# Patient Record
Sex: FEMALE | Race: BLACK OR AFRICAN AMERICAN | Employment: UNEMPLOYED | URBAN - METROPOLITAN AREA
[De-identification: names, ages, dates, MRNs, and addresses within clinical notes are randomized per-mention and may not be internally consistent; named-entity substitution may affect disease eponyms.]

---

## 2018-06-26 ENCOUNTER — HOSPITAL ENCOUNTER (OUTPATIENT)
Dept: NON INVASIVE DIAGNOSTICS | Age: 63
Discharge: HOME | End: 2018-06-26
Payer: COMMERCIAL

## 2018-06-26 LAB
LV EF: 50 %
LVEF MODALITY: NORMAL

## 2018-06-26 PROCEDURE — 93306 TTE W/DOPPLER COMPLETE: CPT

## 2019-07-08 ENCOUNTER — HOSPITAL ENCOUNTER (OUTPATIENT)
Age: 64
Discharge: HOME | End: 2019-07-10
Payer: COMMERCIAL

## 2019-07-08 ENCOUNTER — OFFICE VISIT (OUTPATIENT)
Dept: FAMILY MEDICINE CLINIC | Age: 64
End: 2019-07-08
Payer: COMMERCIAL

## 2019-07-08 VITALS
BODY MASS INDEX: 24.16 KG/M2 | OXYGEN SATURATION: 99 % | HEART RATE: 84 BPM | DIASTOLIC BLOOD PRESSURE: 70 MMHG | SYSTOLIC BLOOD PRESSURE: 162 MMHG | RESPIRATION RATE: 18 BRPM | HEIGHT: 65 IN | TEMPERATURE: 98.4 F | WEIGHT: 145 LBS

## 2019-07-08 DIAGNOSIS — I10 ESSENTIAL HYPERTENSION: Primary | ICD-10-CM

## 2019-07-08 DIAGNOSIS — E78.00 PURE HYPERCHOLESTEROLEMIA: ICD-10-CM

## 2019-07-08 DIAGNOSIS — Z23 NEED FOR PNEUMOCOCCAL VACCINATION: ICD-10-CM

## 2019-07-08 DIAGNOSIS — Z12.39 BREAST CANCER SCREENING: ICD-10-CM

## 2019-07-08 DIAGNOSIS — R01.1 HEART MURMUR: ICD-10-CM

## 2019-07-08 DIAGNOSIS — I10 ESSENTIAL HYPERTENSION: ICD-10-CM

## 2019-07-08 DIAGNOSIS — J45.20 MILD INTERMITTENT ASTHMA WITHOUT COMPLICATION: ICD-10-CM

## 2019-07-08 DIAGNOSIS — M1A.00X0 IDIOPATHIC CHRONIC GOUT WITHOUT TOPHUS, UNSPECIFIED SITE: ICD-10-CM

## 2019-07-08 DIAGNOSIS — Z72.0 TOBACCO ABUSE: ICD-10-CM

## 2019-07-08 PROCEDURE — 85025 COMPLETE CBC W/AUTO DIFF WBC: CPT

## 2019-07-08 PROCEDURE — G8420 CALC BMI NORM PARAMETERS: HCPCS | Performed by: PHYSICIAN ASSISTANT

## 2019-07-08 PROCEDURE — 80061 LIPID PANEL: CPT

## 2019-07-08 PROCEDURE — 90732 PPSV23 VACC 2 YRS+ SUBQ/IM: CPT | Performed by: PHYSICIAN ASSISTANT

## 2019-07-08 PROCEDURE — 4004F PT TOBACCO SCREEN RCVD TLK: CPT | Performed by: PHYSICIAN ASSISTANT

## 2019-07-08 PROCEDURE — 99204 OFFICE O/P NEW MOD 45 MIN: CPT | Performed by: PHYSICIAN ASSISTANT

## 2019-07-08 PROCEDURE — 84550 ASSAY OF BLOOD/URIC ACID: CPT

## 2019-07-08 PROCEDURE — 90471 IMMUNIZATION ADMIN: CPT | Performed by: PHYSICIAN ASSISTANT

## 2019-07-08 PROCEDURE — G8427 DOCREV CUR MEDS BY ELIG CLIN: HCPCS | Performed by: PHYSICIAN ASSISTANT

## 2019-07-08 PROCEDURE — 80053 COMPREHEN METABOLIC PANEL: CPT

## 2019-07-08 PROCEDURE — 3017F COLORECTAL CA SCREEN DOC REV: CPT | Performed by: PHYSICIAN ASSISTANT

## 2019-07-08 RX ORDER — LORATADINE 10 MG/1
10 TABLET ORAL DAILY
Qty: 30 TABLET | Refills: 5 | Status: SHIPPED
Start: 2019-07-08 | End: 2021-07-13

## 2019-07-08 RX ORDER — ATENOLOL 50 MG/1
TABLET ORAL
Qty: 30 TABLET | Refills: 2 | Status: SHIPPED | OUTPATIENT
Start: 2019-07-08 | End: 2019-09-12 | Stop reason: SDUPTHER

## 2019-07-08 RX ORDER — LISINOPRIL AND HYDROCHLOROTHIAZIDE 25; 20 MG/1; MG/1
1 TABLET ORAL DAILY
Qty: 30 TABLET | Refills: 5 | Status: SHIPPED | OUTPATIENT
Start: 2019-07-08 | End: 2020-01-03

## 2019-07-08 RX ORDER — MONTELUKAST SODIUM 10 MG/1
10 TABLET ORAL NIGHTLY
Qty: 30 TABLET | Refills: 5 | Status: SHIPPED | OUTPATIENT
Start: 2019-07-08 | End: 2020-01-03

## 2019-07-08 RX ORDER — MONTELUKAST SODIUM 10 MG/1
TABLET ORAL
Refills: 0 | COMMUNITY
Start: 2019-07-02 | End: 2019-07-08 | Stop reason: SDUPTHER

## 2019-07-08 RX ORDER — ALBUTEROL SULFATE 90 UG/1
AEROSOL, METERED RESPIRATORY (INHALATION)
COMMUNITY
Start: 2014-10-20 | End: 2019-07-08 | Stop reason: SDUPTHER

## 2019-07-08 RX ORDER — ATORVASTATIN CALCIUM 40 MG/1
40 TABLET, FILM COATED ORAL DAILY
Qty: 30 TABLET | Refills: 5 | Status: SHIPPED | OUTPATIENT
Start: 2019-07-08 | End: 2020-01-03

## 2019-07-08 RX ORDER — ATORVASTATIN CALCIUM 40 MG/1
TABLET, FILM COATED ORAL
Refills: 0 | COMMUNITY
Start: 2019-07-02 | End: 2019-07-08 | Stop reason: SDUPTHER

## 2019-07-08 RX ORDER — NAPROXEN 500 MG/1
TABLET ORAL
COMMUNITY
Start: 2017-11-27 | End: 2019-07-08

## 2019-07-08 RX ORDER — BUDESONIDE AND FORMOTEROL FUMARATE DIHYDRATE 160; 4.5 UG/1; UG/1
AEROSOL RESPIRATORY (INHALATION)
Refills: 0 | COMMUNITY
Start: 2019-07-02 | End: 2019-07-08

## 2019-07-08 RX ORDER — LORATADINE 10 MG/1
TABLET ORAL
Refills: 11 | COMMUNITY
Start: 2019-05-23 | End: 2019-07-08 | Stop reason: SDUPTHER

## 2019-07-08 RX ORDER — ATENOLOL 25 MG/1
TABLET ORAL
Refills: 0 | COMMUNITY
Start: 2019-07-02 | End: 2019-07-08 | Stop reason: SDUPTHER

## 2019-07-08 RX ORDER — ALLOPURINOL 100 MG/1
100 TABLET ORAL 2 TIMES DAILY
Qty: 60 TABLET | Refills: 2 | Status: SHIPPED | OUTPATIENT
Start: 2019-07-08 | End: 2019-10-09 | Stop reason: SDUPTHER

## 2019-07-08 RX ORDER — FLUTICASONE PROPIONATE 50 MCG
SPRAY, SUSPENSION (ML) NASAL
COMMUNITY
Start: 2017-12-18 | End: 2019-07-08

## 2019-07-08 RX ORDER — LISINOPRIL AND HYDROCHLOROTHIAZIDE 25; 20 MG/1; MG/1
TABLET ORAL
Refills: 0 | COMMUNITY
Start: 2019-07-02 | End: 2019-07-08 | Stop reason: SDUPTHER

## 2019-07-08 RX ORDER — UMECLIDINIUM 62.5 UG/1
AEROSOL, POWDER ORAL
Refills: 0 | COMMUNITY
Start: 2019-07-02 | End: 2019-07-08

## 2019-07-08 RX ORDER — IBUPROFEN 800 MG/1
TABLET ORAL
Refills: 0 | COMMUNITY
Start: 2019-04-07 | End: 2019-07-08

## 2019-07-08 RX ORDER — ALBUTEROL SULFATE 2.5 MG/3ML
2.5 SOLUTION RESPIRATORY (INHALATION) EVERY 4 HOURS PRN
Qty: 120 EACH | Refills: 5 | Status: SHIPPED | OUTPATIENT
Start: 2019-07-08 | End: 2020-01-03

## 2019-07-08 RX ORDER — ALBUTEROL SULFATE 90 UG/1
2 AEROSOL, METERED RESPIRATORY (INHALATION) EVERY 4 HOURS PRN
Qty: 1 INHALER | Refills: 5 | Status: SHIPPED
Start: 2019-07-08 | End: 2020-06-16 | Stop reason: SDUPTHER

## 2019-07-08 RX ORDER — ALLOPURINOL 100 MG/1
TABLET ORAL
Refills: 0 | COMMUNITY
Start: 2019-07-02 | End: 2019-07-08 | Stop reason: SDUPTHER

## 2019-07-08 RX ORDER — ALBUTEROL SULFATE 2.5 MG/3ML
SOLUTION RESPIRATORY (INHALATION)
COMMUNITY
Start: 2016-09-07 | End: 2019-07-08 | Stop reason: SDUPTHER

## 2019-07-08 ASSESSMENT — PATIENT HEALTH QUESTIONNAIRE - PHQ9
SUM OF ALL RESPONSES TO PHQ9 QUESTIONS 1 & 2: 0
SUM OF ALL RESPONSES TO PHQ QUESTIONS 1-9: 0
SUM OF ALL RESPONSES TO PHQ QUESTIONS 1-9: 0
1. LITTLE INTEREST OR PLEASURE IN DOING THINGS: 0
2. FEELING DOWN, DEPRESSED OR HOPELESS: 0

## 2019-07-08 NOTE — PROGRESS NOTES
[employer]  [address] [facility], [address] [place] [address]   Cheng Carlson 52  1955 7/8/19      HPI:  Patient presents for establishment with hx of HTN and asthma. She has been taking the Zestoretic and atenolol for some time, and often continues to have elevated bp readings. She admits to kidney abnormality as a child, but believes her kidney function is good. She is not diabetic. She can recall being told she had a heart murmur in the past and even believes she saw a cardiologist. She states \"he got mad at me and I never went back. \" she denies cp or sob. Her asthma has been stable. She only has sx when exerting herself in the heat, but has not had this recently. She denies night sx. She smokes about 2 ppd. She is considering using the nicotine transdermal to quit. She has had frequent flares of gout, specifically in her feet. She takes the allopurinol once per day.      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    TONSILLECTOMY      TUBAL LIGATION         Current Outpatient Medications   Medication Sig Dispense Refill    montelukast (SINGULAIR) 10 MG tablet Take 1 tablet by mouth nightly 30 tablet 5    loratadine (CLARITIN) 10 MG tablet Take 1 tablet by mouth daily 30 tablet 5    lisinopril-hydrochlorothiazide (PRINZIDE;ZESTORETIC) 20-25 MG per tablet Take 1 tablet by mouth daily 30 tablet 5    atorvastatin (LIPITOR) 40 MG tablet Take 1 tablet by mouth daily 30 tablet 5    atenolol (TENORMIN) 50 MG tablet TAKE 1 TABLET BY MOUTH ONCE DAILY 30 tablet 2    aspirin 81 MG tablet Take 1 tablet by mouth daily 30 tablet 5    allopurinol (ZYLOPRIM) 100 MG tablet Take 1 tablet by mouth 2 times daily 60 tablet 2    albuterol sulfate HFA (VENTOLIN HFA) 108 (90 Base) MCG/ACT inhaler Inhale 2 puffs into the lungs every 4 hours as needed for Wheezing 1 Inhaler 5 allopurinol as gout is uncontrolled. Will get echo for assessment if murmur, it sounds like this may have been worked up in the past. Will set up screening exams. She will do pap smear here. Tolerated ppsv 23. Discussed smoking cessation.      PHYLLSI Garcia

## 2019-07-09 LAB
ALBUMIN SERPL-MCNC: 4.2 G/DL (ref 3.5–5.2)
ALP BLD-CCNC: 71 U/L (ref 35–104)
ALT SERPL-CCNC: 14 U/L (ref 0–32)
ANION GAP SERPL CALCULATED.3IONS-SCNC: 14 MMOL/L (ref 7–16)
AST SERPL-CCNC: 19 U/L (ref 0–31)
BASOPHILS ABSOLUTE: 0.05 E9/L (ref 0–0.2)
BASOPHILS RELATIVE PERCENT: 0.6 % (ref 0–2)
BILIRUB SERPL-MCNC: 0.3 MG/DL (ref 0–1.2)
BUN BLDV-MCNC: 19 MG/DL (ref 8–23)
CALCIUM SERPL-MCNC: 10.1 MG/DL (ref 8.6–10.2)
CHLORIDE BLD-SCNC: 101 MMOL/L (ref 98–107)
CHOLESTEROL, TOTAL: 127 MG/DL (ref 0–199)
CO2: 24 MMOL/L (ref 22–29)
CREAT SERPL-MCNC: 1.2 MG/DL (ref 0.5–1)
EOSINOPHILS ABSOLUTE: 0.24 E9/L (ref 0.05–0.5)
EOSINOPHILS RELATIVE PERCENT: 2.9 % (ref 0–6)
GFR AFRICAN AMERICAN: 55
GFR NON-AFRICAN AMERICAN: 55 ML/MIN/1.73
GLUCOSE BLD-MCNC: 61 MG/DL (ref 74–99)
HCT VFR BLD CALC: 43.2 % (ref 34–48)
HDLC SERPL-MCNC: 30 MG/DL
HEMOGLOBIN: 13.9 G/DL (ref 11.5–15.5)
IMMATURE GRANULOCYTES #: 0.04 E9/L
IMMATURE GRANULOCYTES %: 0.5 % (ref 0–5)
LDL CHOLESTEROL CALCULATED: 75 MG/DL (ref 0–99)
LYMPHOCYTES ABSOLUTE: 3.28 E9/L (ref 1.5–4)
LYMPHOCYTES RELATIVE PERCENT: 39 % (ref 20–42)
MCH RBC QN AUTO: 32.6 PG (ref 26–35)
MCHC RBC AUTO-ENTMCNC: 32.2 % (ref 32–34.5)
MCV RBC AUTO: 101.2 FL (ref 80–99.9)
MONOCYTES ABSOLUTE: 0.77 E9/L (ref 0.1–0.95)
MONOCYTES RELATIVE PERCENT: 9.2 % (ref 2–12)
NEUTROPHILS ABSOLUTE: 4.02 E9/L (ref 1.8–7.3)
NEUTROPHILS RELATIVE PERCENT: 47.8 % (ref 43–80)
PDW BLD-RTO: 13.3 FL (ref 11.5–15)
PLATELET # BLD: 210 E9/L (ref 130–450)
PMV BLD AUTO: 12.5 FL (ref 7–12)
POTASSIUM SERPL-SCNC: 3.9 MMOL/L (ref 3.5–5)
RBC # BLD: 4.27 E12/L (ref 3.5–5.5)
SODIUM BLD-SCNC: 139 MMOL/L (ref 132–146)
TOTAL PROTEIN: 7.6 G/DL (ref 6.4–8.3)
TRIGL SERPL-MCNC: 110 MG/DL (ref 0–149)
URIC ACID, SERUM: 4.6 MG/DL (ref 2.4–5.7)
VLDLC SERPL CALC-MCNC: 22 MG/DL
WBC # BLD: 8.4 E9/L (ref 4.5–11.5)

## 2019-07-15 ENCOUNTER — TELEPHONE (OUTPATIENT)
Dept: FAMILY MEDICINE CLINIC | Age: 64
End: 2019-07-15

## 2019-08-09 ENCOUNTER — OFFICE VISIT (OUTPATIENT)
Dept: FAMILY MEDICINE CLINIC | Age: 64
End: 2019-08-09
Payer: COMMERCIAL

## 2019-08-09 ENCOUNTER — HOSPITAL ENCOUNTER (OUTPATIENT)
Age: 64
Discharge: HOME | End: 2019-08-11
Payer: COMMERCIAL

## 2019-08-09 VITALS
TEMPERATURE: 98.1 F | HEIGHT: 65 IN | HEART RATE: 78 BPM | BODY MASS INDEX: 23.82 KG/M2 | WEIGHT: 143 LBS | OXYGEN SATURATION: 95 % | DIASTOLIC BLOOD PRESSURE: 70 MMHG | RESPIRATION RATE: 16 BRPM | SYSTOLIC BLOOD PRESSURE: 132 MMHG

## 2019-08-09 DIAGNOSIS — I10 ESSENTIAL HYPERTENSION: Primary | ICD-10-CM

## 2019-08-09 DIAGNOSIS — R79.89 ELEVATED SERUM CREATININE: ICD-10-CM

## 2019-08-09 DIAGNOSIS — Z12.11 COLON CANCER SCREENING: ICD-10-CM

## 2019-08-09 LAB
ANION GAP SERPL CALCULATED.3IONS-SCNC: 12 MMOL/L (ref 7–16)
BUN BLDV-MCNC: 24 MG/DL (ref 8–23)
CALCIUM SERPL-MCNC: 9.4 MG/DL (ref 8.6–10.2)
CHLORIDE BLD-SCNC: 100 MMOL/L (ref 98–107)
CO2: 26 MMOL/L (ref 22–29)
CREAT SERPL-MCNC: 1.5 MG/DL (ref 0.5–1)
GFR AFRICAN AMERICAN: 43
GFR NON-AFRICAN AMERICAN: 43 ML/MIN/1.73
GLUCOSE BLD-MCNC: 100 MG/DL (ref 74–99)
POTASSIUM SERPL-SCNC: 4 MMOL/L (ref 3.5–5)
SODIUM BLD-SCNC: 138 MMOL/L (ref 132–146)

## 2019-08-09 PROCEDURE — G8427 DOCREV CUR MEDS BY ELIG CLIN: HCPCS | Performed by: PHYSICIAN ASSISTANT

## 2019-08-09 PROCEDURE — 4004F PT TOBACCO SCREEN RCVD TLK: CPT | Performed by: PHYSICIAN ASSISTANT

## 2019-08-09 PROCEDURE — 3017F COLORECTAL CA SCREEN DOC REV: CPT | Performed by: PHYSICIAN ASSISTANT

## 2019-08-09 PROCEDURE — G8420 CALC BMI NORM PARAMETERS: HCPCS | Performed by: PHYSICIAN ASSISTANT

## 2019-08-09 PROCEDURE — 80048 BASIC METABOLIC PNL TOTAL CA: CPT

## 2019-08-09 PROCEDURE — 99213 OFFICE O/P EST LOW 20 MIN: CPT | Performed by: PHYSICIAN ASSISTANT

## 2019-08-09 PROCEDURE — 36415 COLL VENOUS BLD VENIPUNCTURE: CPT | Performed by: PHYSICIAN ASSISTANT

## 2019-09-03 ENCOUNTER — TELEPHONE (OUTPATIENT)
Dept: FAMILY MEDICINE CLINIC | Age: 64
End: 2019-09-03

## 2019-09-03 NOTE — TELEPHONE ENCOUNTER
1st ATTEMPT FIT TEST - Patient states she will return fit when she gets transportation to the office.

## 2019-09-13 ENCOUNTER — TELEPHONE (OUTPATIENT)
Dept: FAMILY MEDICINE CLINIC | Age: 64
End: 2019-09-13

## 2019-10-15 ENCOUNTER — TELEPHONE (OUTPATIENT)
Dept: FAMILY MEDICINE CLINIC | Age: 64
End: 2019-10-15

## 2020-01-03 RX ORDER — LISINOPRIL AND HYDROCHLOROTHIAZIDE 25; 20 MG/1; MG/1
TABLET ORAL
Qty: 30 TABLET | Refills: 3 | Status: SHIPPED
Start: 2020-01-03 | End: 2020-06-30 | Stop reason: SDUPTHER

## 2020-01-03 RX ORDER — ATORVASTATIN CALCIUM 40 MG/1
TABLET, FILM COATED ORAL
Qty: 30 TABLET | Refills: 3 | Status: SHIPPED
Start: 2020-01-03 | End: 2020-06-30 | Stop reason: SDUPTHER

## 2020-01-03 RX ORDER — ALBUTEROL SULFATE 2.5 MG/3ML
SOLUTION RESPIRATORY (INHALATION)
Qty: 360 ML | Refills: 3 | Status: SHIPPED
Start: 2020-01-03 | End: 2020-05-20

## 2020-01-03 RX ORDER — MONTELUKAST SODIUM 10 MG/1
10 TABLET ORAL NIGHTLY
Qty: 30 TABLET | Refills: 3 | Status: SHIPPED
Start: 2020-01-03 | End: 2020-06-16 | Stop reason: SDUPTHER

## 2020-01-03 RX ORDER — ALLOPURINOL 100 MG/1
TABLET ORAL
Qty: 60 TABLET | Refills: 3 | Status: SHIPPED
Start: 2020-01-03 | End: 2020-06-30 | Stop reason: SDUPTHER

## 2020-03-13 ENCOUNTER — OFFICE VISIT (OUTPATIENT)
Dept: FAMILY MEDICINE CLINIC | Age: 65
End: 2020-03-13
Payer: COMMERCIAL

## 2020-03-13 ENCOUNTER — HOSPITAL ENCOUNTER (OUTPATIENT)
Age: 65
Discharge: HOME | End: 2020-03-15
Payer: COMMERCIAL

## 2020-03-13 VITALS
SYSTOLIC BLOOD PRESSURE: 156 MMHG | RESPIRATION RATE: 16 BRPM | HEART RATE: 86 BPM | DIASTOLIC BLOOD PRESSURE: 70 MMHG | HEIGHT: 65 IN | TEMPERATURE: 97.5 F | WEIGHT: 145.2 LBS | OXYGEN SATURATION: 99 % | BODY MASS INDEX: 24.19 KG/M2

## 2020-03-13 PROCEDURE — G8484 FLU IMMUNIZE NO ADMIN: HCPCS | Performed by: PHYSICIAN ASSISTANT

## 2020-03-13 PROCEDURE — 88175 CYTOPATH C/V AUTO FLUID REDO: CPT

## 2020-03-13 PROCEDURE — 99396 PREV VISIT EST AGE 40-64: CPT | Performed by: PHYSICIAN ASSISTANT

## 2020-03-13 RX ORDER — DEXTROMETHORPHAN HYDROBROMIDE AND PROMETHAZINE HYDROCHLORIDE 15; 6.25 MG/5ML; MG/5ML
5 SYRUP ORAL 4 TIMES DAILY PRN
Qty: 180 ML | Refills: 0 | Status: SHIPPED
Start: 2020-03-13 | End: 2020-03-13 | Stop reason: SDUPTHER

## 2020-03-13 RX ORDER — DEXTROMETHORPHAN HYDROBROMIDE AND PROMETHAZINE HYDROCHLORIDE 15; 6.25 MG/5ML; MG/5ML
5 SYRUP ORAL 4 TIMES DAILY PRN
Qty: 180 ML | Refills: 0 | Status: SHIPPED | OUTPATIENT
Start: 2020-03-13 | End: 2020-03-20

## 2020-03-13 ASSESSMENT — PATIENT HEALTH QUESTIONNAIRE - PHQ9
SUM OF ALL RESPONSES TO PHQ QUESTIONS 1-9: 0
SUM OF ALL RESPONSES TO PHQ QUESTIONS 1-9: 0
SUM OF ALL RESPONSES TO PHQ9 QUESTIONS 1 & 2: 0
SUM OF ALL RESPONSES TO PHQ QUESTIONS 1-9: 0
2. FEELING DOWN, DEPRESSED OR HOPELESS: 0
1. LITTLE INTEREST OR PLEASURE IN DOING THINGS: 0
1. LITTLE INTEREST OR PLEASURE IN DOING THINGS: 0
SUM OF ALL RESPONSES TO PHQ QUESTIONS 1-9: 0

## 2020-03-13 NOTE — PROGRESS NOTES
into the lungs every 4 hours as needed for Wheezing 1 Inhaler 5     No current facility-administered medications for this visit.         Allergies   Allergen Reactions    Pcn [Penicillins]        Family History   Problem Relation Age of Onset    Diabetes Mother     High Blood Pressure Mother     Heart Disease Father        Social History     Socioeconomic History    Marital status:      Spouse name: Not on file    Number of children: Not on file    Years of education: Not on file    Highest education level: Not on file   Occupational History    Not on file   Social Needs    Financial resource strain: Not on file    Food insecurity     Worry: Not on file     Inability: Not on file    Transportation needs     Medical: Not on file     Non-medical: Not on file   Tobacco Use    Smoking status: Current Every Day Smoker     Packs/day: 0.50     Years: 20.00     Pack years: 10.00     Types: Cigarettes     Start date: 7/8/1999    Smokeless tobacco: Never Used   Substance and Sexual Activity    Alcohol use: No    Drug use: No    Sexual activity: Yes     Partners: Male   Lifestyle    Physical activity     Days per week: Not on file     Minutes per session: Not on file    Stress: Not on file   Relationships    Social connections     Talks on phone: Not on file     Gets together: Not on file     Attends [employer] service: Not on file     Active member of club or organization: Not on file     Attends meetings of clubs or organizations: Not on file     Relationship status: Not on file    Intimate partner violence     Fear of current or ex partner: Not on file     Emotionally abused: Not on file     Physically abused: Not on file     Forced sexual activity: Not on file   Other Topics Concern    Not on file   Social History Narrative    Not on file       Review of Systems :  Constitutional: negative for - chills, fever, weight loss, or fatigue  Psychological: negative for - anxiety, depression or suicidal position: Supine. Labia:         Right: No rash. Left: No rash. Vagina: Normal.      Cervix: Normal.      Uterus: Normal.       Adnexa: Right adnexa normal and left adnexa normal.   Musculoskeletal: Normal range of motion. Lymphadenopathy:      Upper Body:      Right upper body: No supraclavicular or axillary adenopathy. Left upper body: No supraclavicular or axillary adenopathy. Skin:     General: Skin is warm and dry. Findings: No rash. Neurological:      Mental Status: She is alert and oriented to person, place, and time. Deep Tendon Reflexes: Reflexes are normal and symmetric. Psychiatric:         Speech: Speech normal.         Behavior: Behavior normal.          Assessment/Plan:     Gardiner Goldberg was seen today for gynecologic exam and cough. Diagnoses and all orders for this visit:    Cervical cancer screening  -     PAP SMEAR    Colon cancer screening  -     Cologuard (For External Results Only); Future    Breast cancer screening  -     EM DIGITAL SCREEN W CAD BILATERAL; Future    Cough  -     promethazine-dextromethorphan (PROMETHAZINE-DM) 6.25-15 MG/5ML syrup; Take 5 mLs by mouth 4 times daily as needed for Cough      Take as directed.        PHYLLIS Varghese

## 2020-03-16 ENCOUNTER — HOSPITAL ENCOUNTER (OUTPATIENT)
Dept: GENERAL RADIOLOGY | Age: 65
Discharge: HOME | End: 2020-03-18
Payer: COMMERCIAL

## 2020-03-16 PROCEDURE — 77063 BREAST TOMOSYNTHESIS BI: CPT

## 2020-03-23 ENCOUNTER — TELEPHONE (OUTPATIENT)
Dept: ONCOLOGY | Age: 65
End: 2020-03-23

## 2020-03-27 ENCOUNTER — TELEPHONE (OUTPATIENT)
Dept: ONCOLOGY | Age: 65
End: 2020-03-27

## 2020-03-27 NOTE — TELEPHONE ENCOUNTER
Spoke with patient regarding addendum made to mammography report. Patient's exam has now been compared to prior imaging studies from [facility]. Upon further review, there are no significant changes from the prior study. No further imaging is needed. Report faxed to ordering physician's office. Patient verbalized understanding.

## 2020-05-20 RX ORDER — ALBUTEROL SULFATE 2.5 MG/3ML
SOLUTION RESPIRATORY (INHALATION)
Qty: 360 ML | Refills: 10 | Status: SHIPPED
Start: 2020-05-20 | End: 2022-05-24

## 2020-06-12 ENCOUNTER — TELEPHONE (OUTPATIENT)
Dept: ADMINISTRATIVE | Age: 65
End: 2020-06-12

## 2020-06-12 NOTE — TELEPHONE ENCOUNTER
Pt calling in wants to schedule to come in office and get her allergy shot, states that it is too humid out, that she is experiencing allergy flair ups? Please advise how you would like this scheduled and I will schedule accordingly.  Thank you

## 2020-06-16 ENCOUNTER — TELEPHONE (OUTPATIENT)
Dept: ADMINISTRATIVE | Age: 65
End: 2020-06-16

## 2020-06-16 ENCOUNTER — VIRTUAL VISIT (OUTPATIENT)
Dept: FAMILY MEDICINE CLINIC | Age: 65
End: 2020-06-16
Payer: COMMERCIAL

## 2020-06-16 PROCEDURE — 99214 OFFICE O/P EST MOD 30 MIN: CPT | Performed by: PHYSICIAN ASSISTANT

## 2020-06-16 PROCEDURE — 3017F COLORECTAL CA SCREEN DOC REV: CPT | Performed by: PHYSICIAN ASSISTANT

## 2020-06-16 PROCEDURE — G8427 DOCREV CUR MEDS BY ELIG CLIN: HCPCS | Performed by: PHYSICIAN ASSISTANT

## 2020-06-16 RX ORDER — ALBUTEROL SULFATE 90 UG/1
2 AEROSOL, METERED RESPIRATORY (INHALATION) EVERY 4 HOURS PRN
Qty: 1 INHALER | Refills: 5 | Status: SHIPPED
Start: 2020-06-16 | End: 2021-01-04

## 2020-06-16 RX ORDER — MONTELUKAST SODIUM 10 MG/1
10 TABLET ORAL NIGHTLY
Qty: 30 TABLET | Refills: 3 | Status: SHIPPED
Start: 2020-06-16 | End: 2021-07-13 | Stop reason: SDUPTHER

## 2020-06-16 RX ORDER — BUDESONIDE AND FORMOTEROL FUMARATE DIHYDRATE 160; 4.5 UG/1; UG/1
2 AEROSOL RESPIRATORY (INHALATION) 2 TIMES DAILY
Qty: 1 INHALER | Refills: 5 | Status: SHIPPED
Start: 2020-06-16 | End: 2021-07-13 | Stop reason: SDUPTHER

## 2020-06-16 RX ORDER — FLUTICASONE PROPIONATE 50 MCG
2 SPRAY, SUSPENSION (ML) NASAL DAILY
Qty: 1 BOTTLE | Refills: 1 | Status: SHIPPED
Start: 2020-06-16 | End: 2021-01-12 | Stop reason: SDUPTHER

## 2020-06-16 NOTE — TELEPHONE ENCOUNTER
I called out to Gale Snider for COLON need, per call list. She told me that she called the office on Friday but hasn't gotten a return phone call - she was wanting to schedule for allergies. She is scheduled for a VV today.

## 2020-06-16 NOTE — PROGRESS NOTES
TeleMedicine Patient Consent    This visit was performed as a virtual video visit using a synchronous, two-way, audio-video telehealth technology platform. Patient identification was verified at the start of the visit, including the patient's telephone number and physical location. I discussed with the patient the nature of our telehealth visits, that:     1. Due to the nature of an audio- video modality, the only components of a physical exam that could be done are the elements supported by direct observation. 2. I would evaluate the patient and recommend diagnostics and treatments based on my assessment. 3. If it was felt that the patient should be evaluated in clinic or an emergency room setting, then they would be directed there. 4. Our sessions are not being recorded and that personal health information is protected. 5. Our team would provide follow up care in person if/when the patient needs it. Patient does agree to proceed with telemedicine consultation. Patient's location: [place]. Is there anyone else present for this visit: No  This visit was completed virtually using [employer]. me    Physician Location:    [facility] [address]    Time spent: Greater than Not billed by time    2020    TELEHEALTH EVALUATION -- Audio or Visual (During NJO-83 public health emergency)    HPI:    Esther Ramirez (:  1955) has requested an audio/video evaluation for the following concern(s): nasal congestion and uncontrolled asthma. She is out of her singulair. She is sneezing and congested. She is waking up at night with asthma sx. She is using her albuterol inhaler 3x per day. She stopped her spiriva bc she believes that it made her \"sicker. \" she no longer has a nebulizer, but asks for one. She denies fevers. She is taking bp meds as discussed. Her most recent labs were almost 1 year ago. We reviewed those and discussed updating.          ROS Unless otherwise DTaP/Tdap/Td vaccine (1 - Tdap) 02/19/1976    Shingles Vaccine (1 of 2) 02/19/2007    Colon cancer screen colonoscopy  02/19/2007    Lipid screen  07/08/2020    Potassium monitoring  08/09/2020    Creatinine monitoring  08/09/2020    Flu vaccine (Season Ended) 09/01/2020    Breast cancer screen  03/16/2022    Cervical cancer screen  03/13/2023    Pneumococcal 0-64 years Vaccine  Completed    Hepatitis A vaccine  Aged Out    Hepatitis B vaccine  Aged Out    Hib vaccine  Aged Out    Meningococcal (ACWY) vaccine  Aged Out       PHYSICAL EXAMINATION:  [ INSTRUCTIONS:  \"[x]\" Indicates a positive item  \"[]\" Indicates a negative item  -- DELETE ALL ITEMS NOT EXAMINED]  Vital Signs: (As obtained by patient/caregiver or practitioner observation)    Blood pressure-  Heart rate-    Respiratory rate-    Temperature-  Pulse oximetry-     Constitutional: [x] Appears well-developed and well-nourished [] No apparent distress      [] Abnormal-   Mental status  [x] Alert and awake  [x] Oriented to person/place/time [x]Able to follow commands      Eyes:  EOM    [x]  Normal  [] Abnormal-  Sclera  [x]  Normal  [] Abnormal -         Discharge [x]  None visible  [] Abnormal -    HENT:   [x] Normocephalic, atraumatic.   [] Abnormal   [x] Mouth/Throat: Mucous membranes are moist.     External Ears [x] Normal  [] Abnormal-     Neck: [x] No visualized mass     Pulmonary/Chest: [x] Respiratory effort normal.  [x] No visualized signs of difficulty breathing or respiratory distress        [] Abnormal-      Musculoskeletal:   [] Normal gait with no signs of ataxia         [x] Normal range of motion of neck        [] Abnormal-       Neurological:        [x] No Facial Asymmetry (Cranial nerve 7 motor function) (limited exam to video visit)          [] No gaze palsy        [] Abnormal-         Skin:        [x] No significant exanthematous lesions or discoloration noted on facial skin         [] Abnormal-            Psychiatric:       [x]

## 2020-06-30 ENCOUNTER — OFFICE VISIT (OUTPATIENT)
Dept: FAMILY MEDICINE CLINIC | Age: 65
End: 2020-06-30
Payer: COMMERCIAL

## 2020-06-30 ENCOUNTER — HOSPITAL ENCOUNTER (OUTPATIENT)
Age: 65
Discharge: HOME | End: 2020-07-02
Payer: COMMERCIAL

## 2020-06-30 VITALS
DIASTOLIC BLOOD PRESSURE: 62 MMHG | RESPIRATION RATE: 18 BRPM | SYSTOLIC BLOOD PRESSURE: 132 MMHG | OXYGEN SATURATION: 99 % | TEMPERATURE: 98 F | BODY MASS INDEX: 24.83 KG/M2 | HEIGHT: 65 IN | HEART RATE: 76 BPM | WEIGHT: 149 LBS

## 2020-06-30 LAB
ANION GAP SERPL CALCULATED.3IONS-SCNC: 10 MMOL/L (ref 7–16)
BASOPHILS ABSOLUTE: 0.03 E9/L (ref 0–0.2)
BASOPHILS RELATIVE PERCENT: 0.3 % (ref 0–2)
BUN BLDV-MCNC: 23 MG/DL (ref 8–23)
CALCIUM SERPL-MCNC: 9.1 MG/DL (ref 8.6–10.2)
CHLORIDE BLD-SCNC: 105 MMOL/L (ref 98–107)
CHOLESTEROL, TOTAL: 116 MG/DL (ref 0–199)
CO2: 24 MMOL/L (ref 22–29)
CREAT SERPL-MCNC: 1.3 MG/DL (ref 0.5–1)
EOSINOPHILS ABSOLUTE: 0.33 E9/L (ref 0.05–0.5)
EOSINOPHILS RELATIVE PERCENT: 3.7 % (ref 0–6)
GFR AFRICAN AMERICAN: 50
GFR NON-AFRICAN AMERICAN: 50 ML/MIN/1.73
GLUCOSE BLD-MCNC: 93 MG/DL (ref 74–99)
HCT VFR BLD CALC: 43 % (ref 34–48)
HDLC SERPL-MCNC: 37 MG/DL
HEMOGLOBIN: 13.3 G/DL (ref 11.5–15.5)
IMMATURE GRANULOCYTES #: 0.02 E9/L
IMMATURE GRANULOCYTES %: 0.2 % (ref 0–5)
LDL CHOLESTEROL CALCULATED: 61 MG/DL (ref 0–99)
LYMPHOCYTES ABSOLUTE: 3.06 E9/L (ref 1.5–4)
LYMPHOCYTES RELATIVE PERCENT: 34.5 % (ref 20–42)
MCH RBC QN AUTO: 32 PG (ref 26–35)
MCHC RBC AUTO-ENTMCNC: 30.9 % (ref 32–34.5)
MCV RBC AUTO: 103.4 FL (ref 80–99.9)
MONOCYTES ABSOLUTE: 0.73 E9/L (ref 0.1–0.95)
MONOCYTES RELATIVE PERCENT: 8.2 % (ref 2–12)
NEUTROPHILS ABSOLUTE: 4.71 E9/L (ref 1.8–7.3)
NEUTROPHILS RELATIVE PERCENT: 53.1 % (ref 43–80)
PDW BLD-RTO: 13.5 FL (ref 11.5–15)
PLATELET # BLD: 226 E9/L (ref 130–450)
PMV BLD AUTO: 11.7 FL (ref 7–12)
POTASSIUM SERPL-SCNC: 4.1 MMOL/L (ref 3.5–5)
RBC # BLD: 4.16 E12/L (ref 3.5–5.5)
SODIUM BLD-SCNC: 139 MMOL/L (ref 132–146)
TRIGL SERPL-MCNC: 92 MG/DL (ref 0–149)
VLDLC SERPL CALC-MCNC: 18 MG/DL
WBC # BLD: 8.9 E9/L (ref 4.5–11.5)

## 2020-06-30 PROCEDURE — 85025 COMPLETE CBC W/AUTO DIFF WBC: CPT

## 2020-06-30 PROCEDURE — 99214 OFFICE O/P EST MOD 30 MIN: CPT | Performed by: PHYSICIAN ASSISTANT

## 2020-06-30 PROCEDURE — G8420 CALC BMI NORM PARAMETERS: HCPCS | Performed by: PHYSICIAN ASSISTANT

## 2020-06-30 PROCEDURE — G8428 CUR MEDS NOT DOCUMENT: HCPCS | Performed by: PHYSICIAN ASSISTANT

## 2020-06-30 PROCEDURE — 4004F PT TOBACCO SCREEN RCVD TLK: CPT | Performed by: PHYSICIAN ASSISTANT

## 2020-06-30 PROCEDURE — 80048 BASIC METABOLIC PNL TOTAL CA: CPT

## 2020-06-30 PROCEDURE — 80061 LIPID PANEL: CPT

## 2020-06-30 PROCEDURE — 3017F COLORECTAL CA SCREEN DOC REV: CPT | Performed by: PHYSICIAN ASSISTANT

## 2020-06-30 RX ORDER — ATENOLOL 50 MG/1
TABLET ORAL
Qty: 30 TABLET | Refills: 5 | Status: SHIPPED
Start: 2020-06-30 | End: 2021-01-04

## 2020-06-30 RX ORDER — LISINOPRIL AND HYDROCHLOROTHIAZIDE 25; 20 MG/1; MG/1
1 TABLET ORAL DAILY
Qty: 30 TABLET | Refills: 5 | Status: SHIPPED
Start: 2020-06-30 | End: 2021-01-04

## 2020-06-30 RX ORDER — ATORVASTATIN CALCIUM 40 MG/1
40 TABLET, FILM COATED ORAL DAILY
Qty: 30 TABLET | Refills: 5 | Status: SHIPPED
Start: 2020-06-30 | End: 2021-01-04

## 2020-06-30 RX ORDER — KETOTIFEN FUMARATE 0.35 MG/ML
1 SOLUTION/ DROPS OPHTHALMIC 2 TIMES DAILY
Qty: 1 ML | Refills: 1 | Status: SHIPPED
Start: 2020-06-30 | End: 2021-07-13 | Stop reason: SDUPTHER

## 2020-06-30 RX ORDER — ALLOPURINOL 100 MG/1
TABLET ORAL
Qty: 60 TABLET | Refills: 5 | Status: SHIPPED
Start: 2020-06-30 | End: 2021-01-04

## 2020-09-17 ENCOUNTER — TELEPHONE (OUTPATIENT)
Dept: FAMILY MEDICINE CLINIC | Age: 65
End: 2020-09-17

## 2020-09-17 NOTE — TELEPHONE ENCOUNTER
Patient states that her kids threw away her nebulizer machine and needs a new one. I told her that insurance may not cover it because she received one this year but I will try. May I have an order please?  Thanks

## 2020-12-31 DIAGNOSIS — M1A.00X0 IDIOPATHIC CHRONIC GOUT WITHOUT TOPHUS, UNSPECIFIED SITE: ICD-10-CM

## 2020-12-31 DIAGNOSIS — J45.20 MILD INTERMITTENT ASTHMA WITHOUT COMPLICATION: ICD-10-CM

## 2020-12-31 DIAGNOSIS — E78.00 PURE HYPERCHOLESTEROLEMIA: ICD-10-CM

## 2020-12-31 DIAGNOSIS — I10 ESSENTIAL HYPERTENSION: ICD-10-CM

## 2021-01-04 RX ORDER — ALLOPURINOL 100 MG/1
TABLET ORAL
Qty: 60 TABLET | Refills: 5 | Status: SHIPPED
Start: 2021-01-04 | End: 2021-07-06 | Stop reason: SDUPTHER

## 2021-01-04 RX ORDER — ATENOLOL 50 MG/1
TABLET ORAL
Qty: 30 TABLET | Refills: 10 | Status: SHIPPED
Start: 2021-01-04 | End: 2021-09-07 | Stop reason: SDUPTHER

## 2021-01-04 RX ORDER — ATORVASTATIN CALCIUM 40 MG/1
TABLET, FILM COATED ORAL
Qty: 30 TABLET | Refills: 10 | Status: SHIPPED
Start: 2021-01-04 | End: 2021-09-07 | Stop reason: SDUPTHER

## 2021-01-04 RX ORDER — LISINOPRIL AND HYDROCHLOROTHIAZIDE 25; 20 MG/1; MG/1
TABLET ORAL
Qty: 30 TABLET | Refills: 10 | Status: SHIPPED
Start: 2021-01-04 | End: 2021-08-23 | Stop reason: ALTCHOICE

## 2021-01-04 RX ORDER — ALBUTEROL SULFATE 90 UG/1
AEROSOL, METERED RESPIRATORY (INHALATION)
Qty: 18 G | Refills: 10 | Status: SHIPPED
Start: 2021-01-04 | End: 2021-07-13 | Stop reason: SDUPTHER

## 2021-01-12 ENCOUNTER — OFFICE VISIT (OUTPATIENT)
Dept: FAMILY MEDICINE CLINIC | Age: 66
End: 2021-01-12
Payer: COMMERCIAL

## 2021-01-12 VITALS
HEIGHT: 65 IN | RESPIRATION RATE: 16 BRPM | HEART RATE: 84 BPM | SYSTOLIC BLOOD PRESSURE: 190 MMHG | WEIGHT: 154 LBS | OXYGEN SATURATION: 97 % | DIASTOLIC BLOOD PRESSURE: 64 MMHG | TEMPERATURE: 97.2 F | BODY MASS INDEX: 25.66 KG/M2

## 2021-01-12 DIAGNOSIS — I10 ESSENTIAL HYPERTENSION: ICD-10-CM

## 2021-01-12 DIAGNOSIS — Z72.0 TOBACCO ABUSE: ICD-10-CM

## 2021-01-12 DIAGNOSIS — J30.2 SEASONAL ALLERGIES: ICD-10-CM

## 2021-01-12 DIAGNOSIS — E78.00 PURE HYPERCHOLESTEROLEMIA: ICD-10-CM

## 2021-01-12 DIAGNOSIS — J45.41 MODERATE PERSISTENT ASTHMA WITH ACUTE EXACERBATION: ICD-10-CM

## 2021-01-12 DIAGNOSIS — I10 ESSENTIAL HYPERTENSION: Primary | ICD-10-CM

## 2021-01-12 DIAGNOSIS — R09.81 NASAL CONGESTION: ICD-10-CM

## 2021-01-12 PROCEDURE — 3017F COLORECTAL CA SCREEN DOC REV: CPT | Performed by: PHYSICIAN ASSISTANT

## 2021-01-12 PROCEDURE — G8484 FLU IMMUNIZE NO ADMIN: HCPCS | Performed by: PHYSICIAN ASSISTANT

## 2021-01-12 PROCEDURE — 4004F PT TOBACCO SCREEN RCVD TLK: CPT | Performed by: PHYSICIAN ASSISTANT

## 2021-01-12 PROCEDURE — 99214 OFFICE O/P EST MOD 30 MIN: CPT | Performed by: PHYSICIAN ASSISTANT

## 2021-01-12 PROCEDURE — G8419 CALC BMI OUT NRM PARAM NOF/U: HCPCS | Performed by: PHYSICIAN ASSISTANT

## 2021-01-12 PROCEDURE — G8427 DOCREV CUR MEDS BY ELIG CLIN: HCPCS | Performed by: PHYSICIAN ASSISTANT

## 2021-01-12 RX ORDER — FLUTICASONE PROPIONATE 50 MCG
2 SPRAY, SUSPENSION (ML) NASAL DAILY
Qty: 1 BOTTLE | Refills: 1 | Status: SHIPPED
Start: 2021-01-12 | End: 2021-03-31 | Stop reason: SDUPTHER

## 2021-01-12 RX ORDER — PREDNISONE 20 MG/1
20 TABLET ORAL 2 TIMES DAILY
Qty: 10 TABLET | Refills: 0 | Status: SHIPPED | OUTPATIENT
Start: 2021-01-12 | End: 2021-01-17

## 2021-01-12 RX ORDER — CLONIDINE HYDROCHLORIDE 0.1 MG/1
0.1 TABLET ORAL ONCE
Status: COMPLETED | OUTPATIENT
Start: 2021-01-12 | End: 2021-01-12

## 2021-01-12 RX ORDER — AZITHROMYCIN 250 MG/1
250 TABLET, FILM COATED ORAL SEE ADMIN INSTRUCTIONS
Qty: 6 TABLET | Refills: 0 | Status: SHIPPED | OUTPATIENT
Start: 2021-01-12 | End: 2021-01-17

## 2021-01-12 RX ADMIN — CLONIDINE HYDROCHLORIDE 0.1 MG: 0.1 TABLET ORAL at 13:45

## 2021-01-12 SDOH — ECONOMIC STABILITY: TRANSPORTATION INSECURITY
IN THE PAST 12 MONTHS, HAS LACK OF TRANSPORTATION KEPT YOU FROM MEETINGS, WORK, OR FROM GETTING THINGS NEEDED FOR DAILY LIVING?: NOT ASKED

## 2021-01-12 SDOH — ECONOMIC STABILITY: INCOME INSECURITY: HOW HARD IS IT FOR YOU TO PAY FOR THE VERY BASICS LIKE FOOD, HOUSING, MEDICAL CARE, AND HEATING?: NOT HARD AT ALL

## 2021-01-12 ASSESSMENT — PATIENT HEALTH QUESTIONNAIRE - PHQ9
2. FEELING DOWN, DEPRESSED OR HOPELESS: 0
SUM OF ALL RESPONSES TO PHQ QUESTIONS 1-9: 0
SUM OF ALL RESPONSES TO PHQ9 QUESTIONS 1 & 2: 0
SUM OF ALL RESPONSES TO PHQ QUESTIONS 1-9: 0

## 2021-01-12 NOTE — PROGRESS NOTES
above. Call or go to ED immediately if symptoms worsen or persist.  Return in about 1 week (around 1/19/2021). , or sooner if necessary. Educational materials and/or home exercises printed for patient's review and were included in patient instructions on his/her After Visit Summary and given to patient at the end of visit. Counseled regarding above diagnosis, including possible risks and complications,  especially if left uncontrolled. Counseled regarding the possible side effects, risks, benefits and alternatives to treatment; patient and/or guardian verbalizes understanding, agrees, feels comfortable with and wishes to proceed with above treatment plan. Advised patient to call with any new medication issues, and read all Rx info from pharmacy to assure aware of all possible risks and side effects of medication before taking. Reviewed age and gender appropriate health screening exams and vaccinations. Advised patient regarding importance of keeping up with recommended health maintenance and to schedule as soon as possible if overdue, as this is important in assessing for undiagnosed pathology, especially cancer, as well as protecting against potentially harmful/life threatening disease. Patient and/or guardian verbalizes understanding and agrees with above counseling, assessment and plan. All questions answered. Kurt Macias PA-C  1/12/2021    I have personally reviewed and updated the chief complaint, HPI, Past Medical, Family and Social History, as well as the above Review of Systems.

## 2021-01-13 ENCOUNTER — NURSE ONLY (OUTPATIENT)
Dept: FAMILY MEDICINE CLINIC | Age: 66
End: 2021-01-13

## 2021-01-13 VITALS — DIASTOLIC BLOOD PRESSURE: 66 MMHG | SYSTOLIC BLOOD PRESSURE: 138 MMHG

## 2021-01-13 LAB
ALBUMIN SERPL-MCNC: 3.9 G/DL (ref 3.5–5.2)
ALP BLD-CCNC: 70 U/L (ref 35–104)
ALT SERPL-CCNC: 20 U/L (ref 0–32)
ANION GAP SERPL CALCULATED.3IONS-SCNC: 13 MMOL/L (ref 7–16)
AST SERPL-CCNC: 15 U/L (ref 0–31)
BASOPHILS ABSOLUTE: 0.04 E9/L (ref 0–0.2)
BASOPHILS RELATIVE PERCENT: 0.5 % (ref 0–2)
BILIRUB SERPL-MCNC: 0.3 MG/DL (ref 0–1.2)
BUN BLDV-MCNC: 19 MG/DL (ref 8–23)
CALCIUM SERPL-MCNC: 9.6 MG/DL (ref 8.6–10.2)
CHLORIDE BLD-SCNC: 105 MMOL/L (ref 98–107)
CHOLESTEROL, TOTAL: 118 MG/DL (ref 0–199)
CO2: 24 MMOL/L (ref 22–29)
CREAT SERPL-MCNC: 1.2 MG/DL (ref 0.5–1)
EOSINOPHILS ABSOLUTE: 0.23 E9/L (ref 0.05–0.5)
EOSINOPHILS RELATIVE PERCENT: 2.8 % (ref 0–6)
GFR AFRICAN AMERICAN: 55
GFR NON-AFRICAN AMERICAN: 55 ML/MIN/1.73
GLUCOSE BLD-MCNC: 85 MG/DL (ref 74–99)
HCT VFR BLD CALC: 39.9 % (ref 34–48)
HDLC SERPL-MCNC: 36 MG/DL
HEMOGLOBIN: 12.6 G/DL (ref 11.5–15.5)
IMMATURE GRANULOCYTES #: 0.02 E9/L
IMMATURE GRANULOCYTES %: 0.2 % (ref 0–5)
LDL CHOLESTEROL CALCULATED: 60 MG/DL (ref 0–99)
LYMPHOCYTES ABSOLUTE: 2.8 E9/L (ref 1.5–4)
LYMPHOCYTES RELATIVE PERCENT: 33.5 % (ref 20–42)
MCH RBC QN AUTO: 32.1 PG (ref 26–35)
MCHC RBC AUTO-ENTMCNC: 31.6 % (ref 32–34.5)
MCV RBC AUTO: 101.5 FL (ref 80–99.9)
MONOCYTES ABSOLUTE: 0.83 E9/L (ref 0.1–0.95)
MONOCYTES RELATIVE PERCENT: 9.9 % (ref 2–12)
NEUTROPHILS ABSOLUTE: 4.43 E9/L (ref 1.8–7.3)
NEUTROPHILS RELATIVE PERCENT: 53.1 % (ref 43–80)
PDW BLD-RTO: 13.6 FL (ref 11.5–15)
PLATELET # BLD: 238 E9/L (ref 130–450)
PMV BLD AUTO: 11.6 FL (ref 7–12)
POTASSIUM SERPL-SCNC: 4 MMOL/L (ref 3.5–5)
RBC # BLD: 3.93 E12/L (ref 3.5–5.5)
SODIUM BLD-SCNC: 142 MMOL/L (ref 132–146)
TOTAL PROTEIN: 7 G/DL (ref 6.4–8.3)
TRIGL SERPL-MCNC: 108 MG/DL (ref 0–149)
VLDLC SERPL CALC-MCNC: 22 MG/DL
WBC # BLD: 8.4 E9/L (ref 4.5–11.5)

## 2021-02-01 ENCOUNTER — OFFICE VISIT (OUTPATIENT)
Dept: FAMILY MEDICINE CLINIC | Age: 66
End: 2021-02-01
Payer: COMMERCIAL

## 2021-02-01 VITALS
HEIGHT: 65 IN | DIASTOLIC BLOOD PRESSURE: 62 MMHG | HEART RATE: 61 BPM | TEMPERATURE: 97.4 F | RESPIRATION RATE: 18 BRPM | WEIGHT: 152 LBS | SYSTOLIC BLOOD PRESSURE: 130 MMHG | BODY MASS INDEX: 25.33 KG/M2 | OXYGEN SATURATION: 99 %

## 2021-02-01 DIAGNOSIS — R30.0 DYSURIA: ICD-10-CM

## 2021-02-01 DIAGNOSIS — L02.91 ABSCESS: ICD-10-CM

## 2021-02-01 DIAGNOSIS — B37.31 YEAST VAGINITIS: ICD-10-CM

## 2021-02-01 DIAGNOSIS — Z12.11 COLON CANCER SCREENING: Primary | ICD-10-CM

## 2021-02-01 LAB
BILIRUBIN, POC: NORMAL
BLOOD URINE, POC: NORMAL
CLARITY, POC: CLEAR
COLOR, POC: YELLOW
GLUCOSE URINE, POC: NORMAL
KETONES, POC: NORMAL
LEUKOCYTE EST, POC: NORMAL
NITRITE, POC: NORMAL
PH, POC: 7
PROTEIN, POC: 30
SPECIFIC GRAVITY, POC: 1.02
UROBILINOGEN, POC: 0.2

## 2021-02-01 PROCEDURE — G8419 CALC BMI OUT NRM PARAM NOF/U: HCPCS | Performed by: PHYSICIAN ASSISTANT

## 2021-02-01 PROCEDURE — 81002 URINALYSIS NONAUTO W/O SCOPE: CPT | Performed by: PHYSICIAN ASSISTANT

## 2021-02-01 PROCEDURE — G8427 DOCREV CUR MEDS BY ELIG CLIN: HCPCS | Performed by: PHYSICIAN ASSISTANT

## 2021-02-01 PROCEDURE — 3017F COLORECTAL CA SCREEN DOC REV: CPT | Performed by: PHYSICIAN ASSISTANT

## 2021-02-01 PROCEDURE — 4004F PT TOBACCO SCREEN RCVD TLK: CPT | Performed by: PHYSICIAN ASSISTANT

## 2021-02-01 PROCEDURE — 99214 OFFICE O/P EST MOD 30 MIN: CPT | Performed by: PHYSICIAN ASSISTANT

## 2021-02-01 PROCEDURE — G8484 FLU IMMUNIZE NO ADMIN: HCPCS | Performed by: PHYSICIAN ASSISTANT

## 2021-02-01 RX ORDER — MUPIROCIN CALCIUM 20 MG/G
CREAM TOPICAL
Qty: 15 G | Refills: 0 | Status: SHIPPED | OUTPATIENT
Start: 2021-02-01 | End: 2021-03-03

## 2021-02-01 RX ORDER — FLUCONAZOLE 150 MG/1
150 TABLET ORAL ONCE
Qty: 2 TABLET | Refills: 1 | Status: SHIPPED | OUTPATIENT
Start: 2021-02-01 | End: 2021-02-01

## 2021-02-01 RX ORDER — SULFAMETHOXAZOLE AND TRIMETHOPRIM 800; 160 MG/1; MG/1
1 TABLET ORAL 2 TIMES DAILY
Qty: 20 TABLET | Refills: 0 | Status: SHIPPED | OUTPATIENT
Start: 2021-02-01 | End: 2021-02-11

## 2021-02-01 ASSESSMENT — PATIENT HEALTH QUESTIONNAIRE - PHQ9
SUM OF ALL RESPONSES TO PHQ QUESTIONS 1-9: 0
1. LITTLE INTEREST OR PLEASURE IN DOING THINGS: 0

## 2021-02-01 NOTE — PROGRESS NOTES
Dysuria:  Patient is here today with complaints dysuria. She has vaginal burning. This started as pruritis after her zpak. This has been going on for 1 week(s). No polyuria, urgency, hesitancy or small voids. No hematuria. No rash. No fever. Associated signs and symptoms include \"a boil. \" this is located on her buttock. Alleviating factors include nothing. This has not happen to patient in the past.  Patient does not have genital complaints. Patient's past medical, surgical, social and/or family history reviewed, updated in chart, and are non-contributory (unless otherwise stated). Medications and allergies also reviewed and updated in chart. Review of Systems:  Constitutional:  No fever, no fatigue, no chills, no headaches, no weight change  Dermatology:  No rash, no mole, no dry or sensitive skin  ENT:  No cough, no sore throat, no sinus pain, no runny nose, no ear pain  Cardiology:  No chest pain, no palpitations, no leg edema, no shortness of breath, no PND  Gastroenterology:  No dysphagia, no abdominal pain, no nausea, no vomiting, no constipation, no diarrhea, no heartburn  Musculoskeletal:  No joint pain, no leg cramps, no back pain, no muscle aches  Respiratory:  No shortness of breath, no orthopnea, no wheezing, no ALMEIDA, no hemoptysis  Urology:  No blood in the urine, no urinary frequency, no urinary incontinence, no urinary urgency, no nocturia, + dysuria      Vitals:    02/01/21 1116   BP: 130/62   Site: Right Upper Arm   Position: Sitting   Cuff Size: Large Adult   Pulse: 61   Resp: 18   Temp: 97.4 °F (36.3 °C)   TempSrc: Infrared   SpO2: 99%   Weight: 152 lb (68.9 kg)   Height: 5' 5\" (1.651 m)       Physical Exam  Constitutional:       General: She is not in acute distress. Appearance: She is well-developed. HENT:      Head: Normocephalic and atraumatic.       Right Ear: External ear normal.      Left Ear: External ear normal.      Nose: Nose normal.   Eyes:      General: No scleral icterus. Conjunctiva/sclera: Conjunctivae normal.      Pupils: Pupils are equal, round, and reactive to light. Neck:      Musculoskeletal: Normal range of motion and neck supple. Thyroid: No thyromegaly. Cardiovascular:      Rate and Rhythm: Normal rate and regular rhythm. Heart sounds: Normal heart sounds. No murmur. Pulmonary:      Effort: Pulmonary effort is normal. No accessory muscle usage or respiratory distress. Breath sounds: Normal breath sounds. No wheezing. Musculoskeletal: Normal range of motion. Skin:     General: Skin is warm and dry. Findings: Erythema (mild, with induration on left buttock, no purulent drainage) present. No rash. Neurological:      Mental Status: She is alert and oriented to person, place, and time. Deep Tendon Reflexes: Reflexes are normal and symmetric. Psychiatric:         Speech: Speech normal.         Behavior: Behavior normal.         Assessment/Plan:      Michael Duvall was seen today for urinary tract infection and hypertension. Diagnoses and all orders for this visit:    Colon cancer screening  -     POCT Fit Test; Future    Dysuria  -     POCT Urinalysis no Micro    Yeast vaginitis  -     fluconazole (DIFLUCAN) 150 MG tablet; Take 1 tablet by mouth once for 1 dose May repeat in 3-5 days, if symptoms persist or recur. Abscess  -     sulfamethoxazole-trimethoprim (BACTRIM DS) 800-160 MG per tablet; Take 1 tablet by mouth 2 times daily for 10 days  -     mupirocin (BACTROBAN) 2 % cream; Apply 3 times daily. keep apt on feb 8. Sits baths or warm compresses. Unable to I&D today. As above. Call or go to ED immediately if symptoms worsen or persist.  Return in about 1 week (around 2/8/2021). , or sooner if necessary. Educational materials and/or home exercises printed for patient's review and were included in patient instructions on his/her After Visit Summary and given to patient at the end of visit.       Counseled regarding above diagnosis, including possible risks and complications,  especially if left uncontrolled. Counseled regarding the possible side effects, risks, benefits and alternatives to treatment; patient and/or guardian verbalizes understanding, agrees, feels comfortable with and wishes to proceed with above treatment plan. Advised patient to call with any new medication issues, and read all Rx info from pharmacy to assure aware of all possible risks and side effects of medication before taking. Reviewed age and gender appropriate health screening exams and vaccinations. Advised patient regarding importance of keeping up with recommended health maintenance and to schedule as soon as possible if overdue, as this is important in assessing for undiagnosed pathology, especially cancer, as well as protecting against potentially harmful/life threatening disease. Patient and/or guardian verbalizes understanding and agrees with above counseling, assessment and plan. All questions answered. Astrid Perry PA-C  2/1/2021    I have personally reviewed and updated the chief complaint, HPI, Past Medical, Family and Social History, as well as the above Review of Systems.

## 2021-03-31 DIAGNOSIS — R09.81 NASAL CONGESTION: ICD-10-CM

## 2021-03-31 DIAGNOSIS — J30.2 SEASONAL ALLERGIES: ICD-10-CM

## 2021-03-31 RX ORDER — FLUTICASONE PROPIONATE 50 MCG
2 SPRAY, SUSPENSION (ML) NASAL DAILY
Qty: 1 BOTTLE | Refills: 1 | Status: SHIPPED
Start: 2021-03-31 | End: 2021-04-02 | Stop reason: SDUPTHER

## 2021-03-31 RX ORDER — ASPIRIN 81 MG/1
TABLET, COATED ORAL
COMMUNITY
Start: 2021-03-01 | End: 2021-03-31 | Stop reason: SDUPTHER

## 2021-03-31 RX ORDER — ASPIRIN 81 MG/1
81 TABLET, COATED ORAL DAILY
Qty: 30 TABLET | Refills: 5 | Status: SHIPPED
Start: 2021-03-31 | End: 2021-09-07 | Stop reason: SDUPTHER

## 2021-04-01 DIAGNOSIS — R09.81 NASAL CONGESTION: ICD-10-CM

## 2021-04-01 DIAGNOSIS — J30.2 SEASONAL ALLERGIES: ICD-10-CM

## 2021-04-02 RX ORDER — FLUTICASONE PROPIONATE 50 MCG
2 SPRAY, SUSPENSION (ML) NASAL DAILY
Qty: 1 BOTTLE | Refills: 1 | Status: SHIPPED
Start: 2021-04-02 | End: 2021-07-13 | Stop reason: SDUPTHER

## 2021-07-06 DIAGNOSIS — M1A.00X0 IDIOPATHIC CHRONIC GOUT WITHOUT TOPHUS, UNSPECIFIED SITE: ICD-10-CM

## 2021-07-06 RX ORDER — ALLOPURINOL 100 MG/1
TABLET ORAL
Qty: 60 TABLET | Refills: 5 | Status: SHIPPED
Start: 2021-07-06 | End: 2021-08-02 | Stop reason: SDUPTHER

## 2021-07-13 ENCOUNTER — OFFICE VISIT (OUTPATIENT)
Dept: FAMILY MEDICINE CLINIC | Age: 66
End: 2021-07-13
Payer: COMMERCIAL

## 2021-07-13 VITALS
SYSTOLIC BLOOD PRESSURE: 132 MMHG | DIASTOLIC BLOOD PRESSURE: 64 MMHG | WEIGHT: 151 LBS | RESPIRATION RATE: 16 BRPM | HEART RATE: 64 BPM | TEMPERATURE: 97.4 F | HEIGHT: 65 IN | BODY MASS INDEX: 25.16 KG/M2

## 2021-07-13 DIAGNOSIS — R68.89 ITCHY EYES: ICD-10-CM

## 2021-07-13 DIAGNOSIS — Z72.0 TOBACCO ABUSE: ICD-10-CM

## 2021-07-13 DIAGNOSIS — L30.9 DERMATITIS: ICD-10-CM

## 2021-07-13 DIAGNOSIS — I10 ESSENTIAL HYPERTENSION: ICD-10-CM

## 2021-07-13 DIAGNOSIS — J45.20 MILD INTERMITTENT ASTHMA WITHOUT COMPLICATION: Primary | ICD-10-CM

## 2021-07-13 DIAGNOSIS — R09.81 NASAL CONGESTION: ICD-10-CM

## 2021-07-13 DIAGNOSIS — Z12.11 COLON CANCER SCREENING: ICD-10-CM

## 2021-07-13 DIAGNOSIS — J30.2 SEASONAL ALLERGIES: ICD-10-CM

## 2021-07-13 PROCEDURE — 3017F COLORECTAL CA SCREEN DOC REV: CPT | Performed by: PHYSICIAN ASSISTANT

## 2021-07-13 PROCEDURE — 4004F PT TOBACCO SCREEN RCVD TLK: CPT | Performed by: PHYSICIAN ASSISTANT

## 2021-07-13 PROCEDURE — G8419 CALC BMI OUT NRM PARAM NOF/U: HCPCS | Performed by: PHYSICIAN ASSISTANT

## 2021-07-13 PROCEDURE — 99214 OFFICE O/P EST MOD 30 MIN: CPT | Performed by: PHYSICIAN ASSISTANT

## 2021-07-13 PROCEDURE — G8427 DOCREV CUR MEDS BY ELIG CLIN: HCPCS | Performed by: PHYSICIAN ASSISTANT

## 2021-07-13 RX ORDER — BUDESONIDE AND FORMOTEROL FUMARATE DIHYDRATE 160; 4.5 UG/1; UG/1
2 AEROSOL RESPIRATORY (INHALATION) 2 TIMES DAILY
Qty: 1 INHALER | Refills: 5 | Status: SHIPPED
Start: 2021-07-13 | End: 2022-01-31 | Stop reason: SDUPTHER

## 2021-07-13 RX ORDER — ALBUTEROL SULFATE 90 UG/1
2 AEROSOL, METERED RESPIRATORY (INHALATION) EVERY 4 HOURS PRN
Qty: 18 G | Refills: 5 | Status: SHIPPED
Start: 2021-07-13 | End: 2022-01-31 | Stop reason: SDUPTHER

## 2021-07-13 RX ORDER — NYSTATIN 100000 U/G
CREAM TOPICAL
Qty: 15 G | Refills: 0 | Status: SHIPPED
Start: 2021-07-13 | End: 2022-03-02

## 2021-07-13 RX ORDER — DEXAMETHASONE SODIUM PHOSPHATE 4 MG/ML
4 INJECTION, SOLUTION INTRA-ARTICULAR; INTRALESIONAL; INTRAMUSCULAR; INTRAVENOUS; SOFT TISSUE ONCE
Status: COMPLETED | OUTPATIENT
Start: 2021-07-13 | End: 2021-07-13

## 2021-07-13 RX ORDER — KETOTIFEN FUMARATE 0.35 MG/ML
1 SOLUTION/ DROPS OPHTHALMIC 2 TIMES DAILY
Qty: 1 ML | Refills: 1 | Status: SHIPPED | OUTPATIENT
Start: 2021-07-13 | End: 2021-07-23

## 2021-07-13 RX ORDER — FLUTICASONE PROPIONATE 50 MCG
2 SPRAY, SUSPENSION (ML) NASAL DAILY
Qty: 1 BOTTLE | Refills: 1 | Status: SHIPPED
Start: 2021-07-13 | End: 2021-08-23 | Stop reason: SDUPTHER

## 2021-07-13 RX ORDER — MONTELUKAST SODIUM 10 MG/1
10 TABLET ORAL NIGHTLY
Qty: 30 TABLET | Refills: 5 | Status: SHIPPED
Start: 2021-07-13 | End: 2021-09-07 | Stop reason: SDUPTHER

## 2021-07-13 RX ORDER — PREDNISONE 20 MG/1
20 TABLET ORAL 2 TIMES DAILY
Qty: 10 TABLET | Refills: 0 | Status: SHIPPED
Start: 2021-07-13 | End: 2021-07-15 | Stop reason: ALTCHOICE

## 2021-07-13 RX ADMIN — DEXAMETHASONE SODIUM PHOSPHATE 4 MG: 4 INJECTION, SOLUTION INTRA-ARTICULAR; INTRALESIONAL; INTRAMUSCULAR; INTRAVENOUS; SOFT TISSUE at 08:26

## 2021-07-13 NOTE — PROGRESS NOTES
Chief Complaint   Patient presents with    Asthma       HPI:  Patient is here for follow-up of asthma and HTN. Patient complains of sob in the heat. This is worse at night. She is using inhalers as directed. She has pruritic eyes and nasal congestion. Hypertension:  Patient is here for follow up chronic hypertension. This is  generally controlled on current medication regimen. Takes meds as directed and tolerates them well. Most recent labs reviewed with patient and are not remarkable. No symptoms from htn standpoint per ROS. Patient is  compliant with lifestyle modifications. Patient does smoke. Comorbid conditions include hyperlipidemia, well controlled on statin. Patient's past medical, surgical, social and/or family history reviewed, updated in chart, and are non-contributory (unless otherwise stated). Medications and allergies also reviewed and updated in chart. Review of Systems:  Constitutional:  No fever, no fatigue, no chills, no headaches, no weight change  Dermatology:  No rash, no mole, no dry or sensitive skin  ENT:  No cough, no sore throat, no sinus pain, + runny nose, no ear pain  Cardiology:  No chest pain, no palpitations, no leg edema, no shortness of breath, no PND  Gastroenterology:  No dysphagia, no abdominal pain, no nausea, no vomiting, no constipation, no diarrhea, no heartburn  Musculoskeletal:  No joint pain, no leg cramps, no back pain, no muscle aches  Respiratory:  No shortness of breath, no orthopnea, no wheezing, no ALMEIDA, no hemoptysis  Urology:  No blood in the urine, no urinary frequency, no urinary incontinence, no urinary urgency, no nocturia, no dysuria      Vitals:    07/13/21 0756 07/13/21 0816   BP: (!) 140/84 132/64   Pulse: 64    Resp: 16    Temp: 97.4 °F (36.3 °C)    Weight: 151 lb (68.5 kg)    Height: 5' 5\" (1.651 m)        Physical Exam  Constitutional:       General: She is not in acute distress. Appearance: She is well-developed. HENT:      Head: Normocephalic and atraumatic. Right Ear: External ear normal.      Left Ear: External ear normal.      Nose: Nose normal.   Eyes:      General: No scleral icterus. Conjunctiva/sclera: Conjunctivae normal.      Pupils: Pupils are equal, round, and reactive to light. Neck:      Thyroid: No thyromegaly. Cardiovascular:      Rate and Rhythm: Normal rate and regular rhythm. Heart sounds: Normal heart sounds. No murmur heard. Pulmonary:      Effort: Pulmonary effort is normal. No accessory muscle usage or respiratory distress. Breath sounds: Normal breath sounds. No wheezing. Musculoskeletal:         General: Normal range of motion. Cervical back: Normal range of motion and neck supple. Skin:     General: Skin is warm and dry. Findings: No rash. Neurological:      Mental Status: She is alert and oriented to person, place, and time. Deep Tendon Reflexes: Reflexes are normal and symmetric. Psychiatric:         Speech: Speech normal.         Behavior: Behavior normal.         Assessment/Plan:      Lawrence Melchor was seen today for asthma. Diagnoses and all orders for this visit:    Mild intermittent asthma without complication  -     budesonide-formoterol (SYMBICORT) 160-4.5 MCG/ACT AERO; Inhale 2 puffs into the lungs 2 times daily  -     albuterol sulfate  (90 Base) MCG/ACT inhaler; Inhale 2 puffs into the lungs every 4 hours as needed for Wheezing  -     montelukast (SINGULAIR) 10 MG tablet; Take 1 tablet by mouth nightly  -     predniSONE (DELTASONE) 20 MG tablet;  Take 1 tablet by mouth 2 times daily for 5 days  -     dexamethasone (DECADRON) injection 4 mg    Seasonal allergies  -     fluticasone (FLONASE) 50 MCG/ACT nasal spray; 2 sprays by Nasal route daily    Nasal congestion  -     fluticasone (FLONASE) 50 MCG/ACT nasal spray; 2 sprays by Nasal route daily    Itchy eyes  -     ketotifen (ZADITOR) 0.025 % ophthalmic solution; Place 1 drop into both eyes 2 times daily for 10 days    Essential hypertension    Tobacco abuse    Dermatitis  -     nystatin (MYCOSTATIN) 052506 UNIT/GM cream; Apply topically 2 times daily. Colon cancer screening  -     Cologuard (For External Results Only); Future      As above. Call or go to ED immediately if symptoms worsen or persist.  Return in about 3 months (around 10/13/2021) for htn., or sooner if necessary. Educational materials and/or home exercises printed for patient's review and were included in patient instructions on his/her After Visit Summary and given to patient at the end of visit. Counseled regarding above diagnosis, including possible risks and complications,  especially if left uncontrolled. Counseled regarding the possible side effects, risks, benefits and alternatives to treatment; patient and/or guardian verbalizes understanding, agrees, feels comfortable with and wishes to proceed with above treatment plan. Advised patient to call with any new medication issues, and read all Rx info from pharmacy to assure aware of all possible risks and side effects of medication before taking. Reviewed age and gender appropriate health screening exams and vaccinations. Advised patient regarding importance of keeping up with recommended health maintenance and to schedule as soon as possible if overdue, as this is important in assessing for undiagnosed pathology, especially cancer, as well as protecting against potentially harmful/life threatening disease. Patient and/or guardian verbalizes understanding and agrees with above counseling, assessment and plan. All questions answered. Mateo Carrion PA-C  7/13/2021    I have personally reviewed and updated the chief complaint, HPI, Past Medical, Family and Social History, as well as the above Review of Systems.

## 2021-07-15 ENCOUNTER — OFFICE VISIT (OUTPATIENT)
Dept: FAMILY MEDICINE CLINIC | Age: 66
End: 2021-07-15
Payer: COMMERCIAL

## 2021-07-15 ENCOUNTER — NURSE TRIAGE (OUTPATIENT)
Dept: OTHER | Facility: CLINIC | Age: 66
End: 2021-07-15

## 2021-07-15 VITALS
TEMPERATURE: 97 F | HEIGHT: 65 IN | OXYGEN SATURATION: 98 % | WEIGHT: 155 LBS | DIASTOLIC BLOOD PRESSURE: 80 MMHG | SYSTOLIC BLOOD PRESSURE: 122 MMHG | RESPIRATION RATE: 14 BRPM | HEART RATE: 71 BPM | BODY MASS INDEX: 25.83 KG/M2

## 2021-07-15 DIAGNOSIS — M54.41 ACUTE BILATERAL LOW BACK PAIN WITH RIGHT-SIDED SCIATICA: ICD-10-CM

## 2021-07-15 DIAGNOSIS — R30.0 DYSURIA: Primary | ICD-10-CM

## 2021-07-15 DIAGNOSIS — N64.4 BREAST PAIN: ICD-10-CM

## 2021-07-15 DIAGNOSIS — R07.9 CHEST PAIN, UNSPECIFIED TYPE: ICD-10-CM

## 2021-07-15 DIAGNOSIS — I51.7 LEFT VENTRICULAR HYPERTROPHY BY ELECTROCARDIOGRAM: ICD-10-CM

## 2021-07-15 LAB
BILIRUBIN, POC: NORMAL
BLOOD URINE, POC: NORMAL
CLARITY, POC: NORMAL
COLOR, POC: YELLOW
GLUCOSE URINE, POC: NORMAL
KETONES, POC: NORMAL
LEUKOCYTE EST, POC: NORMAL
NITRITE, POC: NORMAL
PH, POC: 5.5
PROTEIN, POC: NORMAL
SPECIFIC GRAVITY, POC: 1.02
UROBILINOGEN, POC: 0.2

## 2021-07-15 PROCEDURE — 81002 URINALYSIS NONAUTO W/O SCOPE: CPT | Performed by: FAMILY MEDICINE

## 2021-07-15 PROCEDURE — 99214 OFFICE O/P EST MOD 30 MIN: CPT | Performed by: FAMILY MEDICINE

## 2021-07-15 PROCEDURE — 93000 ELECTROCARDIOGRAM COMPLETE: CPT | Performed by: FAMILY MEDICINE

## 2021-07-15 PROCEDURE — G8427 DOCREV CUR MEDS BY ELIG CLIN: HCPCS | Performed by: FAMILY MEDICINE

## 2021-07-15 PROCEDURE — 3017F COLORECTAL CA SCREEN DOC REV: CPT | Performed by: FAMILY MEDICINE

## 2021-07-15 PROCEDURE — 4004F PT TOBACCO SCREEN RCVD TLK: CPT | Performed by: FAMILY MEDICINE

## 2021-07-15 PROCEDURE — G8419 CALC BMI OUT NRM PARAM NOF/U: HCPCS | Performed by: FAMILY MEDICINE

## 2021-07-15 RX ORDER — CYCLOBENZAPRINE HCL 5 MG
5 TABLET ORAL NIGHTLY PRN
Qty: 30 TABLET | Refills: 0 | Status: SHIPPED
Start: 2021-07-15 | End: 2021-08-23 | Stop reason: SDUPTHER

## 2021-07-15 RX ORDER — MELOXICAM 7.5 MG/1
7.5 TABLET ORAL DAILY PRN
Qty: 30 TABLET | Refills: 0 | Status: SHIPPED
Start: 2021-07-15 | Stop reason: SDUPTHER

## 2021-07-15 NOTE — TELEPHONE ENCOUNTER
Received call from Odalys Mackenzie at [employer] with [employer]. Brief description of triage: right knee swelling     Triage indicates for patient to be seen with the next 3 days    Care advice provided, patient verbalizes understanding; denies any other questions or concerns; instructed to call back for any new or worsening symptoms. Writer provided warm transfer to Bhupinder Ding at [employer] for appointment scheduling. Attention Provider: Thank you for allowing me to participate in the care of your patient. The patient was connected to triage in response to information provided to the [facility]. Please do not respond through this encounter as the response is not directed to a shared pool. Reason for Disposition   [1] MODERATE pain (e.g., interferes with normal activities, limping) AND [2] present > 3 days    Answer Assessment - Initial Assessment Questions  1. LOCATION: \"Where is the swelling located? \"  (e.g., left, right, both knees)      Right knee- on the the side    2. SIZE and DESCRIPTION: \"What does the swelling look like? \"  (e.g., entire knee, localized)      Side of knee, half of a golfball    3. ONSET: \"When did the swelling start? \" \"Does it come and go, or is it there all the time? \"      Yesterday afternoon    4. PAIN: \"Is there any pain? \" If so, ask: \"How bad is it? \" (Scale 1-10; or mild, moderate, severe)      Yes the pain this morning was unbearable in my lower back    5. SETTING: \"Has there been any recent work, exercise or other activity that involved that part of the body? \"       Denies    6. AGGRAVATING FACTORS: \"What makes the knee swelling worse? \" (e.g., walking, climbing stairs, running)      Working and walking on it actually makes it better    7. ASSOCIATED SYMPTOMS: \"Is there any pain or redness? \"      Denies    8. OTHER SYMPTOMS: \"Do you have any other symptoms? \" (e.g., chest pain, difficulty breathing, fever, calf pain)      Back pain    9.  PREGNANCY: \"Is there any chance you are pregnant? \" \"When was your last menstrual period? \"      NA       *believes it is caused by her recent medication*    Protocols used: KNEE [facility]

## 2021-07-15 NOTE — PROGRESS NOTES
breath, no orthopnea, no wheezing, no ALMEIDA, no hemoptysis  Urology:  No blood in the urine, no urinary frequency, no urinary incontinence, no urinary urgency, no nocturia, + dysuria      Vitals:    07/15/21 1511   BP: 122/80   Pulse: 71   Resp: 14   Temp: 97 °F (36.1 °C)   SpO2: 98%   Weight: 155 lb (70.3 kg)   Height: 5' 5\" (1.651 m)       Physical Exam  Vitals and nursing note reviewed. Constitutional:       Appearance: She is well-developed. HENT:      Head: Normocephalic and atraumatic. Right Ear: External ear normal.      Left Ear: External ear normal.      Nose: Nose normal.   Eyes:      Conjunctiva/sclera: Conjunctivae normal.      Pupils: Pupils are equal, round, and reactive to light. Neck:      Thyroid: No thyromegaly. Cardiovascular:      Rate and Rhythm: Normal rate and regular rhythm. Heart sounds: Normal heart sounds. Pulmonary:      Effort: Pulmonary effort is normal.      Breath sounds: Normal breath sounds. No wheezing. Abdominal:      General: Bowel sounds are normal.      Palpations: Abdomen is soft. Tenderness: There is no abdominal tenderness. Musculoskeletal:      Cervical back: Normal range of motion and neck supple. Lumbar back: Tenderness present. Decreased range of motion. Skin:     General: Skin is warm and dry. Findings: No rash. Neurological:      Mental Status: She is alert and oriented to person, place, and time. Deep Tendon Reflexes: Reflexes are normal and symmetric. Psychiatric:         Behavior: Behavior normal.         Assessment/Plan:      Juanita Askew was seen today for back pain and knee pain. Diagnoses and all orders for this visit:    Dysuria  -     POCT Urinalysis no Micro  UA negative    Chest pain, unspecified type  -     EKG 12 Lead  EKG showed sinus rhythm but LVH  Will order an echo    Breast pain  -     EM DIGITAL DIAGNOSTIC BILATERAL PER PROTOCOL;  Future    Acute bilateral low back pain with right-sided sciatica  - XR LUMBAR SPINE (MIN 4 VIEWS); Future  -     cyclobenzaprine (FLEXERIL) 5 MG tablet; Take 1 tablet by mouth nightly as needed for Muscle spasms  -     meloxicam (MOBIC) 7.5 MG tablet; Take 1 tablet by mouth daily as needed for Pain  RICE therapy  Will start mobic  Will start flexeril  X-ray ordered      As above. Call or go to ED immediately if symptoms worsen or persist.  No follow-ups on file. , or sooner if necessary. Educational materials and/or home exercises printed for patient's review and were included in patient instructions on his/her After Visit Summary and given to patient at the end of visit. Counseled regarding above diagnosis, including possible risks and complications,  especially if left uncontrolled. Counseled regarding the possible side effects, risks, benefits and alternatives to treatment; patient and/or guardian verbalizes understanding, agrees, feels comfortable with and wishes to proceed with above treatment plan. Advised patient to call with any new medication issues, and read all Rx info from pharmacy to assure aware of all possible risks and side effects of medication before taking. Reviewed age and gender appropriate health screening exams and vaccinations. Advised patient regarding importance of keeping up with recommended health maintenance and to schedule as soon as possible if overdue, as this is important in assessing for undiagnosed pathology, especially cancer, as well as protecting against potentially harmful/life threatening disease. Patient and/or guardian verbalizes understanding and agrees with above counseling, assessment and plan. All questions answered. Glenny Miller DO  7/15/2021    I have personally reviewed and updated the chief complaint, HPI, Past Medical, Family and Social History, as well as the above Review of Systems.

## 2021-07-16 LAB — FECAL BLOOD IMMUNOCHEMICAL TEST: POSITIVE

## 2021-07-19 ENCOUNTER — NURSE TRIAGE (OUTPATIENT)
Dept: OTHER | Facility: CLINIC | Age: 66
End: 2021-07-19

## 2021-07-19 NOTE — TELEPHONE ENCOUNTER
Red Flag Complaint. Brief description of triage: Pt called with concern of R LL edema from the knee down. Pt has been recently seen for R knee pain/swelling, but swelling has become increasingly worse. Pt had radiographs of limb taken today, but did not see a provider. AVS directs pt to ED for new/worsening symptoms. Pt already has f/u appt with provider tomorrow at 3pm.     Triage indicates for patient to ED now. Care advice provided, patient verbalizes understanding; denies any other questions or concerns; instructed to call back for any new or worsening symptoms. Attention Provider: Thank you for allowing me to participate in the care of your patient. The patient was connected to triage in response to information provided to the ECC. Please do not respond through this encounter as the response is not directed to a shared pool.

## 2021-07-20 ENCOUNTER — OFFICE VISIT (OUTPATIENT)
Dept: FAMILY MEDICINE CLINIC | Age: 66
End: 2021-07-20
Payer: COMMERCIAL

## 2021-07-20 VITALS
HEART RATE: 79 BPM | HEIGHT: 65 IN | WEIGHT: 151.6 LBS | TEMPERATURE: 97.9 F | DIASTOLIC BLOOD PRESSURE: 68 MMHG | OXYGEN SATURATION: 98 % | RESPIRATION RATE: 18 BRPM | SYSTOLIC BLOOD PRESSURE: 198 MMHG | BODY MASS INDEX: 25.26 KG/M2

## 2021-07-20 DIAGNOSIS — M25.561 ACUTE PAIN OF RIGHT KNEE: ICD-10-CM

## 2021-07-20 DIAGNOSIS — M25.361 KNEE INSTABILITY, RIGHT: ICD-10-CM

## 2021-07-20 DIAGNOSIS — I10 ESSENTIAL HYPERTENSION: ICD-10-CM

## 2021-07-20 DIAGNOSIS — M54.41 ACUTE BILATERAL LOW BACK PAIN WITH RIGHT-SIDED SCIATICA: Primary | ICD-10-CM

## 2021-07-20 PROCEDURE — G8419 CALC BMI OUT NRM PARAM NOF/U: HCPCS | Performed by: FAMILY MEDICINE

## 2021-07-20 PROCEDURE — 99214 OFFICE O/P EST MOD 30 MIN: CPT | Performed by: FAMILY MEDICINE

## 2021-07-20 PROCEDURE — 3017F COLORECTAL CA SCREEN DOC REV: CPT | Performed by: FAMILY MEDICINE

## 2021-07-20 PROCEDURE — G8427 DOCREV CUR MEDS BY ELIG CLIN: HCPCS | Performed by: FAMILY MEDICINE

## 2021-07-20 PROCEDURE — 4004F PT TOBACCO SCREEN RCVD TLK: CPT | Performed by: FAMILY MEDICINE

## 2021-07-20 RX ORDER — LISINOPRIL 20 MG/1
20 TABLET ORAL DAILY
Qty: 30 TABLET | Refills: 5 | Status: SHIPPED
Start: 2021-07-20 | End: 2022-03-02

## 2021-07-20 RX ORDER — CLONIDINE HYDROCHLORIDE 0.1 MG/1
0.1 TABLET ORAL ONCE
Status: COMPLETED | OUTPATIENT
Start: 2021-07-20 | End: 2021-07-20

## 2021-07-20 RX ADMIN — CLONIDINE HYDROCHLORIDE 0.1 MG: 0.1 TABLET ORAL at 15:42

## 2021-07-20 NOTE — PROGRESS NOTES
Chief Complaint   Patient presents with    Knee Pain       HPI:  Patient is here for follow-up of right knee pain. She states that she was started on prednisone and she woke up out of no where and is having pain down her right leg, into her right knee. she had the x-ray of her back done at [address] [place] [address] but we do not have results yet. She is walking with a cane today. Pain is sharp in nature. Pain is 6 out of 10. She states she was crying the pain was so bad. Alleviating factors: tylenol. Exacerbating factors: nothing. She stopped taking the prednisone because she was afraid it would make her symptoms worse. She has been wearing a knee brace. She does have swelling in her knee. She denies any trauma or injury. She has been taking flexeril and mobic as prescribed. She is requesting a knee brace. She is interested in physical therapy. Patient's past medical, surgical, social and/or family history reviewed, updated in chart, and are non-contributory (unless otherwise stated). Medications and allergies also reviewed and updated in chart.     Review of Systems:  Constitutional:  No fever, no fatigue, no chills, no headaches, no weight change  Dermatology:  No rash, no mole, no dry or sensitive skin  ENT:  No cough, no sore throat, no sinus pain, no runny nose, no ear pain  Cardiology:  No chest pain, no palpitations, no leg edema, no shortness of breath, no PND  Gastroenterology:  No dysphagia, no abdominal pain, no nausea, no vomiting, no constipation, no diarrhea, no heartburn  Musculoskeletal:  + joint pain, no leg cramps, + back pain, no muscle aches  Respiratory:  No shortness of breath, no orthopnea, no wheezing, no ALMEIDA, no hemoptysis  Urology:  No blood in the urine, no urinary frequency, no urinary incontinence, no urinary urgency, no nocturia, no dysuria      Vitals:    07/20/21 1513 07/20/21 1517   BP: (!) 204/92 (!) 198/68   Pulse: 79    Resp: 18    Temp: 97.9 °F (36.6 °C)    SpO2: 98%    Weight: 151 lb 9.6 oz (68.8 kg)    Height: 5' 5\" (1.651 m)        Physical Exam  Vitals and nursing note reviewed. Constitutional:       Appearance: She is well-developed. HENT:      Head: Normocephalic and atraumatic. Right Ear: External ear normal.      Left Ear: External ear normal.      Nose: Nose normal.   Eyes:      Conjunctiva/sclera: Conjunctivae normal.      Pupils: Pupils are equal, round, and reactive to light. Neck:      Thyroid: No thyromegaly. Cardiovascular:      Rate and Rhythm: Normal rate and regular rhythm. Heart sounds: Murmur heard. Pulmonary:      Effort: Pulmonary effort is normal.      Breath sounds: Wheezing present. Abdominal:      General: Bowel sounds are normal.      Palpations: Abdomen is soft. Tenderness: There is no abdominal tenderness. Musculoskeletal:      Cervical back: Normal range of motion and neck supple. Right knee: Bony tenderness present. Decreased range of motion. Tenderness present over the medial joint line and lateral joint line. No MCL tenderness. MCL laxity present. Abnormal meniscus. Instability Tests: Medial Nicolette test positive. Skin:     General: Skin is warm and dry. Findings: No rash. Neurological:      Mental Status: She is alert and oriented to person, place, and time. Deep Tendon Reflexes: Reflexes are normal and symmetric. Psychiatric:         Behavior: Behavior normal.         Assessment/Plan:      Debbie Murray was seen today for knee pain. Diagnoses and all orders for this visit:    Acute bilateral low back pain with right-sided sciatica  -     Amb External Referral To Physical Therapy  RICE therapy discussed in detail  Will await x-ray    Acute pain of right knee  -     Amb External Referral To Physical Therapy  -     Elastic Bandages & Supports (KNEE BRACE) MISC; Soft neutral position right knee brace  Size to fit  Dx:  Knee pain  -     XR KNEE RIGHT (3 VIEWS);  Future  -     MRI KNEE RIGHT WO CONTRAST; Future  RICE therapy  X-ray ordered  Knee brace ordered  MRI ordered. Knee instability, right  -     MRI KNEE RIGHT WO CONTRAST; Future  RICE therapy  X-ray ordered  Knee brace ordered  MRI ordered. As above. Call or go to ED immediately if symptoms worsen or persist.  Return in about 1 day (around 7/21/2021) for htn., or sooner if necessary. Educational materials and/or home exercises printed for patient's review and were included in patient instructions on his/her After Visit Summary and given to patient at the end of visit. Counseled regarding above diagnosis, including possible risks and complications,  especially if left uncontrolled. Counseled regarding the possible side effects, risks, benefits and alternatives to treatment; patient and/or guardian verbalizes understanding, agrees, feels comfortable with and wishes to proceed with above treatment plan. Advised patient to call with any new medication issues, and read all Rx info from pharmacy to assure aware of all possible risks and side effects of medication before taking. Reviewed age and gender appropriate health screening exams and vaccinations. Advised patient regarding importance of keeping up with recommended health maintenance and to schedule as soon as possible if overdue, as this is important in assessing for undiagnosed pathology, especially cancer, as well as protecting against potentially harmful/life threatening disease. Patient and/or guardian verbalizes understanding and agrees with above counseling, assessment and plan. All questions answered. Holly Olguin DO  7/20/2021    I have personally reviewed and updated the chief complaint, HPI, Past Medical, Family and Social History, as well as the above Review of Systems.

## 2021-07-20 NOTE — PATIENT INSTRUCTIONS
Patient Education        lisinopril  Pronunciation:  marycarmen [place] [facility]  Brand:  Prinivil, Qbrelis, Zestril  What is the most important information I should know about lisinopril? Do not use if you are pregnant, and tell your doctor right away if you become pregnant. If you have diabetes, do not use lisinopril together with any medication that contains aliskiren (a blood pressure medicine). Do not take lisinopril within 36 hours before or after taking medicine that contains sacubitril (such as Entresto). What is lisinopril? Lisinopril is an ACE inhibitor that is used to treat high blood pressure (hypertension) in adults and children who are at least 10years old. Lisinopril is also used to treat congestive heart failure in adults, or to improve survival after a heart attack. Lisinopril may also be used for purposes not listed in this medication guide. What should I discuss with my healthcare provider before taking lisinopril? You should not use lisinopril if you are allergic to it, or if you:  · have a history of angioedema;  · recently took a heart medicine called sacubitril; or  · are allergic to any other ACE inhibitor, such as benazepril, captopril, enalapril, fosinopril, moexipril, perindopril, quinapril, ramipril, or trandolapril. Do not take lisinopril within 36 hours before or after taking medicine that contains sacubitril (such as Entresto). If you have diabetes, do not use lisinopril together with any medication that contains aliskiren (a blood pressure medicine). You may also need to avoid taking lisinopril with aliskiren if you have kidney disease. Tell your doctor if you have ever had:  · kidney disease (or if you are on dialysis);  · liver disease; or  · high levels of potassium in your blood. Do not use if you are pregnant, and tell your doctor right away if you become pregnant.  Lisinopril can cause injury or death to the unborn baby if you take the medicine during your second or third trimester. You should not breastfeed while using this medicine. How should I take lisinopril? Follow all directions on your prescription label and read all medication guides or instruction sheets. Your doctor may occasionally change your dose. Use the medicine exactly as directed. Drink plenty of water each day while you are taking this medicine. Lisinopril can be taken with or without food. Measure liquid medicine carefully. Use the dosing syringe provided, or use a medicine dose-measuring device (not a kitchen spoon). Your blood pressure will need to be checked often. Your kidney function and electrolytes may also need to be checked. Call your doctor if you are sick with vomiting or diarrhea, or if you are sweating more than usual. You can easily become dehydrated while taking lisinopril. This can lead to very low blood pressure, a serious electrolyte imbalance, or kidney failure. If you need surgery, tell the surgeon ahead of time that you are using lisinopril. If you have high blood pressure, keep using this medicine even if you feel well. High blood pressure often has no symptoms. You may need to use blood pressure medicine for the rest of your life. Store at room temperature away from moisture and heat. Do not freeze the oral liquid. What happens if I miss a dose? Take the medicine as soon as you can, but skip the missed dose if it is almost time for your next dose. Do not take two doses at one time. What happens if I overdose? Seek emergency medical attention or call the Poison Help line at 1-105.441.1031. What should I avoid while taking lisinopril? Drinking alcohol can further lower your blood pressure and may increase certain side effects of lisinopril. Avoid becoming overheated or dehydrated during exercise, in hot weather, or by not drinking enough fluids. Lisinopril can decrease sweating and you may be more prone to heat stroke.   Do not use potassium supplements or salt substitutes, unless your doctor has told you to. Avoid getting up too fast from a sitting or lying position, or you may feel dizzy. What are the possible side effects of lisinopril? Get emergency medical help if you have signs of an allergic reaction: hives; severe stomach pain; difficulty breathing; swelling of your face, lips, tongue, or throat. You may be more likely to have an allergic reaction if you are -American. Call your doctor at once if you have:  · a light-headed feeling, like you might pass out;  · fever, sore throat;  · high potassium --nausea, weakness, tingly feeling, chest pain, irregular heartbeats, loss of movement;  · kidney problems --little or no urination, swelling in your feet or ankles, feeling tired or short of breath; or  · liver problems --nausea, upper stomach pain, itching, tired feeling, loss of appetite, dark urine, minh-colored stools, jaundice (yellowing of the skin or eyes). Common side effects may include:  · headache, dizziness;  · cough; or  · chest pain. This is not a complete list of side effects and others may occur. Call your doctor for medical advice about side effects. You may report side effects to FDA at 0-666-FDA-0054. What other drugs will affect lisinopril? Tell your doctor about all your other medicines, especially:  · a diuretic or \"water pill\";  · lithium;  · gold injections to treat arthritis;  · insulin or oral diabetes medicine;  · a potassium supplement;  · medicine to prevent organ transplant rejection --everolimus, sirolimus, tacrolimus, temsirolimus; or  · NSAIDs (nonsteroidal anti-inflammatory drugs) --aspirin, ibuprofen (Advil, Motrin), naproxen (Aleve), celecoxib, diclofenac, indomethacin, meloxicam, and others. This list is not complete. Other drugs may affect lisinopril, including prescription and over-the-counter medicines, vitamins, and herbal products. Not all possible drug interactions are listed here. Where can I get more information?   Your pharmacist can provide more information about lisinopril. Remember, keep this and all other medicines out of the reach of children, never share your medicines with others, and use this medication only for the indication prescribed. Every effort has been made to ensure that the information provided by [address] is accurate, up-to-date, and complete, but no guarantee is made to that effect. Drug information contained herein may be time sensitive. [facility] information has been compiled for use by healthcare practitioners and consumers in the [place] and therefore [facility] does not warrant that uses outside of the [place] are appropriate, unless specifically indicated otherwise. [facility]'s drug information does not endorse drugs, diagnose patients or recommend therapy. [facility]'s drug information is an informational resource designed to assist licensed healthcare practitioners in caring for their patients and/or to serve consumers viewing this service as a supplement to, and not a substitute for, the expertise, skill, knowledge and judgment of healthcare practitioners. The absence of a warning for a given drug or drug combination in no way should be construed to indicate that the drug or drug combination is safe, effective or appropriate for any given patient. [facility] does not assume any responsibility for any aspect of healthcare administered with the aid of information [facility] provides. The information contained herein is not intended to cover all possible uses, directions, precautions, warnings, drug interactions, allergic reactions, or adverse effects. If you have questions about the drugs you are taking, check with your doctor, nurse or pharmacist.  Copyright 4356-6562 [employer] [address] [place] [address] Avenue: 15.03. Revision date: 10/22/2019. Care instructions adapted under license by [facility] ([facility]).  If you have questions about a medical condition or this instruction, always ask your healthcare professional. [address] any warranty or liability for your use of this information.

## 2021-07-21 ENCOUNTER — NURSE ONLY (OUTPATIENT)
Dept: FAMILY MEDICINE CLINIC | Age: 66
End: 2021-07-21

## 2021-07-21 ENCOUNTER — TELEPHONE (OUTPATIENT)
Dept: FAMILY MEDICINE CLINIC | Age: 66
End: 2021-07-21

## 2021-07-21 VITALS — DIASTOLIC BLOOD PRESSURE: 84 MMHG | SYSTOLIC BLOOD PRESSURE: 138 MMHG

## 2021-07-21 NOTE — LETTER
[address] [place] [address]  Phone: [phone]  Fax: [phone]          July 21, 2021     Patient: Wade Singletary   YOB: 1955   Date of Visit: 7/21/2021       To Whom It May Concern: It is my medical opinion that Aditya Dumas may return to work on 08/02/2021. If you have any questions or concerns, please don't hesitate to call.     Sincerely,        Noel Amin, DO

## 2021-07-22 ENCOUNTER — TELEPHONE (OUTPATIENT)
Dept: FAMILY MEDICINE CLINIC | Age: 66
End: 2021-07-22

## 2021-07-23 DIAGNOSIS — M54.41 ACUTE BILATERAL LOW BACK PAIN WITH RIGHT-SIDED SCIATICA: ICD-10-CM

## 2021-08-02 DIAGNOSIS — M1A.00X0 IDIOPATHIC CHRONIC GOUT WITHOUT TOPHUS, UNSPECIFIED SITE: ICD-10-CM

## 2021-08-02 RX ORDER — ALLOPURINOL 100 MG/1
TABLET ORAL
Qty: 60 TABLET | Refills: 5 | Status: SHIPPED
Start: 2021-08-02 | End: 2021-09-07 | Stop reason: SDUPTHER

## 2021-08-11 ENCOUNTER — HOSPITAL ENCOUNTER (OUTPATIENT)
Dept: NON INVASIVE DIAGNOSTICS | Age: 66
Discharge: HOME | End: 2021-08-11
Payer: COMMERCIAL

## 2021-08-11 DIAGNOSIS — R07.9 CHEST PAIN, UNSPECIFIED TYPE: ICD-10-CM

## 2021-08-11 DIAGNOSIS — I51.7 LEFT VENTRICULAR HYPERTROPHY BY ELECTROCARDIOGRAM: ICD-10-CM

## 2021-08-11 LAB
LV EF: 53 %
LVEF MODALITY: NORMAL

## 2021-08-11 PROCEDURE — 93306 TTE W/DOPPLER COMPLETE: CPT

## 2021-08-12 ENCOUNTER — HOSPITAL ENCOUNTER (OUTPATIENT)
Dept: MRI IMAGING | Age: 66
Discharge: HOME | End: 2021-08-14
Payer: COMMERCIAL

## 2021-08-12 DIAGNOSIS — M25.561 ACUTE PAIN OF RIGHT KNEE: ICD-10-CM

## 2021-08-12 DIAGNOSIS — M25.361 KNEE INSTABILITY, RIGHT: ICD-10-CM

## 2021-08-12 PROCEDURE — 73721 MRI JNT OF LWR EXTRE W/O DYE: CPT

## 2021-08-13 ENCOUNTER — TELEPHONE (OUTPATIENT)
Dept: FAMILY MEDICINE CLINIC | Age: 66
End: 2021-08-13

## 2021-08-13 DIAGNOSIS — M22.41 CHONDROMALACIA PATELLAE OF RIGHT KNEE: ICD-10-CM

## 2021-08-13 DIAGNOSIS — M22.42 CHONDROMALACIA PATELLAE OF LEFT KNEE: Primary | ICD-10-CM

## 2021-08-23 ENCOUNTER — TELEPHONE (OUTPATIENT)
Dept: FAMILY MEDICINE CLINIC | Age: 66
End: 2021-08-23

## 2021-08-23 ENCOUNTER — OFFICE VISIT (OUTPATIENT)
Dept: FAMILY MEDICINE CLINIC | Age: 66
End: 2021-08-23
Payer: COMMERCIAL

## 2021-08-23 ENCOUNTER — HOSPITAL ENCOUNTER (OUTPATIENT)
Dept: ULTRASOUND IMAGING | Age: 66
Discharge: HOME | End: 2021-08-25
Payer: COMMERCIAL

## 2021-08-23 ENCOUNTER — NURSE TRIAGE (OUTPATIENT)
Dept: OTHER | Facility: CLINIC | Age: 66
End: 2021-08-23

## 2021-08-23 VITALS
DIASTOLIC BLOOD PRESSURE: 84 MMHG | SYSTOLIC BLOOD PRESSURE: 134 MMHG | HEIGHT: 65 IN | WEIGHT: 150 LBS | BODY MASS INDEX: 24.99 KG/M2 | TEMPERATURE: 96.8 F

## 2021-08-23 DIAGNOSIS — M79.89 RIGHT LEG SWELLING: ICD-10-CM

## 2021-08-23 DIAGNOSIS — M54.41 ACUTE BILATERAL LOW BACK PAIN WITH RIGHT-SIDED SCIATICA: ICD-10-CM

## 2021-08-23 DIAGNOSIS — J30.2 SEASONAL ALLERGIES: ICD-10-CM

## 2021-08-23 DIAGNOSIS — R09.81 NASAL CONGESTION: ICD-10-CM

## 2021-08-23 DIAGNOSIS — Z12.11 COLON CANCER SCREENING: ICD-10-CM

## 2021-08-23 DIAGNOSIS — M25.561 ACUTE PAIN OF RIGHT KNEE: Primary | ICD-10-CM

## 2021-08-23 PROCEDURE — 3017F COLORECTAL CA SCREEN DOC REV: CPT | Performed by: FAMILY MEDICINE

## 2021-08-23 PROCEDURE — 99214 OFFICE O/P EST MOD 30 MIN: CPT | Performed by: FAMILY MEDICINE

## 2021-08-23 PROCEDURE — G8427 DOCREV CUR MEDS BY ELIG CLIN: HCPCS | Performed by: FAMILY MEDICINE

## 2021-08-23 PROCEDURE — 4004F PT TOBACCO SCREEN RCVD TLK: CPT | Performed by: FAMILY MEDICINE

## 2021-08-23 PROCEDURE — G8420 CALC BMI NORM PARAMETERS: HCPCS | Performed by: FAMILY MEDICINE

## 2021-08-23 PROCEDURE — 93971 EXTREMITY STUDY: CPT

## 2021-08-23 RX ORDER — MELOXICAM 7.5 MG/1
7.5 TABLET ORAL DAILY PRN
Qty: 30 TABLET | Refills: 0 | Status: SHIPPED
Start: 2021-08-23 | End: 2021-08-24 | Stop reason: SDUPTHER

## 2021-08-23 RX ORDER — CYCLOBENZAPRINE HCL 5 MG
5 TABLET ORAL NIGHTLY PRN
Qty: 30 TABLET | Refills: 0 | Status: SHIPPED
Start: 2021-08-23 | End: 2022-03-02 | Stop reason: SDUPTHER

## 2021-08-23 RX ORDER — FLUTICASONE PROPIONATE 50 MCG
2 SPRAY, SUSPENSION (ML) NASAL DAILY
Qty: 1 BOTTLE | Refills: 1 | Status: SHIPPED
Start: 2021-08-23 | End: 2022-03-02 | Stop reason: SDUPTHER

## 2021-08-23 NOTE — TELEPHONE ENCOUNTER
Reason for Disposition   Entire foot is cool or blue in comparison to other side    Answer Assessment - Initial Assessment Questions  1. ONSET: \"When did the swelling start? \" (e.g., minutes, hours, days)       2 weeks ago pt was off due to swelling and a knot on side of knee and MRI done and is being sent to a bone specialist        Concerned for the swelling not going down and at night it gets worse           2. LOCATION: \"What part of the leg is swollen? \"  \"Are both legs swollen or just one leg? \"        R leg and foot from knee down     3. SEVERITY: \"How bad is the swelling? \" (e.g., localized; mild, moderate, severe)   - Localized - small area of swelling localized to one leg   - MILD pedal edema - swelling limited to foot and ankle, pitting edema < 1/4 inch (6 mm) deep, rest and elevation eliminate most or all swelling   - MODERATE edema - swelling of lower leg to knee, pitting edema > 1/4 inch (6 mm) deep, rest and elevation only partially reduce swelling   - SEVERE edema - swelling extends above knee, facial or hand swelling present         Moderate- foot, ankle and to the knee     4. REDNESS: \"Does the swelling look red or infected? \"          Denies     5. PAIN: \"Is the swelling painful to touch? \" If so, ask: \"How painful is it? \"   (Scale 1-10; mild, moderate or severe)          In the knot on the knee at night 9/10 - the longer pt is on her feet during the day the worse the pain is at night- sometimes makes her cry           Currently 7.5 with Aspercreme on it           Pain gets worse when pt takes her shoe off, no matter when it is and her foot is severely swollen and has hard time getting shoe back on     6. FEVER: \"Do you have a fever? \" If so, ask: \"What is it, how was it measured, and when did it start? \"            Denies     7. CAUSE: \"What do you think is causing the leg swelling? \"         Pt was off work for 2 weeks and was going to therapy and she felt the therapy was hurting her more Pt states she was told it was sciatica but no explanation for the knot on bone and is seeing the specialist on the 25th to discuss         Pt was given a knee brace but states it makes it worse so she bought one at the drugstore     8. MEDICAL HISTORY: \"Do you have a history of heart failure, kidney disease, liver failure, or cancer? \"          Heart murmur and clogged valve and sees specialist Sept 30th for this     9. RECURRENT SYMPTOM: \"Have you had leg swelling before? \" If so, ask: \"When was the last time? \" \"What happened that time? \"           Denies prior to this episode     10. OTHER SYMPTOMS: \"Do you have any other symptoms? \" (e.g., chest pain, difficulty breathing)           At night pt states her leg gets real cold, foot is cold currently compared to the L denies CP and SOB     11. PREGNANCY: \"Is there any chance you are pregnant? \" \"When was your last menstrual period? \"           NA    Protocols used: LEG SWELLING AND EDEMA-ADULT-OH    Received call from Nadege  at [facility] with Red Flag Complaint. Brief description of triage: see above     Triage indicates for patient to go to ED now. Pt is declining to go and would prefer to see the doctor. Writer called PCP office and spoke to RALPH and was able to provide a warm transfer to her to take over the call for further recommendation . Care advice provided, patient verbalizes understanding; denies any other questions or concerns; instructed to call back for any new or worsening symptoms. Attention Provider: Thank you for allowing me to participate in the care of your patient. The patient was connected to triage in response to information provided to the [facility]. Please do not respond through this encounter as the response is not directed to a shared pool.

## 2021-08-23 NOTE — PROGRESS NOTES
Pain:  Patient is here today with complaints of right leg edema and pain. This is a/an acute problem. This has been going on for 4 weeks . Exacerbating factors include wearing shoes, standing too long. Alleviating factors include medication, ice. Pain is stabbing in nature. 7.5/10. Pain is not radiating. This has happen to patient before. Imaging to date includes MRI. Therapy to date includes she stopped going to PT because it was making the pain worse. She is scheduled to see Dr. Taras Joseph for her knee pain on Thursday. She is going to to back to therapy if he thinks she needs to. She has swelling in her right leg. Patient's past medical, surgical, social and/or family history reviewed, updated in chart, and are non-contributory (unless otherwise stated). Medications and allergies also reviewed and updated in chart. Review of Systems:  Constitutional:  No fever, no fatigue, no chills, no headaches, no weight change  Dermatology:  No rash, no mole, no dry or sensitive skin  ENT:  No cough, no sore throat, no sinus pain, no runny nose, no ear pain  Cardiology:  No chest pain, no palpitations, + leg edema, no shortness of breath, no PND  Gastroenterology:  No dysphagia, no abdominal pain, no nausea, no vomiting, no constipation, no diarrhea, no heartburn  Musculoskeletal:  + joint pain, no leg cramps, no back pain, no muscle aches  Respiratory:  No shortness of breath, no orthopnea, no wheezing, no ALMEIDA, no hemoptysis  Urology:  No blood in the urine, no urinary frequency, no urinary incontinence, no urinary urgency, no nocturia, no dysuria      Vitals:    08/23/21 1417   BP: 134/84   Temp: 96.8 °F (36 °C)   Weight: 150 lb (68 kg)   Height: 5' 5\" (1.651 m)       Physical Exam  Vitals and nursing note reviewed. Constitutional:       Appearance: She is well-developed. HENT:      Head: Normocephalic and atraumatic.       Right Ear: External ear normal.      Left Ear: External ear normal. Nose: Nose normal.   Eyes:      Conjunctiva/sclera: Conjunctivae normal.      Pupils: Pupils are equal, round, and reactive to light. Neck:      Thyroid: No thyromegaly. Cardiovascular:      Rate and Rhythm: Normal rate and regular rhythm. Heart sounds: Normal heart sounds. Pulmonary:      Effort: Pulmonary effort is normal.      Breath sounds: Normal breath sounds. No wheezing. Abdominal:      General: Bowel sounds are normal.      Palpations: Abdomen is soft. Tenderness: There is no abdominal tenderness. Musculoskeletal:         General: Swelling present. Cervical back: Normal range of motion and neck supple. Right knee: Swelling present. Decreased range of motion. Tenderness present over the medial joint line and lateral joint line. Abnormal meniscus. Right lower leg: Edema present. Skin:     General: Skin is warm and dry. Findings: No rash. Neurological:      Mental Status: She is alert and oriented to person, place, and time. Deep Tendon Reflexes: Reflexes are normal and symmetric. Psychiatric:         Behavior: Behavior normal.         Assessment/Plan:      Alessandra Pñea was seen today for leg swelling. Diagnoses and all orders for this visit:    Acute pain of right knee  RICE therapy  Scheduled to see orthopedic surgeon  Will await additional recommendations. Acute bilateral low back pain with right-sided sciatica  -     cyclobenzaprine (FLEXERIL) 5 MG tablet; Take 1 tablet by mouth nightly as needed for Muscle spasms    Seasonal allergies  -     fluticasone (FLONASE) 50 MCG/ACT nasal spray; 2 sprays by Nasal route daily    Nasal congestion  -     fluticasone (FLONASE) 50 MCG/ACT nasal spray; 2 sprays by Nasal route daily    Colon cancer screening  -     Cologuard (For External Results Only); Future    Right leg swelling  -     US DUP LOWER EXTREMITY RIGHT LUIS;  Future  Stat ultrasound negative  Swelling likely secondary to arthritis in her knee     As

## 2021-08-24 DIAGNOSIS — M54.41 ACUTE BILATERAL LOW BACK PAIN WITH RIGHT-SIDED SCIATICA: ICD-10-CM

## 2021-08-24 RX ORDER — MELOXICAM 7.5 MG/1
7.5 TABLET ORAL DAILY PRN
Qty: 30 TABLET | Refills: 5 | Status: SHIPPED
Start: 2021-08-24 | End: 2022-02-25

## 2021-08-24 NOTE — PATIENT INSTRUCTIONS
Patient Education        Leg and Ankle Edema: Care Instructions  Your Care Instructions  Swelling in the legs, ankles, and feet is called edema. It is common after you sit or stand for a while. Long plane flights or car rides often cause swelling in the legs and feet. You may also have swelling if you have to stand for long periods of time at your job. Problems with the veins in the legs (varicose veins) and changes in hormones can also cause swelling. Sometimes the swelling in the ankles and feet is caused by a more serious problem, such as heart failure, infection, blood clots, or liver or kidney disease. Follow-up care is a key part of your treatment and safety. Be sure to make and go to all appointments, and call your doctor if you are having problems. It's also a good idea to know your test results and keep a list of the medicines you take. How can you care for yourself at home? · If your doctor gave you medicine, take it as prescribed. Call your doctor if you think you are having a problem with your medicine. · Whenever you are resting, raise your legs up. Try to keep the swollen area higher than the level of your heart. · Take breaks from standing or sitting in one position. ? Walk around to increase the blood flow in your lower legs. ? Move your feet and ankles often while you stand, or tighten and relax your leg muscles. · Wear support stockings. Put them on in the morning, before swelling gets worse. · Eat a balanced diet. Lose weight if you need to. · Limit the amount of salt (sodium) in your diet. Salt holds fluid in the body and may increase swelling. When should you call for help? Call 911 anytime you think you may need emergency care. For example, call if:    · You have symptoms of a blood clot in your lung (called a pulmonary embolism). These may include:  ? Sudden chest pain. ? Trouble breathing. ? Coughing up blood.    Call your doctor now or seek immediate medical care if:    · You have signs of a blood clot, such as:  ? Pain in your calf, back of the knee, thigh, or groin. ? Redness and swelling in your leg or groin.     · You have symptoms of infection, such as:  ? Increased pain, swelling, warmth, or redness. ? Red streaks or pus. ? A fever. Watch closely for changes in your health, and be sure to contact your doctor if:    · Your swelling is getting worse.     · You have new or worsening pain in your legs.     · You do not get better as expected. Where can you learn more? Go to [email or web address] and sign in to your [employer] (formerly known as [employer]) account. Enter C122 in the [employer] box to learn more about \"Leg and Ankle Edema: Care Instructions. \"     If you do not have an account, please click on the \"Sign Up Now\" link. Current as of: October 19, 2020               Content Version: 12.9  © 2006-2021 [employer], [facility]. Care instructions adapted under license by [facility] ([facility]). If you have questions about a medical condition or this instruction, always ask your healthcare professional. [place] [address] any warranty or liability for your use of this information.

## 2021-08-24 NOTE — TELEPHONE ENCOUNTER
Can you please re-send the Mobic. Pharmacy deleted script per Avery Chakraborty at [facility].

## 2021-08-25 ENCOUNTER — OFFICE VISIT (OUTPATIENT)
Dept: ORTHOPEDIC SURGERY | Age: 66
End: 2021-08-25
Payer: COMMERCIAL

## 2021-08-25 VITALS — BODY MASS INDEX: 25.49 KG/M2 | HEIGHT: 65 IN | WEIGHT: 153 LBS

## 2021-08-25 DIAGNOSIS — M25.561 RIGHT KNEE PAIN, UNSPECIFIED CHRONICITY: Primary | ICD-10-CM

## 2021-08-25 PROCEDURE — G8419 CALC BMI OUT NRM PARAM NOF/U: HCPCS | Performed by: ORTHOPAEDIC SURGERY

## 2021-08-25 PROCEDURE — G8427 DOCREV CUR MEDS BY ELIG CLIN: HCPCS | Performed by: ORTHOPAEDIC SURGERY

## 2021-08-25 PROCEDURE — 3017F COLORECTAL CA SCREEN DOC REV: CPT | Performed by: ORTHOPAEDIC SURGERY

## 2021-08-25 PROCEDURE — 99203 OFFICE O/P NEW LOW 30 MIN: CPT | Performed by: ORTHOPAEDIC SURGERY

## 2021-08-25 PROCEDURE — 4004F PT TOBACCO SCREEN RCVD TLK: CPT | Performed by: ORTHOPAEDIC SURGERY

## 2021-08-25 NOTE — PROGRESS NOTES
hard at all   Food Insecurity: No Food Insecurity    Worried About [facility] in the Last Year: Never true    Judy of Food in the Last Year: Never true   Transportation Needs:     Lack of Transportation (Medical):      Lack of Transportation (Non-Medical):    Physical Activity:     Days of Exercise per Week:     Minutes of Exercise per Session:    Stress:     Feeling of Stress :    Social Connections:     Frequency of Communication with Friends and Family:     Frequency of Social Gatherings with Friends and Family:     Attends Christianity Services:     Active Member of Clubs or Organizations:     Attends Club or Organization Meetings:     Marital Status:    Intimate Partner Violence:     Fear of Current or Ex-Partner:     Emotionally Abused:     Physically Abused:     Sexually Abused:      Social History     Occupational History    Not on file   Tobacco Use    Smoking status: Current Every Day Smoker     Packs/day: 0.50     Years: 20.00     Pack years: 10.00     Types: Cigarettes     Start date: 7/8/1999    Smokeless tobacco: Never Used   Substance and Sexual Activity    Alcohol use: No    Drug use: No    Sexual activity: Yes     Partners: Male       CURRENT MEDICATIONS     Current Outpatient Medications:     meloxicam (MOBIC) 7.5 MG tablet, Take 1 tablet by mouth daily as needed for Pain, Disp: 30 tablet, Rfl: 5    cyclobenzaprine (FLEXERIL) 5 MG tablet, Take 1 tablet by mouth nightly as needed for Muscle spasms, Disp: 30 tablet, Rfl: 0    fluticasone (FLONASE) 50 MCG/ACT nasal spray, 2 sprays by Nasal route daily, Disp: 1 Bottle, Rfl: 1    allopurinol (ZYLOPRIM) 100 MG tablet, TAKE 1 TABLET TWO (2) TIMES A DAY., Disp: 60 tablet, Rfl: 5    Elastic Bandages & Supports (KNEE BRACE) MISC, Soft neutral position right knee brace Size to fit Dx:  Knee pain, Disp: 1 each, Rfl: 0    lisinopril (PRINIVIL;ZESTRIL) 20 MG tablet, Take 1 tablet by mouth daily, Disp: 30 tablet, Rfl: 5   budesonide-formoterol (SYMBICORT) 160-4.5 MCG/ACT AERO, Inhale 2 puffs into the lungs 2 times daily, Disp: 1 Inhaler, Rfl: 5    albuterol sulfate  (90 Base) MCG/ACT inhaler, Inhale 2 puffs into the lungs every 4 hours as needed for Wheezing, Disp: 18 g, Rfl: 5    montelukast (SINGULAIR) 10 MG tablet, Take 1 tablet by mouth nightly, Disp: 30 tablet, Rfl: 5    nystatin (MYCOSTATIN) 305342 UNIT/GM cream, Apply topically 2 times daily. , Disp: 15 g, Rfl: 0    ASPIRIN 81 81 MG EC tablet, Take 1 tablet by mouth daily, Disp: 30 tablet, Rfl: 5    atenolol (TENORMIN) 50 MG tablet, TAKE ONE (1) TABLET DAILY. , Disp: 30 tablet, Rfl: 10    atorvastatin (LIPITOR) 40 MG tablet, TAKE ONE (1) TABLET DAILY. , Disp: 30 tablet, Rfl: 10    albuterol (PROVENTIL) (2.5 MG/3ML) 0.083% nebulizer solution, INHALE 1 VIAL VIA NEBULIZER EVERY 4 HOURS AS NEEDED FOR WHEEZING, Disp: 360 mL, Rfl: 10    ALLERGIES  Allergies   Allergen Reactions    Pcn [Penicillins]        Controlled Substances Monitoring:          REVIEW OF SYSTEMS:     Constitutional:  Negative for weight loss, fevers, chills, fatigue  Cardiovascular: Negative for chest pain, palpitations  Pulmonary: Negative for shortness of breath, labored breathing, cough  GI: negative for abdominal pain, nausea, vomitting   MSK: per HPI  Skin: negative for rash, open wounds    All other systems reviewed and are negative         PHYSICAL EXAM     Vitals:    08/25/21 1348   Weight: 153 lb (69.4 kg)   Height: 5' 5\" (1.651 m)       Height: 5' 5\" (1.651 m)  Weight: [unfilled]  BMI:  Body mass index is 25.46 kg/m². General: The patient is alert and oriented x 3, appears to be stated age and in no distress. HEENT: head is normocephalic, atraumatic. EOMI. Neck: supple, trachea midline, no thyromegaly   Cardiovascular: peripheral pulses palpable.   Normal Capillary refill   Respiratory: breathing unlabored, chest expansion symmetric   Skin: no rash, no open wounds, no erythema  Psych: normal affect; mood stable  Neurologic: gait normal, sensation grossly intact in extremities  MSK:        Lower Extremity:   Ipsilateral hip exam shows normal range of motion without pain with impingement testing. Exam of the right knee shows medial joint line tenderness. There is trace effusion today. Range of motion is full. Knee is stable. Lachman and posterior drawer stable. Patella tracks midline. There is mild patellar apprehension but no crepitus           IMAGING:    XR: 4 views of the right knee show grossly maintained joint space    Impression: Good alignment of right knee    MRI the right knee was reviewed. This is essentially benign except for a very small area of subchondral edema in the anterior medial plateau with likely some overlying chondromalacia      ASSESSMENT  Right knee pain, mild chondromalacia of medial compartment    PLAN  We discussed her knee today. Pain has been present for about 4 to 5 weeks, came on insidiously. She has had no treatment. I recommend a course of physical therapy. She has already started therapy and I recommend she continue. Recommend anti-inflammatories and other modalities as needed for pain. If she is not improved we will consider a steroid injection in her knee.   Follow-up in 6 weeks        Dallin Musa MD  Orthopaedic Surgery   8/25/21  1:49 PM

## 2021-08-31 ENCOUNTER — HOSPITAL ENCOUNTER (OUTPATIENT)
Dept: PHYSICAL THERAPY | Age: 66
Setting detail: THERAPIES SERIES
Discharge: HOME | End: 2021-08-31
Payer: COMMERCIAL

## 2021-08-31 PROCEDURE — 97161 PT EVAL LOW COMPLEX 20 MIN: CPT

## 2021-08-31 NOTE — PROGRESS NOTES
[facility]                Phone: [phone]   Fax: [phone]    Physical Therapy Initial Evaluation  Date:  2021    Patient Name:  Jeffry Bell    :  1955  MRN: [id number]    Referring Physician:  Charlene Womack MD  Insurance Information:  [employer]     Evaluation date:  2021  Diagnosis:  R knee pain  ICD-10 Codes:  M25.561  Evaluating Physical Therapist:  Shanice Day, PT, DPT      Subjective:    Pain:  7/10     Location:  Inferomedial patella    Description:  Sarah Pennant    Edema:  Noted mild-moderate edema through pt's R lower leg. Sensation:  denies numbness/tingling to all extremities    History/Onset of Symptoms:  Pt stated she started having R knee pain about 5 weeks ago. Per pt, she was given a steroid for an infection and after taking her dosage for 1 day, she started having significant knee pain. Pt saw Dr. Marcy Hoover, who recommended PT. Pt was in therapy at an outside facility but she stated she felt worse after going there; pt went for 3 visits and then stopped going. Pt has difficulty with steps due to the pain. Pt lives in a 2 story home with bed/bath on second floor but she has been staying on the first floor due to the pain. Pt works as a HHA 7 days/week (light housework only). Pt stated that within the last week, her R knee has started buckling periodically as well; pt denies any falls due to the buckling. Pt has a knee brace that she wears at night and uses an Ace wrap during the day. Objective:    PROM Deficits:  R knee ROM with passive overpressure WFL bilaterally; no c/o pain with ROM testing    AROM Deficits:  AROM B LE's WFL     Palpation:  Noted TTP over R knee medial joint line     Strength Deficits:  R hip 4-/5, knee 4-/5 due to pain, ankle 4/5.   L hip 4/5, knee and ankle 4+/5    Special Tests:       Anterior/Posterior Drawer tests (-)     Varus/Valgus Stress tests (-)     Nicolette's test (-)     Thessaly's test (-)     Hamstring flexibility - L LE just under 90*; R LE <90* (moderate tightness)     Jer test - L LE min-mod movement loss; R LE severe movement loss     Emerson test (modified) - L LE min movement loss; R LE mod movement loss     Gait:  Pt ambulated throughout PT gym without AD independently. Pt demonstrated slow gait speed and antalgic gait pattern. Steps:  Pt ascended/descended 4 steps with 1 HR using step-to pattern. Additional Comments:  After evaluation was completed, PT used Ace wrap for compression from pt's R foot up to just above her knee and elevated R LE on a stool (pt was supine) for 5 minutes to aide in decreasing R LE swelling. Pt educated on elevating R LE at home, especially after work, with or without Ace wrap and on ankle pumps to promote fluid movement. Pt verbalized understanding. Pt requested PT use Ace wrap on her knee before she left for support while walking. PT wrapped R knee in Ace wrap and educated pt on removing Ace wrap should R LE edema increase or if she noticed fluid beginning to pocket below the Ace wrap; pt verbalized understanding. Summary/Assessment:    Pt presents to outpatient physical therapy with c/o R knee pain of insidious onset. Pt's R knee ROM is WFL and all special tests are negative. Noted tightness in R hip musculature that could be contributing to pt's pain. Pt would benefit from further PT to reduce edema and decrease pain in order for pt to return to independent prior level of function.         Plan:     Below checked are areas for improvement during physical therapy POC:        [x]  Pain reduction  []  Balance Improvement       [x]  Strengthening  []  Postural Improvement   []  Range of Motion  [x]  Soft Tissue Improvement    []  Gait Training   [x]  Other: Edema Control     [x]  Home Exercise Program      Pt will be see for 1-2 visits per week for 6-8 weeks for a total of 6-10 visits to accomplish goals set below: Short/Long Term Goals: (6-8 weeks)      1. Pt will report decreased R knee pain to 0-2/10 with activity. 2.  Pt will increase R LE strength to at least 4/5 to 4+/5 throughout. 3.  Pt will ambulated without AD independently with normal gait mechanics. 4.  Pt will ascend/descend 12 steps with 1 HR modified independent using reciprocal pattern. 5.  Pt will be independent with HEP. Pt's potential for reaching Physical Therapy goals: good.     CPT codes 8/31/2021 Units  Minutes   Low Complexity PT evaluation  31901 1    Moderate Complexity PT evaluation  24655     High Complexity PT evaluation [id number]     PT Re-evaluation  [id number]     Gait training [id number]     Manual therapy  86246     Therapeutic activities  89748     Therapeutic exercises  25584     Neuromuscular reeducation  49418           Time In:  1310  Time Out:  [address], PT, DPT

## 2021-08-31 NOTE — PROGRESS NOTES
[facility]                Phone: [phone]   Fax: [phone]    Physical Therapy Daily Treatment Note  Date:  2021    Patient Name:  Branden Cartwright    :  1955  MRN: [id number]    Referring Physician:  Ali Hamman, MD  Insurance Information:  [facility]      Evaluation date:  2021  Diagnosis:  R knee pain  ICD-10 Codes:  M25.561  Evaluating Physical Therapist:  Davon Soliz PT, DPT      Visit# / total visits:  -10     Time In:    Time Out:      Subjective:      Exercises:   Exercise/Equipment Resistance/Repetitions Other comments      Bike                 Jer stretch        Hamstring 3-way stretch        Hip flexor/quad stretch                Manual distraction                                                                                                            Assessment/Comments:      Home Exercise Program:        Treatment/Activity Tolerance:  [] Patient tolerated treatment well [] Patient limited by fatigue  [] Patient limited by pain  [] Patient limited by other medical complications  [] Other:     Prognosis: [x] Good [] Fair  [] Poor    Patient Requires Follow-up: [] Yes  [] No    Plan:   [] Continue per plan of care [] Alter current plan (see comments)  [x] Plan of care initiated [] Hold pending MD visit [] Discharge    Plan for Next Session:  Begin R hip stretching/mobility exercises and manual distraction PRN. Progress to strengthening and functional activities as able.     See Progress Note: []  Yes  [x]  No         CPT codes 2021 Units  Minutes   Low Complexity PT evaluation  88848     Moderate Complexity PT evaluation  34567     High Complexity PT evaluation [id number]     PT Re-evaluation  [id number]     Gait training [id number]     Manual therapy  05034     Therapeutic activities  15936     Therapeutic exercises  04592     Neuromuscular reeducation  12202         Electronically signed by:  Davon Soliz PT, DPT

## 2021-09-07 DIAGNOSIS — J45.20 MILD INTERMITTENT ASTHMA WITHOUT COMPLICATION: ICD-10-CM

## 2021-09-07 DIAGNOSIS — I10 ESSENTIAL HYPERTENSION: ICD-10-CM

## 2021-09-07 DIAGNOSIS — M1A.00X0 IDIOPATHIC CHRONIC GOUT WITHOUT TOPHUS, UNSPECIFIED SITE: ICD-10-CM

## 2021-09-07 DIAGNOSIS — E78.00 PURE HYPERCHOLESTEROLEMIA: ICD-10-CM

## 2021-09-07 RX ORDER — ATENOLOL 50 MG/1
TABLET ORAL
Qty: 30 TABLET | Refills: 1 | Status: SHIPPED
Start: 2021-09-07 | End: 2021-12-03

## 2021-09-07 RX ORDER — ATORVASTATIN CALCIUM 40 MG/1
TABLET, FILM COATED ORAL
Qty: 30 TABLET | Refills: 1 | Status: SHIPPED
Start: 2021-09-07 | End: 2021-12-03

## 2021-09-07 RX ORDER — ASPIRIN 81 MG/1
81 TABLET, COATED ORAL DAILY
Qty: 30 TABLET | Refills: 1 | Status: SHIPPED
Start: 2021-09-07 | End: 2022-05-24

## 2021-09-07 RX ORDER — ALLOPURINOL 100 MG/1
TABLET ORAL
Qty: 60 TABLET | Refills: 1 | Status: SHIPPED
Start: 2021-09-07 | End: 2022-01-31 | Stop reason: SDUPTHER

## 2021-09-07 RX ORDER — MONTELUKAST SODIUM 10 MG/1
10 TABLET ORAL NIGHTLY
Qty: 30 TABLET | Refills: 1 | Status: ON HOLD
Start: 2021-09-07 | End: 2022-05-26 | Stop reason: HOSPADM

## 2021-09-07 NOTE — TELEPHONE ENCOUNTER
----- Message from Ethan Eduardo sent at 9/4/2021 12:25 PM EDT -----  Subject: Refill Request    QUESTIONS  Name of Medication? atenolol (TENORMIN) 50 MG tablet  Patient-reported dosage and instructions? 50 mg once a day  How many days do you have left? 0  Preferred Pharmacy? [facility] [place] [address]. Pharmacy phone number (if available)? [phone]  ---------------------------------------------------------------------------  --------------  CALL BACK INFO  What is the best way for the office to contact you? OK to leave message on   voicemail  Preferred Call Back Phone Number?  [phone]

## 2021-09-07 NOTE — TELEPHONE ENCOUNTER
----- Message from Noelle Lennox sent at 9/4/2021 12:27 PM EDT -----  Subject: Refill Request    QUESTIONS  Name of Medication? atorvastatin (LIPITOR) 40 MG tablet  Patient-reported dosage and instructions? 20 mg once a day. How many days do you have left? 0  Preferred Pharmacy? [facility] [address] [place] [address]. Pharmacy phone number (if available)? [phone]  ---------------------------------------------------------------------------  --------------  CALL BACK INFO  What is the best way for the office to contact you? OK to leave message on   voicemail  Preferred Call Back Phone Number?  [phone]

## 2021-09-07 NOTE — TELEPHONE ENCOUNTER
----- Message from Herlinda Lester sent at 9/4/2021 12:32 PM EDT -----  Subject: Refill Request    QUESTIONS  Name of Medication? ASPIRIN 81 81 MG EC tablet  Patient-reported dosage and instructions? 81 mg once a day  How many days do you have left? 0  Preferred Pharmacy? [facility] [address] [place] [address]. Pharmacy phone number (if available)? [phone]  ---------------------------------------------------------------------------  --------------  CALL BACK INFO  What is the best way for the office to contact you? OK to leave message on   voicemail  Preferred Call Back Phone Number?  [phone]

## 2021-09-07 NOTE — TELEPHONE ENCOUNTER
----- Message from Lolly Johnson sent at 9/4/2021 12:31 PM EDT -----  Subject: Refill Request    QUESTIONS  Name of Medication? allopurinol (ZYLOPRIM) 100 MG tablet  Patient-reported dosage and instructions? 100 mg twice day  How many days do you have left? 1  Preferred Pharmacy? [facility] [address] [place] [address]. Pharmacy phone number (if available)? [phone]  ---------------------------------------------------------------------------  --------------  CALL BACK INFO  What is the best way for the office to contact you? OK to leave message on   voicemail  Preferred Call Back Phone Number?  [phone]

## 2021-09-28 ENCOUNTER — HOSPITAL ENCOUNTER (OUTPATIENT)
Dept: PHYSICAL THERAPY | Age: 66
Setting detail: THERAPIES SERIES
Discharge: HOME | End: 2021-09-28
Payer: COMMERCIAL

## 2021-09-28 NOTE — DISCHARGE SUMMARY
. [address]   Phone: [phone] Fax: [phone]      Outpatient Physical Therapy  Non compliance/Attendance Issue          Date:  9/28/2021    Referring Marisa Baker, [address]  Plan      Evaluation date:  8/31/2021  Diagnosis:  R knee pain  ICD-10 Codes:  M25.561  Evaluating Physical Therapist:  Jaclyn Charmayne Crass, PT, DPT     Total visits to date:  Initial evaluation  Cancels/No Shows to date:  NA      Dear Dr. Dnoita Feliciano,      This is to inform you that, as per Evan0 Dedra Fu, your patient Ede Jackson is, as of todays date, being discharged from Physical Therapy secondary to the following reason(s):    Pt was seen in this clinic for her initial evaluation on 8/31/2021. Pt stated she would check her work scheduled and call back to schedule PT sessions. Pt has not contacted this office as of today's date to resume PT. If you have any questions, please feel free to call at [phone].       Thank you,    Omar Dolan, PT, DPT    [address] [address]   [phone]

## 2021-09-29 NOTE — PROGRESS NOTES
[facility] [address]  [facility]  Visit Note      Patient name: Araceli Patient    Reason for consult: AS      Primary Care Physician: Parisa Camargo PA-C    Date of service: 9/29/2021    Chief Complaint: AS    HPI: Patient is a 60 yo female who presents to valve clinic for continued evaluation and recommendations regarding her AS. Patient with long hx of knee issues, was seen by her PCP last month for right leg swelling, US was ordered and DVT ruled out, secondary to the edema, an echo was ordered as well for evaluation and which revealed EF 50-55%, severe AS with valve area of 0.6, and moderate AI, mild MR   Patient denies any CP, palpations, ALMEIDA, or syncope. She does admit she has been limited in activity secondary to her knee issues. Allergies: Allergies   Allergen Reactions    Pcn [Penicillins]        Home medications:    Current Outpatient Medications   Medication Sig Dispense Refill    montelukast (SINGULAIR) 10 MG tablet Take 1 tablet by mouth nightly 30 tablet 1    ASPIRIN 81 81 MG EC tablet Take 1 tablet by mouth daily 30 tablet 1    allopurinol (ZYLOPRIM) 100 MG tablet TAKE 1 TABLET TWO (2) TIMES A DAY. 60 tablet 1    atorvastatin (LIPITOR) 40 MG tablet TAKE ONE (1) TABLET DAILY. 30 tablet 1    atenolol (TENORMIN) 50 MG tablet TAKE ONE (1) TABLET DAILY.  30 tablet 1    meloxicam (MOBIC) 7.5 MG tablet Take 1 tablet by mouth daily as needed for Pain 30 tablet 5    cyclobenzaprine (FLEXERIL) 5 MG tablet Take 1 tablet by mouth nightly as needed for Muscle spasms 30 tablet 0    fluticasone (FLONASE) 50 MCG/ACT nasal spray 2 sprays by Nasal route daily 1 Bottle 1    Elastic Bandages & Supports (KNEE BRACE) MISC Soft neutral position right knee brace  Size to fit  Dx:  Knee pain 1 each 0    lisinopril (PRINIVIL;ZESTRIL) 20 MG tablet Take 1 tablet by mouth daily 30 tablet 5    budesonide-formoterol (SYMBICORT) 160-4.5 MCG/ACT AERO Inhale 2 puffs into the lungs 2 times daily 1 Inhaler 5    albuterol sulfate  (90 Base) MCG/ACT inhaler Inhale 2 puffs into the lungs every 4 hours as needed for Wheezing 18 g 5    nystatin (MYCOSTATIN) 918801 UNIT/GM cream Apply topically 2 times daily. 15 g 0    albuterol (PROVENTIL) (2.5 MG/3ML) 0.083% nebulizer solution INHALE 1 VIAL VIA NEBULIZER EVERY 4 HOURS AS NEEDED FOR WHEEZING 360 mL 10     No current facility-administered medications for this visit. 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    TONSILLECTOMY      TUBAL LIGATION         Social History:  Social History     Socioeconomic History    Marital status:      Spouse name: Not on file    Number of children: Not on file    Years of education: Not on file    Highest education level: Not on file   Occupational History    Not on file   Tobacco Use    Smoking status: Current Every Day Smoker     Packs/day: 0.50     Years: 20.00     Pack years: 10.00     Types: Cigarettes     Start date: 7/8/1999    Smokeless tobacco: Never Used   Substance and Sexual Activity    Alcohol use: No    Drug use: No    Sexual activity: Yes     Partners: Male   Other Topics Concern    Not on file   Social History Narrative    Not on file     Social Determinants of Health     Financial Resource Strain: Low Risk     Difficulty of Paying Living Expenses: Not hard at all   Food Insecurity: No Food Insecurity    Worried About [facility] in the Last Year: Never true    [facility] in the Last Year: Never true   Transportation Needs:     Lack of Transportation (Medical):      Lack of Transportation (Non-Medical):    Physical Activity:     Days of Exercise per Week:     Minutes of Exercise per Session:    Stress:     Feeling of Stress :    Social Connections:     Frequency of Communication with Friends Hypertension    Mild intermittent asthma without complication    Pure hypercholesterolemia    Tobacco abuse       60 yo female with critical AS         Plan:   Given her young age and low STS risk, she is likely best served with surgery  Continue workup for risk stratification   [facility]  Follow up to schedule SAVR

## 2021-09-30 ENCOUNTER — OFFICE VISIT (OUTPATIENT)
Dept: CARDIOTHORACIC SURGERY | Age: 66
End: 2021-09-30
Payer: COMMERCIAL

## 2021-09-30 ENCOUNTER — OFFICE VISIT (OUTPATIENT)
Dept: CARDIOLOGY CLINIC | Age: 66
End: 2021-09-30
Payer: COMMERCIAL

## 2021-09-30 VITALS
SYSTOLIC BLOOD PRESSURE: 190 MMHG | BODY MASS INDEX: 24.99 KG/M2 | DIASTOLIC BLOOD PRESSURE: 74 MMHG | HEIGHT: 65 IN | HEART RATE: 76 BPM | WEIGHT: 150 LBS | RESPIRATION RATE: 20 BRPM

## 2021-09-30 DIAGNOSIS — I35.0 SEVERE AORTIC STENOSIS: Primary | ICD-10-CM

## 2021-09-30 DIAGNOSIS — I35.1 MODERATE TO SEVERE AORTIC INSUFFICIENCY: ICD-10-CM

## 2021-09-30 PROCEDURE — 99205 OFFICE O/P NEW HI 60 MIN: CPT | Performed by: INTERNAL MEDICINE

## 2021-09-30 PROCEDURE — G8428 CUR MEDS NOT DOCUMENT: HCPCS | Performed by: THORACIC SURGERY (CARDIOTHORACIC VASCULAR SURGERY)

## 2021-09-30 PROCEDURE — 3017F COLORECTAL CA SCREEN DOC REV: CPT | Performed by: INTERNAL MEDICINE

## 2021-09-30 PROCEDURE — G8420 CALC BMI NORM PARAMETERS: HCPCS | Performed by: THORACIC SURGERY (CARDIOTHORACIC VASCULAR SURGERY)

## 2021-09-30 PROCEDURE — 99204 OFFICE O/P NEW MOD 45 MIN: CPT | Performed by: THORACIC SURGERY (CARDIOTHORACIC VASCULAR SURGERY)

## 2021-09-30 PROCEDURE — 3017F COLORECTAL CA SCREEN DOC REV: CPT | Performed by: THORACIC SURGERY (CARDIOTHORACIC VASCULAR SURGERY)

## 2021-09-30 PROCEDURE — 4004F PT TOBACCO SCREEN RCVD TLK: CPT | Performed by: THORACIC SURGERY (CARDIOTHORACIC VASCULAR SURGERY)

## 2021-09-30 PROCEDURE — 4004F PT TOBACCO SCREEN RCVD TLK: CPT | Performed by: INTERNAL MEDICINE

## 2021-09-30 PROCEDURE — G8420 CALC BMI NORM PARAMETERS: HCPCS | Performed by: INTERNAL MEDICINE

## 2021-09-30 PROCEDURE — G8427 DOCREV CUR MEDS BY ELIG CLIN: HCPCS | Performed by: INTERNAL MEDICINE

## 2021-09-30 NOTE — PATIENT INSTRUCTIONS
The office will be in touch to schedule your heart catheterization    Please see your dentist for clearance prior to heart valve surgery - they can fax a note to [phone]    Further recommendations/follow up pending above results

## 2021-09-30 NOTE — PROGRESS NOTES
[facility]        Patient name: Ryan Charles    Reason for consult: AS    Referring Physician: Donta Abraham PA-C     Primary Care Physician: Donta Abraham PA-C    Date of service: 9/30/2021    Chief Complaint: ALMEIDA    HPI:     Pleasant 30-year-old -American female who presents as a new patient    She has dyspnea on exertion on going up 2-3 flights of stairs but not less. She has no chest pain or lower extremity edema orthopnea or PND. When asked if she noticed any functional decline over the past 1 to 2 years she says that it is difficult given her asthma. She has no palpitations or syncope or presyncope. A focused history review includes:   1. Hypertension  2. Active smoker  3. Asthma      Allergies: Allergies   Allergen Reactions    Pcn [Penicillins]        Home medications:    Current Outpatient Medications   Medication Sig Dispense Refill    montelukast (SINGULAIR) 10 MG tablet Take 1 tablet by mouth nightly 30 tablet 1    ASPIRIN 81 81 MG EC tablet Take 1 tablet by mouth daily 30 tablet 1    allopurinol (ZYLOPRIM) 100 MG tablet TAKE 1 TABLET TWO (2) TIMES A DAY. 60 tablet 1    atorvastatin (LIPITOR) 40 MG tablet TAKE ONE (1) TABLET DAILY. 30 tablet 1    atenolol (TENORMIN) 50 MG tablet TAKE ONE (1) TABLET DAILY.  30 tablet 1    meloxicam (MOBIC) 7.5 MG tablet Take 1 tablet by mouth daily as needed for Pain 30 tablet 5    cyclobenzaprine (FLEXERIL) 5 MG tablet Take 1 tablet by mouth nightly as needed for Muscle spasms 30 tablet 0    fluticasone (FLONASE) 50 MCG/ACT nasal spray 2 sprays by Nasal route daily 1 Bottle 1    Elastic Bandages & Supports (KNEE BRACE) MISC Soft neutral position right knee brace  Size to fit  Dx:  Knee pain 1 each 0    lisinopril (PRINIVIL;ZESTRIL) 20 MG tablet Take 1 tablet by mouth daily 30 tablet 5    budesonide-formoterol (SYMBICORT) 160-4.5 MCG/ACT AERO Inhale 2 puffs into the lungs 2 times daily 1 Inhaler 5  albuterol sulfate  (90 Base) MCG/ACT inhaler Inhale 2 puffs into the lungs every 4 hours as needed for Wheezing 18 g 5    nystatin (MYCOSTATIN) 630227 UNIT/GM cream Apply topically 2 times daily. 15 g 0    albuterol (PROVENTIL) (2.5 MG/3ML) 0.083% nebulizer solution INHALE 1 VIAL VIA NEBULIZER EVERY 4 HOURS AS NEEDED FOR WHEEZING 360 mL 10     No current facility-administered medications for this visit. 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    TONSILLECTOMY      TUBAL LIGATION         Social History:  Social History     Socioeconomic History    Marital status:      Spouse name: Not on file    Number of children: Not on file    Years of education: Not on file    Highest education level: Not on file   Occupational History    Not on file   Tobacco Use    Smoking status: Current Every Day Smoker     Packs/day: 0.50     Years: 20.00     Pack years: 10.00     Types: Cigarettes     Start date: 7/8/1999    Smokeless tobacco: Never Used   Substance and Sexual Activity    Alcohol use: No    Drug use: No    Sexual activity: Yes     Partners: Male   Other Topics Concern    Not on file   Social History Narrative    Not on file     Social Determinants of Health     Financial Resource Strain: Low Risk     Difficulty of Paying Living Expenses: Not hard at all   Food Insecurity: No Food Insecurity    Worried About [address] in the Last Year: Never true    [facility] in the Last Year: Never true   Transportation Needs:     Lack of Transportation (Medical):      Lack of Transportation (Non-Medical):    Physical Activity:     Days of Exercise per Week:     Minutes of Exercise per Session:    Stress:     Feeling of Stress :    Social Connections:     Frequency of Communication with Friends and Family:     Frequency of Social Gatherings with Friends and Family:     Attends Jain Services:     Active Member of Clubs or Organizations:     Attends Club or Organization Meetings:     Marital Status:    Intimate Partner Violence:     Fear of Current or Ex-Partner:     Emotionally Abused:     Physically Abused:     Sexually Abused:        Family History:  Family History   Problem Relation Age of Onset    Diabetes Mother     High Blood Pressure Mother     Heart Disease Father        Review of Systems:  Constitutional: Denies fevers, chills, or weight loss. HEENT: Denies visual changes or hearing loss. Heart: As per HPI. Lungs: Denies shortness of breath, cough, or wheezing. Gastrointestinal: Denies nausea, vomiting, constipation, or diarrhea. Genitourinary: Denies dysuria or hematuria. Psychiatric: Patient denies anxiety or depression. Neurologic: Patient denies weakness of the extremities, dizziness, or headaches. All other ROS checked and found to be negative. Objective:  Vitals BP (!) 190/74 (Site: Left Upper Arm, Position: Sitting)   Pulse 76   Resp 20   Ht 5' 5\" (1.651 m)   Wt 150 lb (68 kg)   BMI 24.96 kg/m²   Body surface area is 1.77 meters squared. General Appearance: Pleasant 61y.o. year old female who appears stated age. Communicates well, no acute distress. HEENT: Head is normocephalic, atraumatic. EOMs intact, PERRL. Trachea midline. Lungs: Normal respiratory rate and normal effort. She is not in respiratory distress. Breath sounds clear to auscultation. No wheezes. Heart: Normal rate. Regular rhythm. S1 normal.  3/6 systolic ejection murmur, late peaking, with very diminished S2. Chest: Symmetric chest wall expansion. Extremities: Normal range of motion. Neurological: Patient is alert and oriented to person, place and time. Patient has normal reflexes. Skin: Warm and dry. Abdomen: Abdomen is soft and non-distended.  Bowel sounds are normal. There is no abdominal tenderness tenderness. There is no guarding. There is no mass. Pulses: Distal pulses are intact. Skin: Warm and dry without lesions. Lab Results   Component Value Date     01/12/2021    K 4.0 01/12/2021     01/12/2021    CO2 24 01/12/2021    BUN 19 01/12/2021    CREATININE 1.2 (H) 01/12/2021    GLUCOSE 85 01/12/2021    CALCIUM 9.6 01/12/2021    PROT 7.0 01/12/2021    LABALBU 3.9 01/12/2021    BILITOT 0.3 01/12/2021    ALKPHOS 70 01/12/2021    AST 15 01/12/2021    ALT 20 01/12/2021    LABGLOM 55 01/12/2021    GFRAA 55 01/12/2021       Lab Results   Component Value Date    WBC 8.4 01/12/2021    HGB 12.6 01/12/2021    HCT 39.9 01/12/2021    .5 (H) 01/12/2021     01/12/2021       EKG 7/15/21:  Sinus rhythm, LVH with strain. Normal conduction    TTE 8/11/21:  Normal LV size with low normal to mildly reduced LVEF  Heavily calcified and severely restricted aortic valve with severe aortic stenosis (V-max 4.1, mean gradient 34) and moderate to severe aortic insufficiency  Mild to moderate MR  Normal RV size and systolic function  No significant TR    Assessment:   81-year-old -American female with mixed severe AS/AR and early LV remodeling. Her symptomatic state is ambiguous but LV remodeling is clear and indicates AVR. Given her young age we will work her up for SAVR+/-CABG with TAVR as a second line option. The main possible surgical risk will relate to her heavy smoking history    NYHA class 2    Plan:   · Coronary angiogram and MICHAEL  · Rest of surgical work-up  · Proceed to SAVR versus TAVR afterwards as above  · She will continue her longitudinal cardiac care with me    Seen and discussed in association with Dr. Kelsea Snider MD of cardiothoracic surgery.     Richard Soto MD, [facility]  Interventional Cardiology/Structural Heart Disease  Office: [phone]  Structural Heart Coordinator: Allena Hug, PA-C    Electronically signed by Richard Soto MD on 9/30/2021 at 3:00 PM

## 2021-10-05 ENCOUNTER — OFFICE VISIT (OUTPATIENT)
Dept: FAMILY MEDICINE CLINIC | Age: 66
End: 2021-10-05
Payer: COMMERCIAL

## 2021-10-05 VITALS
SYSTOLIC BLOOD PRESSURE: 130 MMHG | WEIGHT: 148 LBS | HEART RATE: 76 BPM | BODY MASS INDEX: 24.66 KG/M2 | TEMPERATURE: 97.9 F | RESPIRATION RATE: 16 BRPM | HEIGHT: 65 IN | DIASTOLIC BLOOD PRESSURE: 68 MMHG

## 2021-10-05 DIAGNOSIS — F17.200 SMOKER: ICD-10-CM

## 2021-10-05 DIAGNOSIS — I10 PRIMARY HYPERTENSION: ICD-10-CM

## 2021-10-05 DIAGNOSIS — I35.0 SEVERE AORTIC STENOSIS: Primary | ICD-10-CM

## 2021-10-05 DIAGNOSIS — I35.0 NON-RHEUMATIC AORTIC STENOSIS: ICD-10-CM

## 2021-10-05 DIAGNOSIS — Z01.810 PREOPERATIVE CARDIOVASCULAR EXAMINATION: Primary | ICD-10-CM

## 2021-10-05 DIAGNOSIS — J45.20 MILD INTERMITTENT ASTHMA WITHOUT COMPLICATION: ICD-10-CM

## 2021-10-05 PROCEDURE — G8484 FLU IMMUNIZE NO ADMIN: HCPCS | Performed by: PHYSICIAN ASSISTANT

## 2021-10-05 PROCEDURE — G8420 CALC BMI NORM PARAMETERS: HCPCS | Performed by: PHYSICIAN ASSISTANT

## 2021-10-05 PROCEDURE — 3017F COLORECTAL CA SCREEN DOC REV: CPT | Performed by: PHYSICIAN ASSISTANT

## 2021-10-05 PROCEDURE — 4004F PT TOBACCO SCREEN RCVD TLK: CPT | Performed by: PHYSICIAN ASSISTANT

## 2021-10-05 PROCEDURE — 99214 OFFICE O/P EST MOD 30 MIN: CPT | Performed by: PHYSICIAN ASSISTANT

## 2021-10-05 PROCEDURE — G8427 DOCREV CUR MEDS BY ELIG CLIN: HCPCS | Performed by: PHYSICIAN ASSISTANT

## 2021-10-05 RX ORDER — NICOTINE 21 MG/24HR
1 PATCH, TRANSDERMAL 24 HOURS TRANSDERMAL DAILY
Qty: 42 PATCH | Refills: 0 | Status: SHIPPED
Start: 2021-10-05 | End: 2022-03-02

## 2021-10-05 NOTE — PROGRESS NOTES
Chief Complaint   Patient presents with    Other     quit smoking       HPI:  Patient is here for follow-up of tobacco abuse. She was strongly advised by CTS to quit smoking. She has done this in the past with the nicotine transdermal, and she would like to do this again. She has ALMEIDA, but no CP. She is scheduled for a cath next week to prepare for valve replacement surgery and concominant cabg if needed. She attributes most breathing sx to her asthma, and believes this to be stable. She smokes 1 ppd. Patient's past medical, surgical, social and/or family history reviewed, updated in chart, and are non-contributory (unless otherwise stated). Medications and allergies also reviewed and updated in chart. Review of Systems:  Constitutional:  No fever, no fatigue, no chills, no headaches, no weight change  Dermatology:  No rash, no mole, no dry or sensitive skin  ENT:  No cough, no sore throat, no sinus pain, no runny nose, no ear pain  Cardiology:  No chest pain, no palpitations, no leg edema, +shortness of breath, no PND  Gastroenterology:  No dysphagia, no abdominal pain, no nausea, no vomiting, no constipation, no diarrhea, no heartburn  Musculoskeletal:  + joint pain, no leg cramps, no back pain, no muscle aches  Respiratory:  No shortness of breath, no orthopnea, no wheezing, no ALMEIDA, no hemoptysis  Urology:  No blood in the urine, no urinary frequency, no urinary incontinence, no urinary urgency, no nocturia, no dysuria    Vitals:    10/05/21 1256   BP: 130/68   Site: Right Upper Arm   Pulse: 76   Resp: 16   Temp: 97.9 °F (36.6 °C)   Weight: 148 lb (67.1 kg)   Height: 5' 5\" (1.651 m)       Physical Exam  Constitutional:       General: She is not in acute distress. Appearance: She is well-developed. HENT:      Head: Normocephalic and atraumatic. Right Ear: External ear normal.      Left Ear: External ear normal.      Nose: Nose normal.   Eyes:      General: No scleral icterus. Conjunctiva/sclera: Conjunctivae normal.      Pupils: Pupils are equal, round, and reactive to light. Neck:      Thyroid: No thyromegaly. Cardiovascular:      Rate and Rhythm: Normal rate and regular rhythm. Heart sounds: Murmur (3/6) heard. Pulmonary:      Effort: Pulmonary effort is normal. No accessory muscle usage or respiratory distress. Breath sounds: Normal breath sounds. No wheezing. Musculoskeletal:         General: Normal range of motion. Cervical back: Normal range of motion and neck supple. Right lower leg: No edema. Left lower leg: No edema. Skin:     General: Skin is warm and dry. Findings: No rash. Neurological:      Mental Status: She is alert and oriented to person, place, and time. Deep Tendon Reflexes: Reflexes are normal and symmetric. Psychiatric:         Speech: Speech normal.         Behavior: Behavior normal.         Assessment/Plan:      Leatha Sandifer was seen today for other. Diagnoses and all orders for this visit:    Severe aortic stenosis  · SAVR versus TAVR, cath scheduled 10/14    Smoker  -     nicotine (NICODERM CQ) 14 MG/24HR; Place 1 patch onto the skin daily    Primary hypertension  -stable    Mild intermittent asthma without complication  Stable      As above. Call or go to ED immediately if symptoms worsen or persist.  Return in about 4 weeks (around 11/2/2021). , or sooner if necessary. Educational materials and/or home exercises printed for patient's review and were included in patient instructions on his/her After Visit Summary and given to patient at the end of visit. Counseled regarding above diagnosis, including possible risks and complications,  especially if left uncontrolled. Counseled regarding the possible side effects, risks, benefits and alternatives to treatment; patient and/or guardian verbalizes understanding, agrees, feels comfortable with and wishes to proceed with above treatment plan.     Advised patient to call with any new medication issues, and read all Rx info from pharmacy to assure aware of all possible risks and side effects of medication before taking. Reviewed age and gender appropriate health screening exams and vaccinations. Advised patient regarding importance of keeping up with recommended health maintenance and to schedule as soon as possible if overdue, as this is important in assessing for undiagnosed pathology, especially cancer, as well as protecting against potentially harmful/life threatening disease. Patient and/or guardian verbalizes understanding and agrees with above counseling, assessment and plan. All questions answered. Parisa Camargo PA-C  10/5/2021    I have personally reviewed and updated the chief complaint, HPI, Past Medical, Family and Social History, as well as the above Review of Systems.

## 2021-10-12 ENCOUNTER — TELEPHONE (OUTPATIENT)
Dept: CARDIOLOGY CLINIC | Age: 66
End: 2021-10-12

## 2021-10-12 NOTE — TELEPHONE ENCOUNTER
Pt called stating she saw dentist today who would not clear her for upcoming procedure. She states she has another appt to see the dentist on 10/27/21. She was encouraged to keep us informed when she is cleared so that we can reschedule her. She verbalized understanding.

## 2021-11-15 NOTE — TELEPHONE ENCOUNTER
Has she gotten back in touch to schedule? If not, please give her a call and check status.  Thanks!!

## 2021-11-15 NOTE — TELEPHONE ENCOUNTER
I spoke with Keli Moreno. She states dentist will not yet sign off. States she has an appt in mid Jan.  She was again asked to reach out to us when she is cleared by him. She verbalized understanding.

## 2021-11-23 NOTE — TELEPHONE ENCOUNTER
Spoke to pt, she is currently out of town. She would appreciate a call on Mon 11/29/21 or Tues 11/30/21 to rediscuss what the need for the testing is again and what the tests entail.

## 2021-12-01 ENCOUNTER — TELEPHONE (OUTPATIENT)
Dept: CARDIOLOGY | Age: 66
End: 2021-12-01

## 2021-12-01 NOTE — PROGRESS NOTES
Spoke to patient and reviewed recommendation for coronary angiogram and transesophageal echo. Procedures and reasoning discussed. All questions answered. She states understanding and wishes to proceed. Will proceed to schedule and follow up with recommendations pending those results.

## 2021-12-17 NOTE — TELEPHONE ENCOUNTER
Leilani Mazariegos called back stating she will have to call back after the first of 2022. She states she must speak to her employer about time off and her office is closed until then.

## 2022-01-07 ENCOUNTER — TELEPHONE (OUTPATIENT)
Dept: CARDIOLOGY CLINIC | Age: 67
End: 2022-01-07

## 2022-01-27 ENCOUNTER — NURSE TRIAGE (OUTPATIENT)
Dept: OTHER | Facility: CLINIC | Age: 67
End: 2022-01-27

## 2022-01-27 NOTE — TELEPHONE ENCOUNTER
Received call from [facility] at [facility] with Red Flag Complaint. Subjective: Caller states \"It seems like I am gasping for air from 1800 on it starts and she has to do her inhaler. She waits 30 minutes and that helps. She is clogged up too. \"     Current Symptoms: SOB at night time, nasal congestion, runny nose, Vomiting (a few weeks ago), headache, cough and loss of smell. Onset: 3 weeks ago; sudden    Associated Symptoms: NA    Pain Severity: 5/10; nagging; constant    Temperature: Patient denies n/a    What has been tried: Charleen Cruz started a few days ago taking that    LMP: NA Pregnant: NA    Recommended disposition: See HCP within 4 hours (or PCP triage)    Care advice provided, patient verbalizes understanding; denies any other questions or concerns; instructed to call back for any new or worsening symptoms. Writer provided warm transfer to [facility] at [facility] for appointment scheduling     Attention Provider: Thank you for allowing me to participate in the care of your patient. The patient was connected to triage in response to information provided to the ECC/PSC. Please do not respond through this encounter as the response is not directed to a shared pool. Reason for Disposition   MILD difficulty breathing (e.g., minimal/no SOB at rest, SOB with walking, pulse <100)    Answer Assessment - Initial Assessment Questions  1. COVID-19 DIAGNOSIS: \"Who made your COVID-19 diagnosis? \" \"Was it confirmed by a positive lab test?\" If not diagnosed by a HCP, ask \"Are there lots of cases (community spread) where you live? \" Note: See public health department website, if unsure. Yes    2. COVID-19 EXPOSURE: \"Was there any known exposure to COVID before the symptoms began? \" CDC Definition of close contact: within 6 feet (2 meters) for a total of 15 minutes or more over a 24-hour period. Patient denies    3. ONSET: \"When did the COVID-19 symptoms start? \"       3 weeks ago    4. WORST SYMPTOM: \"What is your worst symptom? \" (e.g., cough, fever, shortness of breath, muscle aches)     Nasal congestion     5. COUGH: \"Do you have a cough? \" If Yes, ask: \"How bad is the cough? \"        Yes     6. FEVER: \"Do you have a fever? \" If Yes, ask: \"What is your temperature, how was it measured, and when did it start? \"      Patient denies    7. RESPIRATORY STATUS: \"Describe your breathing? \" (e.g., shortness of breath, wheezing, unable to speak)       SOB seems to be worse at night    8. BETTER-SAME-WORSE: [place] Pounds you getting better, staying the same or getting worse compared to yesterday? \"  If getting worse, ask, \"In what way? \"      Comes and goes but the congestion is getting worse    9. HIGH RISK DISEASE: \"Do you have any chronic medical problems? \" (e.g., asthma, heart or lung disease, weak immune system, obesity, etc.)      Asthma- inhaler and heart disease    10. VACCINE: \"Have you gotten the COVID-19 vaccine? \" If Yes ask: \"Which one, how many shots, when did you get it? \"        Yes, 2 - did not get the booster shot. 11. PREGNANCY: \"Is there any chance you are pregnant? \" \"When was your last menstrual period? \"        N/a    12. OTHER SYMPTOMS: \"Do you have any other symptoms? \"  (e.g., chills, fatigue, headache, loss of smell or taste, muscle pain, sore throat; new loss of smell or taste especially support the diagnosis of COVID-19)        Loss of smell, SOB at night, nasal congestion, runny nose, vomiting (when it started), headache, and cough.     Protocols used: CORONAVIRUS (COVID-19) DIAGNOSED OR SUSPECTED-ADULT-

## 2022-01-28 NOTE — TELEPHONE ENCOUNTER
Patient is coming to office 1/31/2022. She has had symptoms for 3 weeks. She thinks she has a sinus infection.

## 2022-01-31 ENCOUNTER — OFFICE VISIT (OUTPATIENT)
Dept: PRIMARY CARE CLINIC | Age: 67
End: 2022-01-31
Payer: COMMERCIAL

## 2022-01-31 VITALS
HEIGHT: 64 IN | SYSTOLIC BLOOD PRESSURE: 130 MMHG | OXYGEN SATURATION: 98 % | RESPIRATION RATE: 18 BRPM | DIASTOLIC BLOOD PRESSURE: 70 MMHG | WEIGHT: 154 LBS | TEMPERATURE: 97.2 F | HEART RATE: 75 BPM | BODY MASS INDEX: 26.29 KG/M2

## 2022-01-31 DIAGNOSIS — Z72.0 TOBACCO ABUSE: ICD-10-CM

## 2022-01-31 DIAGNOSIS — J01.10 ACUTE NON-RECURRENT FRONTAL SINUSITIS: ICD-10-CM

## 2022-01-31 DIAGNOSIS — J45.21 MILD INTERMITTENT ASTHMA WITH ACUTE EXACERBATION: Primary | ICD-10-CM

## 2022-01-31 DIAGNOSIS — J45.21 MILD INTERMITTENT ASTHMA WITH ACUTE EXACERBATION: ICD-10-CM

## 2022-01-31 DIAGNOSIS — M1A.00X0 IDIOPATHIC CHRONIC GOUT WITHOUT TOPHUS, UNSPECIFIED SITE: ICD-10-CM

## 2022-01-31 DIAGNOSIS — B37.31 YEAST VAGINITIS: ICD-10-CM

## 2022-01-31 PROCEDURE — 3017F COLORECTAL CA SCREEN DOC REV: CPT | Performed by: PHYSICIAN ASSISTANT

## 2022-01-31 PROCEDURE — G8484 FLU IMMUNIZE NO ADMIN: HCPCS | Performed by: PHYSICIAN ASSISTANT

## 2022-01-31 PROCEDURE — G8427 DOCREV CUR MEDS BY ELIG CLIN: HCPCS | Performed by: PHYSICIAN ASSISTANT

## 2022-01-31 PROCEDURE — G8419 CALC BMI OUT NRM PARAM NOF/U: HCPCS | Performed by: PHYSICIAN ASSISTANT

## 2022-01-31 PROCEDURE — 99214 OFFICE O/P EST MOD 30 MIN: CPT | Performed by: PHYSICIAN ASSISTANT

## 2022-01-31 PROCEDURE — 4004F PT TOBACCO SCREEN RCVD TLK: CPT | Performed by: PHYSICIAN ASSISTANT

## 2022-01-31 RX ORDER — BENZONATATE 100 MG/1
100 CAPSULE ORAL 2 TIMES DAILY PRN
Qty: 20 CAPSULE | Refills: 0 | Status: SHIPPED | OUTPATIENT
Start: 2022-01-31 | End: 2022-02-07

## 2022-01-31 RX ORDER — ALLOPURINOL 100 MG/1
TABLET ORAL
Qty: 60 TABLET | Refills: 1 | Status: SHIPPED
Start: 2022-01-31 | End: 2022-05-24

## 2022-01-31 RX ORDER — ALBUTEROL SULFATE 90 UG/1
2 AEROSOL, METERED RESPIRATORY (INHALATION) EVERY 4 HOURS PRN
Qty: 18 G | Refills: 5 | Status: SHIPPED
Start: 2022-01-31 | End: 2022-08-10

## 2022-01-31 RX ORDER — DOXYCYCLINE HYCLATE 100 MG
100 TABLET ORAL 2 TIMES DAILY
Qty: 20 TABLET | Refills: 0 | Status: SHIPPED | OUTPATIENT
Start: 2022-01-31 | End: 2022-02-10

## 2022-01-31 RX ORDER — BUDESONIDE AND FORMOTEROL FUMARATE DIHYDRATE 160; 4.5 UG/1; UG/1
2 AEROSOL RESPIRATORY (INHALATION) 2 TIMES DAILY
Qty: 1 EACH | Refills: 5 | Status: SHIPPED
Start: 2022-01-31 | End: 2022-01-31 | Stop reason: SDUPTHER

## 2022-01-31 RX ORDER — BUDESONIDE AND FORMOTEROL FUMARATE DIHYDRATE 160; 4.5 UG/1; UG/1
2 AEROSOL RESPIRATORY (INHALATION) 2 TIMES DAILY
Qty: 1 EACH | Refills: 5 | Status: SHIPPED
Start: 2022-01-31 | End: 2022-03-30

## 2022-01-31 RX ORDER — FLUCONAZOLE 150 MG/1
150 TABLET ORAL ONCE
Qty: 2 TABLET | Refills: 0 | Status: SHIPPED | OUTPATIENT
Start: 2022-01-31 | End: 2022-01-31

## 2022-01-31 SDOH — ECONOMIC STABILITY: FOOD INSECURITY: WITHIN THE PAST 12 MONTHS, YOU WORRIED THAT YOUR FOOD WOULD RUN OUT BEFORE YOU GOT MONEY TO BUY MORE.: NEVER TRUE

## 2022-01-31 SDOH — ECONOMIC STABILITY: FOOD INSECURITY: WITHIN THE PAST 12 MONTHS, THE FOOD YOU BOUGHT JUST DIDN'T LAST AND YOU DIDN'T HAVE MONEY TO GET MORE.: NEVER TRUE

## 2022-01-31 ASSESSMENT — SOCIAL DETERMINANTS OF HEALTH (SDOH): HOW HARD IS IT FOR YOU TO PAY FOR THE VERY BASICS LIKE FOOD, HOUSING, MEDICAL CARE, AND HEATING?: NOT HARD AT ALL

## 2022-01-31 NOTE — PROGRESS NOTES
Congestion:  Patient is here with complaints of congestion, frontal sinus pressure, drainage and cough for \"a couple\" week(s). Cough is  productive. No fevers. Breathing \"Is terrible. \"  Over the counter medications include none. She is using inhalers. Patient does not have a change in appetite. Patient is  drinking well. Patient does smoke, was unable to quit with nicotine transdermal.  Sick contacts include none. She anticipates yeast vaginitis with the atb use. Patient's past medical, surgical, social and/or family history reviewed, updated in chart, and are non-contributory (unless otherwise stated). Medications and allergies also reviewed and updated in chart. Review of Systems:  Constitutional:  - fever, + fatigue, + chills, + headaches, no weight change, +reduced appetite  Dermatology:  No rash, no mole, no dry or sensitive skin  ENT:  + cough, + sore throat, + sinus pain, + runny nose, no ear pain  Cardiology:  No chest pain, no palpitations, no leg edema, no shortness of breath, no PND  Gastroenterology:  No dysphagia, no abdominal pain, no nausea, no vomiting, no constipation, no diarrhea, no heartburn  Musculoskeletal:  No joint pain, no leg cramps, no back pain, no muscle aches  Respiratory:  No shortness of breath, no orthopnea, no wheezing, no ALMEIDA, no hemoptysis  Urology:  No blood in the urine, no urinary frequency, no urinary incontinence, no urinary urgency, no nocturia, no dysuria      Vitals:    01/31/22 1031   BP: 130/70   Pulse: 75   Resp: 18   Temp: 97.2 °F (36.2 °C)   SpO2: 98%   Weight: 154 lb (69.9 kg)   Height: 5' 4\" (1.626 m)       Physical Exam  Constitutional:       General: She is not in acute distress. Appearance: She is well-developed. HENT:      Head: Normocephalic and atraumatic. Right Ear: External ear normal.      Left Ear: External ear normal.      Nose:      Right Sinus: Frontal sinus tenderness present.       Left Sinus: Frontal sinus tenderness present. Eyes:      General: No scleral icterus. Conjunctiva/sclera: Conjunctivae normal.      Pupils: Pupils are equal, round, and reactive to light. Neck:      Thyroid: No thyromegaly. Cardiovascular:      Rate and Rhythm: Normal rate and regular rhythm. Heart sounds: Normal heart sounds. No murmur heard. Pulmonary:      Effort: Pulmonary effort is normal. No accessory muscle usage or respiratory distress. Breath sounds: Normal breath sounds. Decreased air movement present. No wheezing. Comments: Breath sounds are coarse  Musculoskeletal:         General: Normal range of motion. Cervical back: Normal range of motion and neck supple. Skin:     General: Skin is warm and dry. Findings: No rash. Neurological:      Mental Status: She is alert and oriented to person, place, and time. Deep Tendon Reflexes: Reflexes are normal and symmetric. Psychiatric:         Speech: Speech normal.         Behavior: Behavior normal.         Assessment/Plan:      Andry Michaud was seen today for congestion. Diagnoses and all orders for this visit:    Mild intermittent asthma with acute exacerbation  -     budesonide-formoterol (SYMBICORT) 160-4.5 MCG/ACT AERO; Inhale 2 puffs into the lungs 2 times daily  -     albuterol sulfate  (90 Base) MCG/ACT inhaler; Inhale 2 puffs into the lungs every 4 hours as needed for Wheezing  -     doxycycline hyclate (VIBRA-TABS) 100 MG tablet; Take 1 tablet by mouth 2 times daily for 10 days  -     benzonatate (TESSALON) 100 MG capsule; Take 1 capsule by mouth 2 times daily as needed for Cough  -     dexamethasone (DECADRON) 0.75 MG tablet; Take 1 tablet by mouth 3 times daily for 5 days    Acute non-recurrent frontal sinusitis    Tobacco abuse    Yeast vaginitis  -     fluconazole (DIFLUCAN) 150 MG tablet; Take 1 tablet by mouth once for 1 dose May repeat in 3-5 days, if symptoms persist or recur. As above.   Call or go to ED immediately if symptoms worsen or persist.  Return in about 4 weeks (around 2/28/2022). , or sooner if necessary. Educational materials and/or home exercises printed for patient's review and were included in patient instructions on his/her After Visit Summary and given to patient at the end of visit. Counseled regarding above diagnosis, including possible risks and complications,  especially if left uncontrolled. Counseled regarding the possible side effects, risks, benefits and alternatives to treatment; patient and/or guardian verbalizes understanding, agrees, feels comfortable with and wishes to proceed with above treatment plan. Advised patient to call with any new medication issues, and read all Rx info from pharmacy to assure aware of all possible risks and side effects of medication before taking. Reviewed age and gender appropriate health screening exams and vaccinations. Advised patient regarding importance of keeping up with recommended health maintenance and to schedule as soon as possible if overdue, as this is important in assessing for undiagnosed pathology, especially cancer, as well as protecting against potentially harmful/life threatening disease. Patient and/or guardian verbalizes understanding and agrees with above counseling, assessment and plan. All questions answered. Shadia Freitas PA-C  1/31/2022    I have personally reviewed and updated the chief complaint, HPI, Past Medical, Family and Social History, as well as the above Review of Systems.

## 2022-02-25 DIAGNOSIS — M54.41 ACUTE BILATERAL LOW BACK PAIN WITH RIGHT-SIDED SCIATICA: ICD-10-CM

## 2022-02-25 RX ORDER — MELOXICAM 7.5 MG/1
7.5 TABLET ORAL DAILY PRN
Qty: 30 TABLET | Refills: 5 | Status: SHIPPED
Start: 2022-02-25 | End: 2022-05-24

## 2022-03-02 ENCOUNTER — OFFICE VISIT (OUTPATIENT)
Dept: PRIMARY CARE CLINIC | Age: 67
End: 2022-03-02
Payer: MEDICARE

## 2022-03-02 VITALS
HEIGHT: 65 IN | WEIGHT: 151 LBS | HEART RATE: 81 BPM | SYSTOLIC BLOOD PRESSURE: 158 MMHG | DIASTOLIC BLOOD PRESSURE: 66 MMHG | TEMPERATURE: 97.4 F | RESPIRATION RATE: 12 BRPM | OXYGEN SATURATION: 99 % | BODY MASS INDEX: 25.16 KG/M2

## 2022-03-02 DIAGNOSIS — J45.20 MILD INTERMITTENT ASTHMA WITHOUT COMPLICATION: ICD-10-CM

## 2022-03-02 DIAGNOSIS — J30.2 SEASONAL ALLERGIES: ICD-10-CM

## 2022-03-02 DIAGNOSIS — Z12.11 COLON CANCER SCREENING: ICD-10-CM

## 2022-03-02 DIAGNOSIS — M54.41 ACUTE BILATERAL LOW BACK PAIN WITH RIGHT-SIDED SCIATICA: ICD-10-CM

## 2022-03-02 DIAGNOSIS — F17.200 SMOKER: ICD-10-CM

## 2022-03-02 DIAGNOSIS — R09.81 NASAL CONGESTION: ICD-10-CM

## 2022-03-02 DIAGNOSIS — I10 PRIMARY HYPERTENSION: Primary | ICD-10-CM

## 2022-03-02 PROCEDURE — 1123F ACP DISCUSS/DSCN MKR DOCD: CPT | Performed by: PHYSICIAN ASSISTANT

## 2022-03-02 PROCEDURE — G8400 PT W/DXA NO RESULTS DOC: HCPCS | Performed by: PHYSICIAN ASSISTANT

## 2022-03-02 PROCEDURE — 3017F COLORECTAL CA SCREEN DOC REV: CPT | Performed by: PHYSICIAN ASSISTANT

## 2022-03-02 PROCEDURE — G8484 FLU IMMUNIZE NO ADMIN: HCPCS | Performed by: PHYSICIAN ASSISTANT

## 2022-03-02 PROCEDURE — 4004F PT TOBACCO SCREEN RCVD TLK: CPT | Performed by: PHYSICIAN ASSISTANT

## 2022-03-02 PROCEDURE — 4040F PNEUMOC VAC/ADMIN/RCVD: CPT | Performed by: PHYSICIAN ASSISTANT

## 2022-03-02 PROCEDURE — 1090F PRES/ABSN URINE INCON ASSESS: CPT | Performed by: PHYSICIAN ASSISTANT

## 2022-03-02 PROCEDURE — G8427 DOCREV CUR MEDS BY ELIG CLIN: HCPCS | Performed by: PHYSICIAN ASSISTANT

## 2022-03-02 PROCEDURE — 99214 OFFICE O/P EST MOD 30 MIN: CPT | Performed by: PHYSICIAN ASSISTANT

## 2022-03-02 PROCEDURE — G8417 CALC BMI ABV UP PARAM F/U: HCPCS | Performed by: PHYSICIAN ASSISTANT

## 2022-03-02 RX ORDER — LORATADINE 10 MG/1
TABLET ORAL
COMMUNITY
Start: 2022-02-28 | End: 2022-03-02

## 2022-03-02 RX ORDER — CYCLOBENZAPRINE HCL 5 MG
5 TABLET ORAL NIGHTLY PRN
Qty: 30 TABLET | Refills: 1 | Status: SHIPPED
Start: 2022-03-02 | End: 2022-05-24

## 2022-03-02 RX ORDER — LOSARTAN POTASSIUM 25 MG/1
25 TABLET ORAL DAILY
Qty: 30 TABLET | Refills: 5 | Status: SHIPPED
Start: 2022-03-02 | End: 2022-05-11

## 2022-03-02 RX ORDER — FLUTICASONE PROPIONATE 50 MCG
2 SPRAY, SUSPENSION (ML) NASAL DAILY
Qty: 1 EACH | Refills: 5 | Status: SHIPPED
Start: 2022-03-02 | End: 2022-05-24

## 2022-03-02 RX ORDER — BENZONATATE 100 MG/1
100 CAPSULE ORAL 2 TIMES DAILY PRN
Qty: 20 CAPSULE | Refills: 0 | Status: SHIPPED | OUTPATIENT
Start: 2022-03-02 | End: 2022-03-09

## 2022-03-02 ASSESSMENT — PATIENT HEALTH QUESTIONNAIRE - PHQ9
SUM OF ALL RESPONSES TO PHQ QUESTIONS 1-9: 0
2. FEELING DOWN, DEPRESSED OR HOPELESS: 0
SUM OF ALL RESPONSES TO PHQ QUESTIONS 1-9: 0
SUM OF ALL RESPONSES TO PHQ9 QUESTIONS 1 & 2: 0
1. LITTLE INTEREST OR PLEASURE IN DOING THINGS: 0
SUM OF ALL RESPONSES TO PHQ QUESTIONS 1-9: 0
SUM OF ALL RESPONSES TO PHQ QUESTIONS 1-9: 0

## 2022-03-02 NOTE — PROGRESS NOTES
Hypertension:  Patient is here for follow up chronic hypertension. This is not generally controlled on current medication regimen. Takes meds as directed and tolerates them well. Most recent labs reviewed with patient and are not remarkable. No symptoms from htn standpoint per ROS. Patient is not compliant with lifestyle modifications. Patient does smoke. Comorbid conditions include hyperlipidemia. She has a hx of asthma, and since we changed inhalers, her sx have improved. Hyperlipidemia:  Patient is here to follow up regarding chronic hyperlipidemia. This is  generally controlled. Treatment includes statin. Patient is  compliant with lifestyle modifications. Patient is  a smoker. Most recent labs reviewed with patient today and are not remarkable. Lab Results   Component Value Date    LDLCALC 60 01/12/2021       Patient's past medical, surgical, social and/or family history reviewed, updated in chart, and are non-contributory (unless otherwise stated). Medications and allergies also reviewed and updated in chart.       Review of Systems:  Constitutional:  No fever, no fatigue, no chills, no headaches, no weight change  Dermatology:  No rash, no mole, no dry or sensitive skin  ENT:  No cough, no sore throat, no sinus pain, no runny nose, no ear pain  Cardiology:  No chest pain, no palpitations, no leg edema, no shortness of breath, no PND  Gastroenterology:  No dysphagia, no abdominal pain, no nausea, no vomiting, no constipation, no diarrhea, no heartburn  Musculoskeletal:  No joint pain, no leg cramps, no back pain, no muscle aches  Respiratory:  No shortness of breath, no orthopnea, no wheezing, no ALMEIDA, no hemoptysis  Urology:  No blood in the urine, no urinary frequency, no urinary incontinence, no urinary urgency, no nocturia, no dysuria      Vitals:    03/02/22 0842 03/02/22 0856   BP: (!) 160/70 (!) 158/66   Pulse: 81    Resp: 12    Temp: 97.4 °F (36.3 °C)    SpO2: 99%    Weight: 151 lb (68.5 kg)    Height: 5' 5\" (1.651 m)        Physical Exam  Constitutional:       General: She is not in acute distress. Appearance: She is well-developed. HENT:      Head: Normocephalic and atraumatic. Right Ear: External ear normal.      Left Ear: External ear normal.      Nose: Nose normal.   Eyes:      General: No scleral icterus. Conjunctiva/sclera: Conjunctivae normal.      Pupils: Pupils are equal, round, and reactive to light. Neck:      Thyroid: No thyromegaly. Cardiovascular:      Rate and Rhythm: Normal rate and regular rhythm. Heart sounds: Normal heart sounds. No murmur heard. Pulmonary:      Effort: Pulmonary effort is normal. No accessory muscle usage or respiratory distress. Breath sounds: Normal breath sounds. No wheezing. Musculoskeletal:         General: Normal range of motion. Cervical back: Normal range of motion and neck supple. Right lower leg: No edema. Left lower leg: No edema. Skin:     General: Skin is warm and dry. Findings: No rash. Neurological:      Mental Status: She is alert and oriented to person, place, and time. Deep Tendon Reflexes: Reflexes are normal and symmetric. Psychiatric:         Speech: Speech normal.         Behavior: Behavior normal.         Assessment/Plan:      Ronni Blood was seen today for 1 month follow-up. Diagnoses and all orders for this visit:    Primary hypertension  -     LIPID PANEL; Future  -     Basic Metabolic Panel; Future  -     CBC with Auto Differential; Future    Acute bilateral low back pain with right-sided sciatica  -     cyclobenzaprine (FLEXERIL) 5 MG tablet;  Take 1 tablet by mouth nightly as needed for Muscle spasms    Seasonal allergies  -     fluticasone (FLONASE) 50 MCG/ACT nasal spray; 2 sprays by Nasal route daily    Nasal congestion  -     fluticasone (FLONASE) 50 MCG/ACT nasal spray; 2 sprays by Nasal route daily    Smoker  Discussed methods of smoking cessation including Nicoderm patch, gum, and inhaler, Zyban and Chantix. All side effects explained. Pt chose none. Mild intermittent asthma without complication  - stable     Colon cancer screening  -     Fecal DNA Colorectal cancer screening (Cologuard)    Other orders  -     losartan (COZAAR) 25 MG tablet; Take 1 tablet by mouth daily  -     benzonatate (TESSALON) 100 MG capsule; Take 1 capsule by mouth 2 times daily as needed for Cough      As above. Call or go to ED immediately if symptoms worsen or persist.  Return in about 4 weeks (around 3/30/2022) for htn., or sooner if necessary. Educational materials and/or home exercises printed for patient's review and were included in patient instructions on his/her After Visit Summary and given to patient at the end of visit. Counseled regarding above diagnosis, including possible risks and complications,  especially if left uncontrolled. Counseled regarding the possible side effects, risks, benefits and alternatives to treatment; patient and/or guardian verbalizes understanding, agrees, feels comfortable with and wishes to proceed with above treatment plan. Advised patient to call with any new medication issues, and read all Rx info from pharmacy to assure aware of all possible risks and side effects of medication before taking. Reviewed age and gender appropriate health screening exams and vaccinations. Advised patient regarding importance of keeping up with recommended health maintenance and to schedule as soon as possible if overdue, as this is important in assessing for undiagnosed pathology, especially cancer, as well as protecting against potentially harmful/life threatening disease. Patient and/or guardian verbalizes understanding and agrees with above counseling, assessment and plan. All questions answered.     Tegan Gagnon PA-C  3/2/2022    I have personally reviewed and updated the chief complaint, HPI, Past Medical, Family and Social History, as well as the above Review of Systems.

## 2022-03-21 ENCOUNTER — TELEPHONE (OUTPATIENT)
Dept: PRIMARY CARE CLINIC | Age: 67
End: 2022-03-21

## 2022-03-21 RX ORDER — DOCUSATE SODIUM 100 MG/1
100 CAPSULE, LIQUID FILLED ORAL 2 TIMES DAILY
Qty: 60 CAPSULE | Refills: 0 | Status: SHIPPED | OUTPATIENT
Start: 2022-03-21 | End: 2022-04-20

## 2022-03-21 NOTE — TELEPHONE ENCOUNTER
Patient called c/o of constipation and nausea. Last BM was 4 days ago, very hard. She has tried Miralax 2 times with no results. She is asking for the \"red pill \" you gave her before that worked.   Yessica Lombardi

## 2022-04-05 ENCOUNTER — TELEPHONE (OUTPATIENT)
Dept: PRIMARY CARE CLINIC | Age: 67
End: 2022-04-05

## 2022-04-05 NOTE — TELEPHONE ENCOUNTER
----- Message from Devin Mendoza sent at 4/5/2022  1:07 PM EDT -----  Subject: Message to Provider    QUESTIONS  Information for Provider? Patient is calling because she was prescribed a   new BP medication and it is making her sick. She quit taking it and is   feeling a lot better but wants a call from Christy Osei to talk about other   medication possibilities. She can not recall the name of the medication at   this time. ---------------------------------------------------------------------------  --------------  Melissafan Blackman INFO  What is the best way for the office to contact you? OK to leave message on   voicemail  Preferred Call Back Phone Number? [phone]  ---------------------------------------------------------------------------  --------------  SCRIPT ANSWERS  Relationship to Patient?  Self

## 2022-04-06 ENCOUNTER — TELEPHONE (OUTPATIENT)
Dept: PRIMARY CARE CLINIC | Age: 67
End: 2022-04-06

## 2022-04-06 NOTE — TELEPHONE ENCOUNTER
Does she have a blood pressure cuff? Check bp.  I would assume it is the meloxicam, hold that one first

## 2022-04-06 NOTE — TELEPHONE ENCOUNTER
Pt calling not sure what is making her sick she is feeling jittery and nauseated she thinks it maybe the meloxicam or losartan which is a new bp med could they be interacting with each other    Advise    Pt uses [address]

## 2022-04-08 ENCOUNTER — TELEPHONE (OUTPATIENT)
Dept: PRIMARY CARE CLINIC | Age: 67
End: 2022-04-08

## 2022-04-08 NOTE — TELEPHONE ENCOUNTER
----- Message from [facility] sent at 4/8/2022  8:40 AM EDT -----  Subject: Message to Provider    QUESTIONS  Information for Provider? pt has missed work since thursday with issue of   not feeling well message in system can she have a work excuse to return to   work please contact patient asap she is willing to do a virtual if   necessary  ---------------------------------------------------------------------------  --------------  [address]  What is the best way for the office to contact you? OK to leave message on   voicemail  Preferred Call Back Phone Number? [phone]  ---------------------------------------------------------------------------  --------------  SCRIPT ANSWERS  Relationship to Patient?  Self

## 2022-04-08 NOTE — TELEPHONE ENCOUNTER
Pt states she is just now starting to feel a little better since stopping the meloxican, she would like a work excuse to return on Monday, she has an appt at office of wed next week,  advise

## 2022-04-13 ENCOUNTER — OFFICE VISIT (OUTPATIENT)
Dept: PRIMARY CARE CLINIC | Age: 67
End: 2022-04-13
Payer: MEDICARE

## 2022-04-13 VITALS
WEIGHT: 148.6 LBS | HEIGHT: 64 IN | OXYGEN SATURATION: 100 % | TEMPERATURE: 97.4 F | RESPIRATION RATE: 14 BRPM | HEART RATE: 86 BPM | SYSTOLIC BLOOD PRESSURE: 166 MMHG | DIASTOLIC BLOOD PRESSURE: 68 MMHG | BODY MASS INDEX: 25.37 KG/M2

## 2022-04-13 DIAGNOSIS — I10 PRIMARY HYPERTENSION: ICD-10-CM

## 2022-04-13 DIAGNOSIS — I35.0 SEVERE AORTIC STENOSIS: ICD-10-CM

## 2022-04-13 DIAGNOSIS — E78.00 PURE HYPERCHOLESTEROLEMIA: ICD-10-CM

## 2022-04-13 DIAGNOSIS — R11.0 NAUSEA: ICD-10-CM

## 2022-04-13 DIAGNOSIS — I10 PRIMARY HYPERTENSION: Primary | ICD-10-CM

## 2022-04-13 LAB
ANION GAP SERPL CALCULATED.3IONS-SCNC: 17 MMOL/L (ref 7–16)
BASOPHILS ABSOLUTE: 0.03 E9/L (ref 0–0.2)
BASOPHILS RELATIVE PERCENT: 0.3 % (ref 0–2)
BUN BLDV-MCNC: 18 MG/DL (ref 6–23)
CALCIUM SERPL-MCNC: 9.9 MG/DL (ref 8.6–10.2)
CHLORIDE BLD-SCNC: 107 MMOL/L (ref 98–107)
CHOLESTEROL, TOTAL: 118 MG/DL (ref 0–199)
CO2: 20 MMOL/L (ref 22–29)
CREAT SERPL-MCNC: 1.3 MG/DL (ref 0.5–1)
EOSINOPHILS ABSOLUTE: 0.11 E9/L (ref 0.05–0.5)
EOSINOPHILS RELATIVE PERCENT: 1.3 % (ref 0–6)
GFR AFRICAN AMERICAN: 50
GFR NON-AFRICAN AMERICAN: 50 ML/MIN/1.73
GLUCOSE BLD-MCNC: 77 MG/DL (ref 74–99)
HCT VFR BLD CALC: 43 % (ref 34–48)
HDLC SERPL-MCNC: 44 MG/DL
HEMOGLOBIN: 13.3 G/DL (ref 11.5–15.5)
IMMATURE GRANULOCYTES #: 0.02 E9/L
IMMATURE GRANULOCYTES %: 0.2 % (ref 0–5)
LDL CHOLESTEROL CALCULATED: 53 MG/DL (ref 0–99)
LYMPHOCYTES ABSOLUTE: 2.51 E9/L (ref 1.5–4)
LYMPHOCYTES RELATIVE PERCENT: 28.7 % (ref 20–42)
MCH RBC QN AUTO: 32.8 PG (ref 26–35)
MCHC RBC AUTO-ENTMCNC: 30.9 % (ref 32–34.5)
MCV RBC AUTO: 105.9 FL (ref 80–99.9)
MONOCYTES ABSOLUTE: 0.7 E9/L (ref 0.1–0.95)
MONOCYTES RELATIVE PERCENT: 8 % (ref 2–12)
NEUTROPHILS ABSOLUTE: 5.39 E9/L (ref 1.8–7.3)
NEUTROPHILS RELATIVE PERCENT: 61.5 % (ref 43–80)
PDW BLD-RTO: 14.6 FL (ref 11.5–15)
PLATELET # BLD: 208 E9/L (ref 130–450)
PMV BLD AUTO: 12.4 FL (ref 7–12)
POTASSIUM SERPL-SCNC: 4.6 MMOL/L (ref 3.5–5)
RBC # BLD: 4.06 E12/L (ref 3.5–5.5)
SODIUM BLD-SCNC: 144 MMOL/L (ref 132–146)
TRIGL SERPL-MCNC: 103 MG/DL (ref 0–149)
VLDLC SERPL CALC-MCNC: 21 MG/DL
WBC # BLD: 8.8 E9/L (ref 4.5–11.5)

## 2022-04-13 PROCEDURE — 1090F PRES/ABSN URINE INCON ASSESS: CPT | Performed by: PHYSICIAN ASSISTANT

## 2022-04-13 PROCEDURE — G8427 DOCREV CUR MEDS BY ELIG CLIN: HCPCS | Performed by: PHYSICIAN ASSISTANT

## 2022-04-13 PROCEDURE — 1123F ACP DISCUSS/DSCN MKR DOCD: CPT | Performed by: PHYSICIAN ASSISTANT

## 2022-04-13 PROCEDURE — 3017F COLORECTAL CA SCREEN DOC REV: CPT | Performed by: PHYSICIAN ASSISTANT

## 2022-04-13 PROCEDURE — 4040F PNEUMOC VAC/ADMIN/RCVD: CPT | Performed by: PHYSICIAN ASSISTANT

## 2022-04-13 PROCEDURE — 4004F PT TOBACCO SCREEN RCVD TLK: CPT | Performed by: PHYSICIAN ASSISTANT

## 2022-04-13 PROCEDURE — G8417 CALC BMI ABV UP PARAM F/U: HCPCS | Performed by: PHYSICIAN ASSISTANT

## 2022-04-13 PROCEDURE — 99214 OFFICE O/P EST MOD 30 MIN: CPT | Performed by: PHYSICIAN ASSISTANT

## 2022-04-13 PROCEDURE — G8400 PT W/DXA NO RESULTS DOC: HCPCS | Performed by: PHYSICIAN ASSISTANT

## 2022-04-13 NOTE — PROGRESS NOTES
Hypertension:  Patient is here for follow up chronic hypertension. This is not generally controlled on current medication regimen. Takes meds as directed and tolerates them well. She is holding her \"big white one\" from her pill pack bc she thought it was the meloxicam. We looked up pill pictures on [employer], and it is not meloxicam. She will double check with pharmacy. Most recent labs reviewed with patient and are not remarkable. No symptoms from htn standpoint per ROS. Patient is not compliant with lifestyle modifications. Patient does smoke. Comorbid conditions include hyperlipidemia. Since meloxicam was put in pill pack, she is now taking it daily. She has had stomach upset and  nausea. She did see [facility]. She was unhappy and wanted a new referral. She said that they \"were mad at me or something\" over scheduling a test.     Patient's past medical, surgical, social and/or family history reviewed, updated in chart, and are non-contributory (unless otherwise stated). Medications and allergies also reviewed and updated in chart.         Review of Systems:  Constitutional:  No fever, no fatigue, no chills, no headaches, no weight change  Dermatology:  No rash, no mole, no dry or sensitive skin  ENT:  No cough, no sore throat, no sinus pain, no runny nose, no ear pain  Cardiology:  No chest pain, no palpitations, no leg edema, no shortness of breath, no PND  Gastroenterology:  No dysphagia, no abdominal pain, no nausea, no vomiting, no constipation, no diarrhea, no heartburn  Musculoskeletal:  No joint pain, no leg cramps, no back pain, no muscle aches  Respiratory:  No shortness of breath, no orthopnea, no wheezing, no ALMEIDA, no hemoptysis  Urology:  No blood in the urine, no urinary frequency, no urinary incontinence, no urinary urgency, no nocturia, no dysuria    Vitals:    04/13/22 0849 04/13/22 0859   BP: (!) 170/70 (!) 166/68   Pulse: 86 86   Resp: 14    Temp: 97.4 °F (36.3 °C) 97.4 °F (36.3 °C) SpO2: 97% 100%   Weight: 148 lb 9.6 oz (67.4 kg)    Height: 5' 4\" (1.626 m)        Physical Exam  Constitutional:       General: She is not in acute distress. Appearance: She is well-developed. HENT:      Head: Normocephalic and atraumatic. Right Ear: External ear normal.      Left Ear: External ear normal.      Nose: Nose normal.   Eyes:      General: No scleral icterus. Conjunctiva/sclera: Conjunctivae normal.      Pupils: Pupils are equal, round, and reactive to light. Neck:      Thyroid: No thyromegaly. Cardiovascular:      Rate and Rhythm: Normal rate and regular rhythm. Heart sounds: Murmur (systolic 3/6 ) heard. Pulmonary:      Effort: Pulmonary effort is normal. No accessory muscle usage or respiratory distress. Breath sounds: Normal breath sounds. No wheezing. Musculoskeletal:         General: Normal range of motion. Cervical back: Normal range of motion and neck supple. Right lower leg: No edema. Left lower leg: No edema. Skin:     General: Skin is warm and dry. Findings: No rash. Neurological:      Mental Status: She is alert and oriented to person, place, and time. Deep Tendon Reflexes: Reflexes are normal and symmetric. Psychiatric:         Speech: Speech normal.         Behavior: Behavior normal.         Assessment/Plan:      Mario Alberto was seen today for hypertension. Diagnoses and all orders for this visit:    Primary hypertension  - she is likely not taking a medication for bp as she thought it was meloxicam. We discussed which med that is. She will double check with pharmacist.     Pure hypercholesterolemia  -     LIPID PANEL; Future    Nausea  -d/c nsaid    Severe aortic stenosis  -     Mariaelena Beyer DO, Cardiothoracic Surgery[facility]ha  - reviewed echo and notes, they planned for valve replacement    As above.   Call or go to ED immediately if symptoms worsen or persist.  Return in about 2 weeks (around 4/27/2022) for htn., or sooner if necessary. Educational materials and/or home exercises printed for patient's review and were included in patient instructions on his/her After Visit Summary and given to patient at the end of visit. Counseled regarding above diagnosis, including possible risks and complications,  especially if left uncontrolled. Counseled regarding the possible side effects, risks, benefits and alternatives to treatment; patient and/or guardian verbalizes understanding, agrees, feels comfortable with and wishes to proceed with above treatment plan. Advised patient to call with any new medication issues, and read all Rx info from pharmacy to assure aware of all possible risks and side effects of medication before taking. Reviewed age and gender appropriate health screening exams and vaccinations. Advised patient regarding importance of keeping up with recommended health maintenance and to schedule as soon as possible if overdue, as this is important in assessing for undiagnosed pathology, especially cancer, as well as protecting against potentially harmful/life threatening disease. Patient and/or guardian verbalizes understanding and agrees with above counseling, assessment and plan. All questions answered. Yoel Pagan PA-C  4/13/2022    I have personally reviewed and updated the chief complaint, HPI, Past Medical, Family and Social History, as well as the above Review of Systems.

## 2022-04-14 ENCOUNTER — TELEPHONE (OUTPATIENT)
Dept: PRIMARY CARE CLINIC | Age: 67
End: 2022-04-14

## 2022-04-14 NOTE — TELEPHONE ENCOUNTER
Have her drop off a urine and call me with results. It sounds like a uti.  Otherwise she can go to a ready care

## 2022-04-15 ENCOUNTER — TELEPHONE (OUTPATIENT)
Dept: PRIMARY CARE CLINIC | Age: 67
End: 2022-04-15

## 2022-04-15 ENCOUNTER — OFFICE VISIT (OUTPATIENT)
Dept: PRIMARY CARE CLINIC | Age: 67
End: 2022-04-15
Payer: MEDICARE

## 2022-04-15 VITALS
HEART RATE: 82 BPM | OXYGEN SATURATION: 98 % | HEIGHT: 64 IN | RESPIRATION RATE: 18 BRPM | WEIGHT: 144 LBS | SYSTOLIC BLOOD PRESSURE: 138 MMHG | TEMPERATURE: 96.8 F | BODY MASS INDEX: 24.59 KG/M2 | DIASTOLIC BLOOD PRESSURE: 60 MMHG

## 2022-04-15 DIAGNOSIS — R30.0 DYSURIA: ICD-10-CM

## 2022-04-15 DIAGNOSIS — Z91.81 AT HIGH RISK FOR FALLS: ICD-10-CM

## 2022-04-15 DIAGNOSIS — I10 PRIMARY HYPERTENSION: ICD-10-CM

## 2022-04-15 DIAGNOSIS — R35.0 FREQUENCY OF URINATION: Primary | ICD-10-CM

## 2022-04-15 LAB
BILIRUBIN, POC: NORMAL
BLOOD URINE, POC: NORMAL
CLARITY, POC: CLEAR
COLOR, POC: YELLOW
GLUCOSE URINE, POC: NORMAL
KETONES, POC: NORMAL
LEUKOCYTE EST, POC: NORMAL
NITRITE, POC: NORMAL
PH, POC: 6
PROTEIN, POC: >=300
SPECIFIC GRAVITY, POC: 1.02
UROBILINOGEN, POC: 1

## 2022-04-15 PROCEDURE — 99214 OFFICE O/P EST MOD 30 MIN: CPT | Performed by: PHYSICIAN ASSISTANT

## 2022-04-15 PROCEDURE — G8420 CALC BMI NORM PARAMETERS: HCPCS | Performed by: PHYSICIAN ASSISTANT

## 2022-04-15 PROCEDURE — G8427 DOCREV CUR MEDS BY ELIG CLIN: HCPCS | Performed by: PHYSICIAN ASSISTANT

## 2022-04-15 PROCEDURE — 4004F PT TOBACCO SCREEN RCVD TLK: CPT | Performed by: PHYSICIAN ASSISTANT

## 2022-04-15 PROCEDURE — G8400 PT W/DXA NO RESULTS DOC: HCPCS | Performed by: PHYSICIAN ASSISTANT

## 2022-04-15 PROCEDURE — 1090F PRES/ABSN URINE INCON ASSESS: CPT | Performed by: PHYSICIAN ASSISTANT

## 2022-04-15 PROCEDURE — 3017F COLORECTAL CA SCREEN DOC REV: CPT | Performed by: PHYSICIAN ASSISTANT

## 2022-04-15 PROCEDURE — 1123F ACP DISCUSS/DSCN MKR DOCD: CPT | Performed by: PHYSICIAN ASSISTANT

## 2022-04-15 PROCEDURE — 4040F PNEUMOC VAC/ADMIN/RCVD: CPT | Performed by: PHYSICIAN ASSISTANT

## 2022-04-15 PROCEDURE — 81002 URINALYSIS NONAUTO W/O SCOPE: CPT | Performed by: PHYSICIAN ASSISTANT

## 2022-04-15 RX ORDER — IBUPROFEN 400 MG/1
400 TABLET ORAL EVERY 6 HOURS PRN
Qty: 30 TABLET | Refills: 0 | Status: SHIPPED
Start: 2022-04-15 | End: 2022-05-24

## 2022-04-15 RX ORDER — NITROFURANTOIN 25; 75 MG/1; MG/1
100 CAPSULE ORAL 2 TIMES DAILY
Qty: 20 CAPSULE | Refills: 0 | Status: SHIPPED | OUTPATIENT
Start: 2022-04-15 | End: 2022-04-25

## 2022-04-15 RX ORDER — MULTIVIT-MIN/IRON FUM/FOLIC AC 7.5 MG-4
1 TABLET ORAL DAILY
Qty: 30 TABLET | Refills: 5 | Status: SHIPPED
Start: 2022-04-15 | End: 2022-05-24

## 2022-04-15 NOTE — PROGRESS NOTES
Dysuria:  Patient is here today with complaints dysuria. This has been going on for 3 day(s). Associated signs and symptoms include burning with urination, hesitancy, frequency and small voids. No gross hematuria. Alleviating factors include none. This has not happen to patient in the past.  Patient does not have genital complaints. She took meds to the pharmacist and is now sure which is her mobic. She is now taking bp meds correctly again. On the basis of positive falls risk screening, assessment and plan is as follows: home safety tips provided. Patient's past medical, surgical, social and/or family history reviewed, updated in chart, and are non-contributory (unless otherwise stated). Medications and allergies also reviewed and updated in chart. Review of Systems:  Constitutional:  No fever, no fatigue, no chills, no headaches, no weight change  Dermatology:  No rash, no mole, no dry or sensitive skin  ENT:  No cough, no sore throat, no sinus pain, no runny nose, no ear pain  Cardiology:  No chest pain, no palpitations, no leg edema, no shortness of breath, no PND  Gastroenterology:  No dysphagia, no abdominal pain, no nausea, no vomiting, no constipation, no diarrhea, no heartburn  Musculoskeletal:  No joint pain, no leg cramps, no back pain, no muscle aches  Respiratory:  No shortness of breath, no orthopnea, no wheezing, no ALMEIDA, no hemoptysis  Urology:  No blood in the urine, +urinary frequency,+ urinary incontinence, + urinary urgency, no nocturia, + dysuria       Vitals:    04/15/22 1007   BP: 138/60   Pulse: 82   Resp: 18   Temp: 96.8 °F (36 °C)   SpO2: 98%   Weight: 144 lb (65.3 kg)   Height: 5' 4\" (1.626 m)       Physical Exam  Constitutional:       General: She is not in acute distress. Appearance: She is well-developed. HENT:      Head: Normocephalic and atraumatic.       Right Ear: External ear normal.      Left Ear: External ear normal.      Nose: Nose normal.   Eyes: General: No scleral icterus. Conjunctiva/sclera: Conjunctivae normal.      Pupils: Pupils are equal, round, and reactive to light. Neck:      Thyroid: No thyromegaly. Cardiovascular:      Rate and Rhythm: Normal rate and regular rhythm. Heart sounds: Normal heart sounds. No murmur heard. Pulmonary:      Effort: Pulmonary effort is normal. No accessory muscle usage or respiratory distress. Breath sounds: Normal breath sounds. No wheezing. Abdominal:      Tenderness: There is abdominal tenderness (suprapubic tenderness). There is no right CVA tenderness or left CVA tenderness. Musculoskeletal:         General: Normal range of motion. Cervical back: Normal range of motion and neck supple. Skin:     General: Skin is warm and dry. Findings: No rash. Neurological:      Mental Status: She is alert and oriented to person, place, and time. Deep Tendon Reflexes: Reflexes are normal and symmetric. Psychiatric:         Speech: Speech normal.         Behavior: Behavior normal.         Assessment/Plan:      Krystyna Lopez was seen today for urinary tract infection. Diagnoses and all orders for this visit:    Frequency of urination  -     POCT Urinalysis no Micro  -     Culture, Urine    Dysuria  -     nitrofurantoin, macrocrystal-monohydrate, (MACROBID) 100 MG capsule; Take 1 capsule by mouth 2 times daily for 10 days  -     ibuprofen (ADVIL;MOTRIN) 400 MG tablet; Take 1 tablet by mouth every 6 hours as needed for Pain    At high risk for falls    Primary hypertension  -improved now that she is taking meds again    Other orders  -     Multiple Vitamins-Minerals (MULTIVITAMIN WITH MINERALS) tablet; Take 1 tablet by mouth daily      As above. Call or go to ED immediately if symptoms worsen or persist.  Return for schedule AWV with next apt., or sooner if necessary.       Educational materials and/or home exercises printed for patient's review and were included in patient instructions on his/her After Visit Summary and given to patient at the end of visit. Counseled regarding above diagnosis, including possible risks and complications,  especially if left uncontrolled. Counseled regarding the possible side effects, risks, benefits and alternatives to treatment; patient and/or guardian verbalizes understanding, agrees, feels comfortable with and wishes to proceed with above treatment plan. Advised patient to call with any new medication issues, and read all Rx info from pharmacy to assure aware of all possible risks and side effects of medication before taking. Reviewed age and gender appropriate health screening exams and vaccinations. Advised patient regarding importance of keeping up with recommended health maintenance and to schedule as soon as possible if overdue, as this is important in assessing for undiagnosed pathology, especially cancer, as well as protecting against potentially harmful/life threatening disease. Patient and/or guardian verbalizes understanding and agrees with above counseling, assessment and plan. All questions answered. Radha Wheeler PA-C  4/15/2022    I have personally reviewed and updated the chief complaint, HPI, Past Medical, Family and Social History, as well as the above Review of Systems.

## 2022-04-15 NOTE — TELEPHONE ENCOUNTER
Phoned pt can not use ibuprofen with mobic, pt states she stopped mobic because it made her sick with nausea and emesis.     advise

## 2022-04-15 NOTE — LETTER
[address] [facility],[address]  [address]  [facility] [address]  Phone: [phone]  Fax: [phone]    Willi Simpson        April 15, 2022     Patient: Kassy Perea   YOB: 1955   Date of Visit: 4/15/2022       To Whom It May Concern: It is my medical opinion that Kassy Perea should remain out of work until 4/19/22. If you have any questions or concerns, please don't hesitate to call.     Sincerely,        Mynor Mckenzie PA-C

## 2022-04-17 LAB — URINE CULTURE, ROUTINE: NORMAL

## 2022-04-18 ENCOUNTER — TELEPHONE (OUTPATIENT)
Dept: PRIMARY CARE CLINIC | Age: 67
End: 2022-04-18

## 2022-04-18 NOTE — TELEPHONE ENCOUNTER
----- Message from Terrie Robledo LPN sent at 1/57/5112 10:19 AM EDT -----  Subject: Message to Provider    QUESTIONS  Information for Provider? Patient would like a work excuse to be extend   would like to take a week off so the return to work date of 4/25 please   let patient know.  ---------------------------------------------------------------------------  --------------  [address]  What is the best way for the office to contact you? OK to leave message on   voicemail  Preferred Call Back Phone Number? [phone]  ---------------------------------------------------------------------------  --------------  SCRIPT ANSWERS  Relationship to Patient?  Self

## 2022-04-29 ENCOUNTER — OFFICE VISIT (OUTPATIENT)
Dept: PRIMARY CARE CLINIC | Age: 67
End: 2022-04-29
Payer: MEDICARE

## 2022-04-29 VITALS
SYSTOLIC BLOOD PRESSURE: 138 MMHG | RESPIRATION RATE: 18 BRPM | TEMPERATURE: 96.9 F | HEIGHT: 64 IN | OXYGEN SATURATION: 99 % | HEART RATE: 77 BPM | WEIGHT: 149 LBS | DIASTOLIC BLOOD PRESSURE: 60 MMHG | BODY MASS INDEX: 25.44 KG/M2

## 2022-04-29 VITALS
RESPIRATION RATE: 18 BRPM | OXYGEN SATURATION: 99 % | TEMPERATURE: 96.9 F | BODY MASS INDEX: 25.44 KG/M2 | WEIGHT: 149 LBS | HEIGHT: 64 IN | HEART RATE: 77 BPM | DIASTOLIC BLOOD PRESSURE: 60 MMHG | SYSTOLIC BLOOD PRESSURE: 138 MMHG

## 2022-04-29 DIAGNOSIS — H91.93 BILATERAL HEARING LOSS, UNSPECIFIED HEARING LOSS TYPE: ICD-10-CM

## 2022-04-29 DIAGNOSIS — Z12.4 CERVICAL CANCER SCREENING: ICD-10-CM

## 2022-04-29 DIAGNOSIS — Z01.419 WELL WOMAN EXAM WITH ROUTINE GYNECOLOGICAL EXAM: Primary | ICD-10-CM

## 2022-04-29 DIAGNOSIS — Z00.00 WELCOME TO MEDICARE PREVENTIVE VISIT: Primary | ICD-10-CM

## 2022-04-29 DIAGNOSIS — Z12.31 BREAST CANCER SCREENING BY MAMMOGRAM: ICD-10-CM

## 2022-04-29 PROCEDURE — 3017F COLORECTAL CA SCREEN DOC REV: CPT | Performed by: PHYSICIAN ASSISTANT

## 2022-04-29 PROCEDURE — G0402 INITIAL PREVENTIVE EXAM: HCPCS | Performed by: PHYSICIAN ASSISTANT

## 2022-04-29 PROCEDURE — 4040F PNEUMOC VAC/ADMIN/RCVD: CPT | Performed by: PHYSICIAN ASSISTANT

## 2022-04-29 PROCEDURE — 1123F ACP DISCUSS/DSCN MKR DOCD: CPT | Performed by: PHYSICIAN ASSISTANT

## 2022-04-29 PROCEDURE — 99397 PER PM REEVAL EST PAT 65+ YR: CPT | Performed by: PHYSICIAN ASSISTANT

## 2022-04-29 ASSESSMENT — PATIENT HEALTH QUESTIONNAIRE - PHQ9
SUM OF ALL RESPONSES TO PHQ9 QUESTIONS 1 & 2: 0
1. LITTLE INTEREST OR PLEASURE IN DOING THINGS: 0
SUM OF ALL RESPONSES TO PHQ QUESTIONS 1-9: 0
2. FEELING DOWN, DEPRESSED OR HOPELESS: 0

## 2022-04-29 ASSESSMENT — LIFESTYLE VARIABLES: HOW OFTEN DO YOU HAVE A DRINK CONTAINING ALCOHOL: NEVER

## 2022-04-29 NOTE — PATIENT INSTRUCTIONS
Personalized Preventive Plan for Wanda Orozcos - 4/29/2022  Medicare offers a range of preventive health benefits. Some of the tests and screenings are paid in full while other may be subject to a deductible, co-insurance, and/or copay. Some of these benefits include a comprehensive review of your medical history including lifestyle, illnesses that may run in your family, and various assessments and screenings as appropriate. After reviewing your medical record and screening and assessments performed today your provider may have ordered immunizations, labs, imaging, and/or referrals for you. A list of these orders (if applicable) as well as your Preventive Care list are included within your After Visit Summary for your review. Other Preventive Recommendations:    · A preventive eye exam performed by an eye specialist is recommended every 1-2 years to screen for glaucoma; cataracts, macular degeneration, and other eye disorders. · A preventive dental visit is recommended every 6 months. · Try to get at least 150 minutes of exercise per week or 10,000 steps per day on a pedometer . · Order or download the FREE \"Exercise & Physical Activity: Your Everyday Guide\" from [employer]. Call 5-838.804.3407 or search [employer] online. · You need 2276-3865 mg of calcium and 2744-9263 IU of vitamin D per day. It is possible to meet your calcium requirement with diet alone, but a vitamin D supplement is usually necessary to meet this goal.  · When exposed to the sun, use a sunscreen that protects against both UVA and UVB radiation with an SPF of 30 or greater. Reapply every 2 to 3 hours or after sweating, drying off with a towel, or swimming. · Always wear a seat belt when traveling in a car. Always wear a helmet when riding a bicycle or motorcycle.

## 2022-04-29 NOTE — PROGRESS NOTES
Medicare Annual Wellness Visit    Nathaniel Cristina is here for Medicare AWV    Assessment & Plan   Welcome to Medicare preventive visit      Recommendations for Preventive Services Due: see orders and patient instructions/AVS.  Recommended screening schedule for the next 5-10 years is provided to the patient in written form: see Patient Instructions/AVS.     Return for Medicare Annual Wellness Visit in 1 year. Subjective   The following acute and/or chronic problems were also addressed today:  See note    Patient's complete Health Risk Assessment and screening values have been reviewed and are found in Flowsheets. The following problems were reviewed today and where indicated follow up appointments were made and/or referrals ordered.     Positive Risk Factor Screenings with Interventions:         Tobacco Use:     Tobacco Use: High Risk    Smoking Tobacco Use: Current Every Day Smoker    Smokeless Tobacco Use: Never Used     E-Cigarettes/Vaping Use     Questions Responses    E-Cigarette/Vaping Use     Start Date     Passive Exposure     Quit Date     Counseling Given     Comments         Substance Use - Tobacco Interventions:  tobacco cessation tips and resources provided           General Health and ACP:  General  In general, how would you say your health is?: Fair  In the past 7 days, have you experienced any of the following: New or Increased Pain, New or Increased Fatigue, Loneliness, Social Isolation, Stress or Anger?: No  Do you get the social and emotional support that you need?: Yes  Do you have a Living Will?: (!) No    Advance Directives     Power of  Living Will ACP-Advance Directive ACP-Power of     Not on File Filed on 07/29/13 Filed Not on File      General Health Risk Interventions:  · No Living Will: Patient declines ACP discussion/assistance    Health Habits/Nutrition:     Physical Activity: Inactive    Days of Exercise per Week: 0 days    Minutes of Exercise per Session: 0 min Have you lost any weight without trying in the past 3 months?: No  Body mass index: (!) 25.57  Have you seen the dentist within the past year?: Yes    Health Habits/Nutrition Interventions:  · Inadequate physical activity:  patient agrees to exercise for at least 150 minutes/week    Hearing/Vision:  Do you or your family notice any trouble with your hearing that hasn't been managed with hearing aids?: (!) Yes  Do you have difficulty driving, watching TV, or doing any of your daily activities because of your eyesight?: No  Have you had an eye exam within the past year?: (!) No  No exam data present    Hearing/Vision Interventions:  · Hearing concerns:  audiology referral provided    Safety:  Do you have working smoke detectors?: Yes  Do you have any tripping hazards - loose or unsecured carpets or rugs?: No  Do you have any tripping hazards - clutter in doorways, halls, or stairs?: No  Do you have either shower bars, grab bars, non-slip mats or non-slip surfaces in your shower or bathtub?: (!) No  Do all of your stairways have a railing or banister?: Yes  Do you always fasten your seatbelt when you are in a car?: Yes    Safety Interventions:  · Home safety tips provided           Objective   Vitals:    04/29/22 1104   BP: 138/60   Pulse: 77   Resp: 18   Temp: 96.9 °F (36.1 °C)   SpO2: 99%   Weight: 149 lb (67.6 kg)   Height: 5' 4\" (1.626 m)      Body mass index is 25.58 kg/m².         General Appearance: alert and oriented to person, place and time, well developed and well- nourished, in no acute distress  Skin: warm and dry, no rash or erythema  Head: normocephalic and atraumatic  Eyes: pupils equal, round, and reactive to light, extraocular eye movements intact, conjunctivae normal  ENT: tympanic membrane, external ear and ear canal normal bilaterally, nose without deformity, nasal mucosa and turbinates normal without polyps  Neck: supple and non-tender without mass, no thyromegaly or thyroid nodules, no cervical lymphadenopathy  Pulmonary/Chest: clear to auscultation bilaterally- no wheezes, rales or rhonchi, normal air movement, no respiratory distress  Cardiovascular: normal rate, regular rhythm, normal S1 and S2, no murmurs, rubs, clicks, or gallops, distal pulses intact, no carotid bruits  Abdomen: soft, non-tender, non-distended, normal bowel sounds, no masses or organomegaly  Extremities: no cyanosis, clubbing or edema  Musculoskeletal: normal range of motion, no joint swelling, deformity or tenderness  Neurologic: reflexes normal and symmetric, no cranial nerve deficit, gait, coordination and speech normal       Allergies   Allergen Reactions    Pcn [Penicillins]      Prior to Visit Medications    Medication Sig Taking? Authorizing Provider   Multiple Vitamins-Minerals (MULTIVITAMIN WITH MINERALS) tablet Take 1 tablet by mouth daily Yes Gustavo Boo PA-C   ibuprofen (ADVIL;MOTRIN) 400 MG tablet Take 1 tablet by mouth every 6 hours as needed for Pain Yes Gustavo Boo PA-C   budesonide (PULMICORT FLEXHALER) 90 MCG/ACT AEPB inhaler Inhale 2 puffs into the lungs 2 times daily Yes Gustavo Boo PA-C   cyclobenzaprine (FLEXERIL) 5 MG tablet Take 1 tablet by mouth nightly as needed for Muscle spasms Yes Gustavo Boo PA-C   fluticasone [facility]) 50 MCG/ACT nasal spray 2 sprays by Nasal route daily Yes Gustavo Boo PA-C   losartan (COZAAR) 25 MG tablet Take 1 tablet by mouth daily Yes Gustavo Boo PA-C   meloxicam (MOBIC) 7.5 MG tablet Take 1 tablet by mouth daily as needed for Pain Yes Gustavo Boo PA-C   albuterol sulfate  (90 Base) MCG/ACT inhaler Inhale 2 puffs into the lungs every 4 hours as needed for Wheezing Yes Gustavo Boo PA-C   allopurinol (ZYLOPRIM) 100 MG tablet TAKE 1 TABLET TWO (2) TIMES A DAY. Yes Gustavo Boo PA-C   atorvastatin (LIPITOR) 40 MG tablet TAKE ONE (1) TABLET DAILY. Yes Gustavo Boo PA-C   atenolol (TENORMIN) 50 MG tablet TAKE ONE (1) TABLET DAILY.  Yes Gustavo Boo PA-C montelukast (SINGULAIR) 10 MG tablet Take 1 tablet by mouth nightly Yes Melvin Lawrence PA-C   ASPIRIN 81 81 MG EC tablet Take 1 tablet by mouth daily Yes Melvin Lawrence PA-C   albuterol (PROVENTIL) (2.5 MG/3ML) 0.083% nebulizer solution INHALE 1 VIAL VIA NEBULIZER EVERY 4 HOURS AS NEEDED FOR WHEEZING Yes Bryan Ibarra DO   meloxicam (MOBIC) 7.5 MG tablet Take 1 tablet by mouth daily as needed for Pain  Bryan Ibarra DO       CareTeam (Including outside providers/suppliers regularly involved in providing care):   Patient Care Team:  Michael Keller as PCP - General (Physician Assistant)  Melvin Lawrence PA-C as PCP - [facility] Empaneled Provider    Reviewed and updated this visit:  Allergies  Meds

## 2022-04-29 NOTE — PROGRESS NOTES
Well woman exam:  Patient is here for her well woman exam.  Her last pap smear was Lisa Bhaktiaser" ago and was normal.  Last mammogram 3/2020. Patient does not have issues with vaginal discharge, itching or Sometimes has an odor. Last LMP late 35s or early 45s. Patient does not have abnormal vaginal bleeding or pink discharge. Patient does not have a family hx of breast, uterine, ovarian or cervical cancers. Patient does not have bowel or bladder problems. She is  sexually active. She has 1 partners and does not use protection. No other complaints or concerns today. Past Medical History:   Diagnosis Date    Asthma     Hypertension       OB History   No obstetric history on file. Family History   Problem Relation Age of Onset    Diabetes Mother     High Blood Pressure Mother     Heart Disease Father      Social History     Socioeconomic History    Marital status:      Spouse name: Not on file    Number of children: Not on file    Years of education: Not on file    Highest education level: Not on file   Occupational History    Not on file   Tobacco Use    Smoking status: Current Every Day Smoker     Packs/day: 0.50     Years: 20.00     Pack years: 10.00     Types: Cigarettes     Start date: 7/8/1999    Smokeless tobacco: Never Used   Substance and Sexual Activity    Alcohol use: No    Drug use: No    Sexual activity: Yes     Partners: Male   Other Topics Concern    Not on file   Social History Narrative    Not on file     Social Determinants of Health     Financial Resource Strain: Low Risk     Difficulty of Paying Living Expenses: Not hard at all   Food Insecurity: No Food Insecurity    Worried About [address] in the Last Year: Never true    [address] in the Last Year: Never true   Transportation Needs:     Lack of Transportation (Medical): Not on file    Lack of Transportation (Non-Medical):  Not on file   Physical Activity: Inactive    Days of Exercise per Week: 0 days    Minutes of Exercise per Session: 0 min   Stress:     Feeling of Stress : Not on file   Social Connections:     Frequency of Communication with Friends and Family: Not on file    Frequency of Social Gatherings with Friends and Family: Not on file    Attends Restorationist Services: Not on file    Active Member of [address] [place] [address] or Organizations: Not on file    Attends Club or Organization Meetings: Not on file    Marital Status: Not on file   Intimate Partner Violence:     Fear of Current or Ex-Partner: Not on file    Emotionally Abused: Not on file    Physically Abused: Not on file    Sexually Abused: Not on file   Housing Stability:     Unable to Pay for Housing in the Last Year: Not on file    Number of [place] in the Last Year: Not on file    Unstable Housing in the Last Year: Not on file       Current Outpatient Medications:     Multiple Vitamins-Minerals (MULTIVITAMIN WITH MINERALS) tablet, Take 1 tablet by mouth daily, Disp: 30 tablet, Rfl: 5    ibuprofen (ADVIL;MOTRIN) 400 MG tablet, Take 1 tablet by mouth every 6 hours as needed for Pain, Disp: 30 tablet, Rfl: 0    budesonide (PULMICORT FLEXHALER) 90 MCG/ACT AEPB inhaler, Inhale 2 puffs into the lungs 2 times daily, Disp: 1 each, Rfl: 1    cyclobenzaprine (FLEXERIL) 5 MG tablet, Take 1 tablet by mouth nightly as needed for Muscle spasms, Disp: 30 tablet, Rfl: 1    fluticasone (FLONASE) 50 MCG/ACT nasal spray, 2 sprays by Nasal route daily, Disp: 1 each, Rfl: 5    losartan (COZAAR) 25 MG tablet, Take 1 tablet by mouth daily, Disp: 30 tablet, Rfl: 5    meloxicam (MOBIC) 7.5 MG tablet, Take 1 tablet by mouth daily as needed for Pain, Disp: 30 tablet, Rfl: 5    albuterol sulfate  (90 Base) MCG/ACT inhaler, Inhale 2 puffs into the lungs every 4 hours as needed for Wheezing, Disp: 18 g, Rfl: 5    allopurinol (ZYLOPRIM) 100 MG tablet, TAKE 1 TABLET TWO (2) TIMES A DAY., Disp: 60 tablet, Rfl: 1    atorvastatin (LIPITOR) 40 MG tablet, TAKE ONE (1) TABLET DAILY. , Disp: 30 tablet, Rfl: 5    atenolol (TENORMIN) 50 MG tablet, TAKE ONE (1) TABLET DAILY. , Disp: 30 tablet, Rfl: 5    montelukast (SINGULAIR) 10 MG tablet, Take 1 tablet by mouth nightly, Disp: 30 tablet, Rfl: 1    ASPIRIN 81 81 MG EC tablet, Take 1 tablet by mouth daily, Disp: 30 tablet, Rfl: 1    albuterol (PROVENTIL) (2.5 MG/3ML) 0.083% nebulizer solution, INHALE 1 VIAL VIA NEBULIZER EVERY 4 HOURS AS NEEDED FOR WHEEZING, Disp: 360 mL, Rfl: 10  Allergies   Allergen Reactions    Pcn [Penicillins]         Physical Exam:  Vitals:    04/29/22 1114   BP: 138/60   Pulse: 77   Resp: 18   Temp: 96.9 °F (36.1 °C)   SpO2: 99%   Weight: 149 lb (67.6 kg)   Height: 5' 4\" (1.626 m)     General:  Patient alert and oriented x 3, NAD, pleasant  HEENT:  Atraumatic, normocephalic, PERRLA, EOMI, clear conjunctiva, TMs clear, nose-clear, throat - no erythema  Neck:  Supple, no goiter, no carotid bruits, no LAD  Lungs:  CTA B  Heart:  RRR, no murmurs, gallops or rubs  Abdomen:  Soft, NTND, + bowel sounds  Extremities:  No clubbing, cyanosis or edema  Neuro:  CN II-XII grossly intact, 5/5 strength in bilateral upper and lower extremities, 2 + reflexes. Breast:  No skin dimpling or erythema, no discrete masses or lumps, no nipple discharge, no axillary lymphadenopathy  Female :    External exam:  Labia with no visible lesions/masses,  vagina with no abnormal  discharge, odor or lesions    Internal exam:  Cervix- pink, no lesions, no cervical motion tenderness, uterus - normal in size, ovaries - not palpable    Assessment/Plan:  Adriano Dai was seen today for gynecologic exam.    Diagnoses and all orders for this visit:    Well woman exam with routine gynecological exam    Cervical cancer screening    Breast cancer screening by mammogram  -     EM DIGITAL SCREEN BILATERAL PER PROTOCOL; Future        As above.   Call or go to ED immediately if symptoms worsen or persist.  Return in about 1 year (around 4/29/2023). , or sooner if necessary. Educational materials and/or home exercises printed for patient's review and were included in patient instructions on his/her After Visit Summary and given to patient at the end of visit. Counseled regarding above diagnosis, including possible risks and complications,  especially if left uncontrolled. Counseled regarding the possible side effects, risks, benefits and alternatives to treatment; patient and/or guardian verbalizes understanding, agrees, feels comfortable with and wishes to proceed with above treatment plan. Advised patient to call with any new medication issues, and read all Rx info from pharmacy to assure aware of all possible risks and side effects of medication before taking. Reviewed age and gender appropriate health screening exams and vaccinations. Advised patient regarding importance of keeping up with recommended health maintenance and to schedule as soon as possible if overdue, as this is important in assessing for undiagnosed pathology, especially cancer, as well as protecting against potentially harmful/life threatening disease. Patient and/or guardian verbalizes understanding and agrees with above counseling, assessment and plan. All questions answered. Shagufta Prescott PA-C  4/29/2022    I have personally reviewed and updated the chief complaint, HPI, Past Medical, Family and Social History, as well as the above Review of Systems.

## 2022-05-06 DIAGNOSIS — R87.613 HIGH GRADE SQUAMOUS INTRAEPITHELIAL CERVICAL DYSPLASIA: Primary | ICD-10-CM

## 2022-05-10 ENCOUNTER — TELEPHONE (OUTPATIENT)
Dept: PRIMARY CARE CLINIC | Age: 67
End: 2022-05-10

## 2022-05-10 NOTE — TELEPHONE ENCOUNTER
----- Message from Lennox Sullivan sent at 5/10/2022  1:37 PM EDT -----  Subject: Medication Problem    QUESTIONS  Name of Medication? losartan (COZAAR) 25 MG tablet  Patient-reported dosage and instructions? 25 MG take once a day   What question or problem do you have with the medication? Patient is   calling in and she stated that this medication is making her feel sick to   her stomach, she has been vomitting. She did stop taking this about 3 days   ago. She did start them on 04/27/2022. Preferred Pharmacy? [address], [address]   thePlatform phone number (if available)? [phone]  Additional Information for Provider? She did state that since she stopped   taking this medication she can eat now and her stomach is not cramping.   ---------------------------------------------------------------------------  --------------  CALL BACK INFO  What is the best way for the office to contact you? OK to leave message on   voicemail  Preferred Call Back Phone Number? [phone]  ---------------------------------------------------------------------------  --------------  SCRIPT ANSWERS  Relationship to Patient?  Self

## 2022-05-13 ENCOUNTER — OFFICE VISIT (OUTPATIENT)
Dept: PRIMARY CARE CLINIC | Age: 67
End: 2022-05-13
Payer: MEDICARE

## 2022-05-13 VITALS
OXYGEN SATURATION: 100 % | DIASTOLIC BLOOD PRESSURE: 62 MMHG | HEART RATE: 90 BPM | WEIGHT: 152 LBS | HEIGHT: 64 IN | SYSTOLIC BLOOD PRESSURE: 170 MMHG | RESPIRATION RATE: 18 BRPM | TEMPERATURE: 97 F | BODY MASS INDEX: 25.95 KG/M2

## 2022-05-13 DIAGNOSIS — R06.02 SHORTNESS OF BREATH: ICD-10-CM

## 2022-05-13 DIAGNOSIS — I10 PRIMARY HYPERTENSION: ICD-10-CM

## 2022-05-13 DIAGNOSIS — R60.0 BILATERAL LEG EDEMA: ICD-10-CM

## 2022-05-13 DIAGNOSIS — I35.0 SEVERE AORTIC STENOSIS: Primary | ICD-10-CM

## 2022-05-13 PROCEDURE — 99214 OFFICE O/P EST MOD 30 MIN: CPT | Performed by: PHYSICIAN ASSISTANT

## 2022-05-13 PROCEDURE — G8417 CALC BMI ABV UP PARAM F/U: HCPCS | Performed by: PHYSICIAN ASSISTANT

## 2022-05-13 PROCEDURE — G8427 DOCREV CUR MEDS BY ELIG CLIN: HCPCS | Performed by: PHYSICIAN ASSISTANT

## 2022-05-13 PROCEDURE — 1090F PRES/ABSN URINE INCON ASSESS: CPT | Performed by: PHYSICIAN ASSISTANT

## 2022-05-13 PROCEDURE — 3017F COLORECTAL CA SCREEN DOC REV: CPT | Performed by: PHYSICIAN ASSISTANT

## 2022-05-13 PROCEDURE — 4004F PT TOBACCO SCREEN RCVD TLK: CPT | Performed by: PHYSICIAN ASSISTANT

## 2022-05-13 PROCEDURE — G8400 PT W/DXA NO RESULTS DOC: HCPCS | Performed by: PHYSICIAN ASSISTANT

## 2022-05-13 PROCEDURE — 4040F PNEUMOC VAC/ADMIN/RCVD: CPT | Performed by: PHYSICIAN ASSISTANT

## 2022-05-13 PROCEDURE — 1123F ACP DISCUSS/DSCN MKR DOCD: CPT | Performed by: PHYSICIAN ASSISTANT

## 2022-05-13 NOTE — LETTER
[address] [facility],[address]  [address] [address]  Phone: [phone]  Fax: [phone]    Khushboo Mcclain        May 13, 2022     Patient: Deming Flow   YOB: 1955   Date of Visit: 5/13/2022       To Whom It May Concern: It is my medical opinion that Deming Flow should remain out of work until 5/18/22. If you have any questions or concerns, please don't hesitate to call.     Sincerely,        Deidre Greenberg PA-C

## 2022-05-13 NOTE — PROGRESS NOTES
Hypertension:  Patient is here for follow up chronic hypertension. This is not generally controlled on current medication regimen. She stopped her losartan bc it made her nauseated. Most recent labs reviewed with patient and are remarkable, cr 1.3, gfr 50. symptoms from htn standpoint include le edema. Patient is  compliant with lifestyle modifications. Patient does smoke. Comorbid conditions include severe AS. She was not yet contacted by CTS and I placed the referral about 1 month ago. She denies dizziness or syncope. She is having more sob. She is having more fatigue. Patient's past medical, surgical, social and/or family history reviewed, updated in chart, and are non-contributory (unless otherwise stated). Medications and allergies also reviewed and updated in chart. Review of Systems:  Constitutional:  No fever, + fatigue, no chills, no headaches, no weight change  Dermatology:  No rash, no mole, no dry or sensitive skin  ENT:  No cough, no sore throat, no sinus pain, no runny nose, no ear pain  Cardiology:  No chest pain, no palpitations, + leg edema, +shortness of breath, no PND  Gastroenterology:  No dysphagia, no abdominal pain, no nausea, no vomiting, no constipation, no diarrhea, no heartburn  Musculoskeletal:  No joint pain, no leg cramps, no back pain, no muscle aches  Respiratory:  No shortness of breath, no orthopnea, no wheezing, no ALMEIDA, no hemoptysis  Urology:  No blood in the urine, no urinary frequency, no urinary incontinence, no urinary urgency, no nocturia, no dysuria    Vitals:    05/13/22 1505 05/13/22 1511   BP: (!) 170/60 (!) 170/62   Pulse: 90    Resp: 18    Temp: 97 °F (36.1 °C)    SpO2: 100%    Weight: 152 lb (68.9 kg)    Height: 5' 4\" (1.626 m)        Physical Exam  Constitutional:       General: She is not in acute distress. Appearance: She is well-developed. HENT:      Head: Normocephalic and atraumatic.       Right Ear: External ear normal.      Left Ear: External ear normal.      Nose: Nose normal.   Eyes:      General: No scleral icterus. Conjunctiva/sclera: Conjunctivae normal.      Pupils: Pupils are equal, round, and reactive to light. Neck:      Thyroid: No thyromegaly. Cardiovascular:      Rate and Rhythm: Normal rate and regular rhythm. Heart sounds: Murmur (3/6 systolic) heard. Pulmonary:      Effort: Pulmonary effort is normal. No accessory muscle usage or respiratory distress. Breath sounds: Normal breath sounds. No wheezing. Musculoskeletal:         General: Normal range of motion. Cervical back: Normal range of motion and neck supple. Right lower leg: Edema (trace) present. Left lower leg: Edema (1+) present. Skin:     General: Skin is warm and dry. Findings: No rash. Neurological:      Mental Status: She is alert and oriented to person, place, and time. Deep Tendon Reflexes: Reflexes are normal and symmetric. Psychiatric:         Speech: Speech normal.         Behavior: Behavior normal.         Assessment/Plan:      Cely Rivera was seen today for leg swelling and fatigue. Diagnoses and all orders for this visit:    Severe aortic stenosis  -     Handicap Placard MISC; by Does not apply route Patient cannot walk 200 ft without stopping to rest.    Expiration 5 Jenni Hardwick MD, Cardiology, [place]  -     metoprolol tartrate (LOPRESSOR) 25 MG tablet; Take 1 tablet by mouth 2 times daily  -pt d/c losartan on her own bc of nausea. -d/c atenolol    Shortness of breath  -     Handicap Placard MISC; by Does not apply route Patient cannot walk 200 ft without stopping to rest.    Expiration 5 Jenni Hardwick MD, Cardiology, [place]    Primary hypertension  -     metoprolol tartrate (LOPRESSOR) 25 MG tablet; Take 1 tablet by mouth 2 times daily    Bilateral leg edema    will try to add another acei or arb on Monday. She will check bp over the weekend.  I wanted her to get an u/s today of her leg. She could not go bc her grand daughter has prom and she wants to be there. She promises to go to the ED if swelling, pain or sob worsens. As above. Call or go to ED immediately if symptoms worsen or persist.  Return in about 3 days (around 5/16/2022). , or sooner if necessary. Educational materials and/or home exercises printed for patient's review and were included in patient instructions on his/her After Visit Summary and given to patient at the end of visit. Counseled regarding above diagnosis, including possible risks and complications,  especially if left uncontrolled. Counseled regarding the possible side effects, risks, benefits and alternatives to treatment; patient and/or guardian verbalizes understanding, agrees, feels comfortable with and wishes to proceed with above treatment plan. Advised patient to call with any new medication issues, and read all Rx info from pharmacy to assure aware of all possible risks and side effects of medication before taking. Reviewed age and gender appropriate health screening exams and vaccinations. Advised patient regarding importance of keeping up with recommended health maintenance and to schedule as soon as possible if overdue, as this is important in assessing for undiagnosed pathology, especially cancer, as well as protecting against potentially harmful/life threatening disease. Patient and/or guardian verbalizes understanding and agrees with above counseling, assessment and plan. All questions answered. Preeti Austin PA-C  5/13/2022    I have personally reviewed and updated the chief complaint, HPI, Past Medical, Family and Social History, as well as the above Review of Systems.

## 2022-05-16 ENCOUNTER — OFFICE VISIT (OUTPATIENT)
Dept: PRIMARY CARE CLINIC | Age: 67
End: 2022-05-16
Payer: MEDICARE

## 2022-05-16 ENCOUNTER — TELEPHONE (OUTPATIENT)
Dept: CARDIOLOGY | Age: 67
End: 2022-05-16

## 2022-05-16 ENCOUNTER — TELEPHONE (OUTPATIENT)
Dept: CARDIOLOGY CLINIC | Age: 67
End: 2022-05-16

## 2022-05-16 VITALS
SYSTOLIC BLOOD PRESSURE: 150 MMHG | BODY MASS INDEX: 26.12 KG/M2 | WEIGHT: 153 LBS | RESPIRATION RATE: 16 BRPM | OXYGEN SATURATION: 99 % | DIASTOLIC BLOOD PRESSURE: 70 MMHG | TEMPERATURE: 96.8 F | HEART RATE: 88 BPM | HEIGHT: 64 IN

## 2022-05-16 DIAGNOSIS — I35.0 SEVERE AORTIC STENOSIS: ICD-10-CM

## 2022-05-16 DIAGNOSIS — I10 PRIMARY HYPERTENSION: ICD-10-CM

## 2022-05-16 PROCEDURE — 99214 OFFICE O/P EST MOD 30 MIN: CPT | Performed by: PHYSICIAN ASSISTANT

## 2022-05-16 NOTE — PROGRESS NOTES
Chief Complaint   Patient presents with    Hypertension     bp check       HPI:  Patient is here for follow-up of htn. She is using the bblocker. She has tolerated it well over the weekend. She has been off of the cozaar. She got a call from Cardiology, but she was unhappy with the staff. She was concerned with this and was hoping to get scheduled elsewhere. I called Miracle Olivarez at the valve clinic, discussed her case, and she will make sure that she is set up for everything that she needs before seeing CT surgery again. I then discussed with Ms Vivian Bullard that she will likely need a MICHAEL and cath initially. With the severity of her valve dz, we need to expedite this. She is agreeable. Patient's past medical, surgical, social and/or family history reviewed, updated in chart, and are non-contributory (unless otherwise stated). Medications and allergies also reviewed and updated in chart. Review of Systems:  Constitutional:  No fever, no fatigue, no chills, no headaches, no weight change  Dermatology:  No rash, no mole, no dry or sensitive skin  ENT:  No cough, no sore throat, no sinus pain, no runny nose, no ear pain  Cardiology:  No chest pain, no palpitations, no leg edema, + shortness of breath, no PND  Gastroenterology:  No dysphagia, no abdominal pain, no nausea, no vomiting, no constipation, no diarrhea, no heartburn  Musculoskeletal:  No joint pain, no leg cramps, no back pain, no muscle aches  Respiratory:  No shortness of breath, no orthopnea, no wheezing, no ALMEIDA, no hemoptysis  Urology:  No blood in the urine, no urinary frequency, no urinary incontinence, no urinary urgency, no nocturia, no dysuria    Vitals:    05/16/22 1221 05/16/22 1224 05/16/22 1308   BP: (!) 160/68 (!) 172/64 (!) 150/70   Pulse: 88     Resp: 16     Temp: 96.8 °F (36 °C)     SpO2: 99%     Weight: 153 lb (69.4 kg)     Height: 5' 4\" (1.626 m)         Physical Exam  Constitutional:       General: She is not in acute distress. Appearance: She is well-developed. HENT:      Head: Normocephalic and atraumatic. Right Ear: External ear normal.      Left Ear: External ear normal.      Nose: Nose normal.   Eyes:      General: No scleral icterus. Conjunctiva/sclera: Conjunctivae normal.      Pupils: Pupils are equal, round, and reactive to light. Neck:      Thyroid: No thyromegaly. Cardiovascular:      Rate and Rhythm: Normal rate and regular rhythm. Heart sounds: Murmur heard. Pulmonary:      Effort: Pulmonary effort is normal. No accessory muscle usage or respiratory distress. Breath sounds: Normal breath sounds. No wheezing. Musculoskeletal:         General: Normal range of motion. Cervical back: Normal range of motion and neck supple. Right lower leg: No edema. Left lower leg: No edema. Skin:     General: Skin is warm and dry. Findings: No rash. Neurological:      Mental Status: She is alert and oriented to person, place, and time. Deep Tendon Reflexes: Reflexes are normal and symmetric. Psychiatric:         Speech: Speech normal.         Behavior: Behavior normal.         Assessment/Plan:      Higinio Trujillo was seen today for hypertension. Diagnoses and all orders for this visit:    Severe aortic stenosis  -     metoprolol tartrate (LOPRESSOR) 25 MG tablet; Take 2 tablets by mouth 2 times daily  - as above, increased bblocker, add acei next visit    Primary hypertension  -     metoprolol tartrate (LOPRESSOR) 25 MG tablet; Take 2 tablets by mouth 2 times daily      As above. Call or go to ED immediately if symptoms worsen or persist.  Return in about 4 days (around 5/20/2022). , or sooner if necessary. Educational materials and/or home exercises printed for patient's review and were included in patient instructions on his/her After Visit Summary and given to patient at the end of visit.       Counseled regarding above diagnosis, including possible risks and complications, especially if left uncontrolled. Counseled regarding the possible side effects, risks, benefits and alternatives to treatment; patient and/or guardian verbalizes understanding, agrees, feels comfortable with and wishes to proceed with above treatment plan. Advised patient to call with any new medication issues, and read all Rx info from pharmacy to assure aware of all possible risks and side effects of medication before taking. Reviewed age and gender appropriate health screening exams and vaccinations. Advised patient regarding importance of keeping up with recommended health maintenance and to schedule as soon as possible if overdue, as this is important in assessing for undiagnosed pathology, especially cancer, as well as protecting against potentially harmful/life threatening disease. Patient and/or guardian verbalizes understanding and agrees with above counseling, assessment and plan. All questions answered. Bebe Mckinley PA-C  5/16/2022    I have personally reviewed and updated the chief complaint, HPI, Past Medical, Family and Social History, as well as the above Review of Systems.

## 2022-05-17 NOTE — TELEPHONE ENCOUNTER
I spoke with pt today, she preferred to schedule in June. I have scheduled pt on 6/16/22. She preferred to receive instructions for MICHAEL/LHC by email which was done.

## 2022-05-18 ENCOUNTER — OFFICE VISIT (OUTPATIENT)
Dept: PRIMARY CARE CLINIC | Age: 67
End: 2022-05-18
Payer: MEDICARE

## 2022-05-18 VITALS
BODY MASS INDEX: 25.37 KG/M2 | SYSTOLIC BLOOD PRESSURE: 160 MMHG | OXYGEN SATURATION: 97 % | HEIGHT: 65 IN | TEMPERATURE: 96.6 F | RESPIRATION RATE: 22 BRPM | HEART RATE: 78 BPM | WEIGHT: 152.3 LBS | DIASTOLIC BLOOD PRESSURE: 68 MMHG

## 2022-05-18 DIAGNOSIS — I10 PRIMARY HYPERTENSION: Primary | ICD-10-CM

## 2022-05-18 DIAGNOSIS — I35.0 SEVERE AORTIC STENOSIS: ICD-10-CM

## 2022-05-18 DIAGNOSIS — Z72.0 TOBACCO ABUSE: ICD-10-CM

## 2022-05-18 PROCEDURE — 99214 OFFICE O/P EST MOD 30 MIN: CPT | Performed by: PHYSICIAN ASSISTANT

## 2022-05-18 RX ORDER — LISINOPRIL 10 MG/1
10 TABLET ORAL DAILY
Qty: 30 TABLET | Refills: 5 | Status: ON HOLD
Start: 2022-05-18 | End: 2022-05-26 | Stop reason: HOSPADM

## 2022-05-18 NOTE — PROGRESS NOTES
Hypertension:  Patient is here for follow up chronic hypertension. This is not generally controlled on current medication regimen. Takes meds as directed and tolerates them well. Most recent labs reviewed with patient and are not remarkable. No symptoms from htn standpoint per ROS. Patient is not compliant with lifestyle modifications. Patient does smoke. Comorbid conditions include severe as. She has spoke to scheduling and has a shahram/cath scheduled in June. She promises not to \"chicken out this time. \"     Patient's past medical, surgical, social and/or family history reviewed, updated in chart, and are non-contributory (unless otherwise stated). Medications and allergies also reviewed and updated in chart. Review of Systems:  Constitutional:  No fever, no fatigue, no chills, no headaches, no weight change  Dermatology:  No rash, no mole, no dry or sensitive skin  ENT:  No cough, no sore throat, no sinus pain, no runny nose, no ear pain  Cardiology:  No chest pain, no palpitations, no leg edema, no shortness of breath, no PND  Gastroenterology:  No dysphagia, no abdominal pain, no nausea, no vomiting, no constipation, no diarrhea, no heartburn  Musculoskeletal:  No joint pain, no leg cramps, no back pain, no muscle aches  Respiratory:  No shortness of breath, no orthopnea, no wheezing, no ALMEIDA, no hemoptysis  Urology:  No blood in the urine, no urinary frequency, no urinary incontinence, no urinary urgency, no nocturia, no dysuria    Vitals:    05/18/22 1030 05/18/22 1033   BP: (!) 152/72 (!) 160/68   Site: Right Upper Arm Right Upper Arm   Position: Sitting Sitting   Cuff Size: Medium Adult Medium Adult   Pulse: 78    Resp: 22    Temp: 96.6 °F (35.9 °C)    TempSrc: Temporal    SpO2: 97%    Weight: 152 lb 4.8 oz (69.1 kg)    Height: 5' 5\" (1.651 m)        Physical Exam  Constitutional:       General: She is not in acute distress. Appearance: She is well-developed.    HENT:      Head: Normocephalic and atraumatic. Right Ear: External ear normal.      Left Ear: External ear normal.      Nose: Nose normal.   Eyes:      General: No scleral icterus. Conjunctiva/sclera: Conjunctivae normal.      Pupils: Pupils are equal, round, and reactive to light. Neck:      Thyroid: No thyromegaly. Cardiovascular:      Rate and Rhythm: Normal rate and regular rhythm. Heart sounds: Murmur (3/6 systolic) heard. Pulmonary:      Effort: Pulmonary effort is normal. No accessory muscle usage or respiratory distress. Breath sounds: Normal breath sounds. No wheezing. Musculoskeletal:         General: Normal range of motion. Cervical back: Normal range of motion and neck supple. Right lower leg: No edema. Left lower leg: No edema. Skin:     General: Skin is warm and dry. Findings: No rash. Neurological:      Mental Status: She is alert and oriented to person, place, and time. Deep Tendon Reflexes: Reflexes are normal and symmetric. Psychiatric:         Speech: Speech normal.         Behavior: Behavior normal.         Assessment/Plan:      Annika Lauren was seen today for hypertension. Diagnoses and all orders for this visit:    Primary hypertension  -     lisinopril (PRINIVIL;ZESTRIL) 10 MG tablet; Take 1 tablet by mouth daily  -improved with increased bblocker, add acei    Severe aortic stenosis  -     lisinopril (PRINIVIL;ZESTRIL) 10 MG tablet; Take 1 tablet by mouth daily  - has shahram/cath scheduled in June    Tobacco abuse  -encouraged again to quit smoking    As above. Call or go to ED immediately if symptoms worsen or persist.  Return in about 2 weeks (around 6/1/2022). , or sooner if necessary. Educational materials and/or home exercises printed for patient's review and were included in patient instructions on his/her After Visit Summary and given to patient at the end of visit.       Counseled regarding above diagnosis, including possible risks and complications,  especially if left uncontrolled. Counseled regarding the possible side effects, risks, benefits and alternatives to treatment; patient and/or guardian verbalizes understanding, agrees, feels comfortable with and wishes to proceed with above treatment plan. Advised patient to call with any new medication issues, and read all Rx info from pharmacy to assure aware of all possible risks and side effects of medication before taking. Reviewed age and gender appropriate health screening exams and vaccinations. Advised patient regarding importance of keeping up with recommended health maintenance and to schedule as soon as possible if overdue, as this is important in assessing for undiagnosed pathology, especially cancer, as well as protecting against potentially harmful/life threatening disease. Patient and/or guardian verbalizes understanding and agrees with above counseling, assessment and plan. All questions answered. Claude Valderrama PA-C  5/18/2022    I have personally reviewed and updated the chief complaint, HPI, Past Medical, Family and Social History, as well as the above Review of Systems.

## 2022-05-23 ENCOUNTER — HOSPITAL ENCOUNTER (OUTPATIENT)
Dept: ULTRASOUND IMAGING | Age: 67
Discharge: HOME | End: 2022-05-25
Payer: MEDICARE

## 2022-05-23 ENCOUNTER — HOSPITAL ENCOUNTER (OUTPATIENT)
Age: 67
Discharge: HOME | End: 2022-05-25
Payer: MEDICARE

## 2022-05-23 ENCOUNTER — TELEPHONE (OUTPATIENT)
Dept: PRIMARY CARE CLINIC | Age: 67
End: 2022-05-23

## 2022-05-23 DIAGNOSIS — M79.89 LEFT LEG SWELLING: ICD-10-CM

## 2022-05-23 DIAGNOSIS — E78.00 PURE HYPERCHOLESTEROLEMIA: ICD-10-CM

## 2022-05-23 DIAGNOSIS — M79.89 LEFT LEG SWELLING: Primary | ICD-10-CM

## 2022-05-23 PROCEDURE — 93971 EXTREMITY STUDY: CPT

## 2022-05-23 RX ORDER — ATORVASTATIN CALCIUM 40 MG/1
TABLET, FILM COATED ORAL
Qty: 30 TABLET | Refills: 5 | Status: SHIPPED
Start: 2022-05-23 | End: 2022-05-24

## 2022-05-23 RX ORDER — BUDESONIDE 90 UG/1
AEROSOL, POWDER RESPIRATORY (INHALATION)
Qty: 1 EACH | Refills: 5 | Status: SHIPPED
Start: 2022-05-23 | End: 2022-05-24

## 2022-05-23 NOTE — TELEPHONE ENCOUNTER
----- Message from Pavel Ray sent at 5/23/2022  3:28 PM EDT -----  Subject: Message to Provider    QUESTIONS  Information for Provider? pt is calling wanting to know if she should   still take the same amount of BP medications and should she go back work.   ---------------------------------------------------------------------------  --------------  [address]  What is the best way for the office to contact you? OK to leave message on   voicemail  Preferred Call Back Phone Number? [phone]  ---------------------------------------------------------------------------  --------------  SCRIPT ANSWERS  Relationship to Patient?  Self

## 2022-05-23 NOTE — TELEPHONE ENCOUNTER
I spoke with the patient, she is unable to go for her US of lower extremity until later this afternoon. Advised patient to go to [address] [place] [address] today per PHYLLIS Zarate. Araceli Morales agreed to go today.

## 2022-05-23 NOTE — TELEPHONE ENCOUNTER
Patient called on nurse triage line, her left leg is still swelling, she is c/o of difficulty walking,  no change with medication recently given.   Please advise

## 2022-05-24 ENCOUNTER — HOSPITAL ENCOUNTER (INPATIENT)
Age: 67
LOS: 2 days | Discharge: HOME | DRG: 291 | End: 2022-05-26
Attending: EMERGENCY MEDICINE | Admitting: INTERNAL MEDICINE
Payer: MEDICARE

## 2022-05-24 ENCOUNTER — APPOINTMENT (OUTPATIENT)
Dept: GENERAL RADIOLOGY | Age: 67
DRG: 291 | End: 2022-05-24
Payer: MEDICARE

## 2022-05-24 DIAGNOSIS — I50.41 ACUTE COMBINED SYSTOLIC AND DIASTOLIC CONGESTIVE HEART FAILURE (HCC): ICD-10-CM

## 2022-05-24 DIAGNOSIS — I10 ESSENTIAL HYPERTENSION: ICD-10-CM

## 2022-05-24 DIAGNOSIS — I50.9 ACUTE DECOMPENSATED HEART FAILURE (HCC): Primary | ICD-10-CM

## 2022-05-24 LAB
ALBUMIN SERPL-MCNC: 3.1 G/DL (ref 3.5–5.2)
ALP BLD-CCNC: 79 U/L (ref 35–104)
ALT SERPL-CCNC: 101 U/L (ref 0–32)
ANION GAP SERPL CALCULATED.3IONS-SCNC: 8 MMOL/L (ref 7–16)
AST SERPL-CCNC: 35 U/L (ref 0–31)
BASOPHILS ABSOLUTE: 0.03 E9/L (ref 0–0.2)
BASOPHILS RELATIVE PERCENT: 0.4 % (ref 0–2)
BILIRUB SERPL-MCNC: 0.7 MG/DL (ref 0–1.2)
BUN BLDV-MCNC: 18 MG/DL (ref 6–23)
CALCIUM SERPL-MCNC: 8.7 MG/DL (ref 8.6–10.2)
CHLORIDE BLD-SCNC: 107 MMOL/L (ref 98–107)
CO2: 23 MMOL/L (ref 22–29)
CREAT SERPL-MCNC: 1.4 MG/DL (ref 0.5–1)
EKG ATRIAL RATE: 75 BPM
EKG P AXIS: 65 DEGREES
EKG P-R INTERVAL: 142 MS
EKG Q-T INTERVAL: 380 MS
EKG QRS DURATION: 92 MS
EKG QTC CALCULATION (BAZETT): 424 MS
EKG R AXIS: 78 DEGREES
EKG T AXIS: -163 DEGREES
EKG VENTRICULAR RATE: 75 BPM
EOSINOPHILS ABSOLUTE: 0.03 E9/L (ref 0.05–0.5)
EOSINOPHILS RELATIVE PERCENT: 0.4 % (ref 0–6)
GFR AFRICAN AMERICAN: 46
GFR NON-AFRICAN AMERICAN: 46 ML/MIN/1.73
GLUCOSE BLD-MCNC: 75 MG/DL (ref 74–99)
HCT VFR BLD CALC: 40.7 % (ref 34–48)
HEMOGLOBIN: 13 G/DL (ref 11.5–15.5)
IMMATURE GRANULOCYTES #: 0.02 E9/L
IMMATURE GRANULOCYTES %: 0.3 % (ref 0–5)
LYMPHOCYTES ABSOLUTE: 2.04 E9/L (ref 1.5–4)
LYMPHOCYTES RELATIVE PERCENT: 26.3 % (ref 20–42)
MCH RBC QN AUTO: 31 PG (ref 26–35)
MCHC RBC AUTO-ENTMCNC: 31.9 % (ref 32–34.5)
MCV RBC AUTO: 97.1 FL (ref 80–99.9)
MONOCYTES ABSOLUTE: 0.8 E9/L (ref 0.1–0.95)
MONOCYTES RELATIVE PERCENT: 10.3 % (ref 2–12)
NEUTROPHILS ABSOLUTE: 4.84 E9/L (ref 1.8–7.3)
NEUTROPHILS RELATIVE PERCENT: 62.3 % (ref 43–80)
PDW BLD-RTO: 14.6 FL (ref 11.5–15)
PLATELET # BLD: 181 E9/L (ref 130–450)
PMV BLD AUTO: 11.5 FL (ref 7–12)
POTASSIUM REFLEX MAGNESIUM: 4.1 MMOL/L (ref 3.5–5)
PRO-BNP: ABNORMAL PG/ML (ref 0–125)
RBC # BLD: 4.19 E12/L (ref 3.5–5.5)
SODIUM BLD-SCNC: 138 MMOL/L (ref 132–146)
TOTAL PROTEIN: 6 G/DL (ref 6.4–8.3)
TROPONIN, HIGH SENSITIVITY: 56 NG/L (ref 0–9)
TROPONIN, HIGH SENSITIVITY: 58 NG/L (ref 0–9)
WBC # BLD: 7.8 E9/L (ref 4.5–11.5)

## 2022-05-24 PROCEDURE — 36415 COLL VENOUS BLD VENIPUNCTURE: CPT

## 2022-05-24 PROCEDURE — 83880 ASSAY OF NATRIURETIC PEPTIDE: CPT

## 2022-05-24 PROCEDURE — 2580000003 HC RX 258: Performed by: INTERNAL MEDICINE

## 2022-05-24 PROCEDURE — 6370000000 HC RX 637 (ALT 250 FOR IP): Performed by: INTERNAL MEDICINE

## 2022-05-24 PROCEDURE — APPSS60 APP SPLIT SHARED TIME 46-60 MINUTES

## 2022-05-24 PROCEDURE — 85025 COMPLETE CBC W/AUTO DIFF WBC: CPT

## 2022-05-24 PROCEDURE — 6360000002 HC RX W HCPCS: Performed by: INTERNAL MEDICINE

## 2022-05-24 PROCEDURE — 71045 X-RAY EXAM CHEST 1 VIEW: CPT

## 2022-05-24 PROCEDURE — 99223 1ST HOSP IP/OBS HIGH 75: CPT | Performed by: INTERNAL MEDICINE

## 2022-05-24 PROCEDURE — 6370000000 HC RX 637 (ALT 250 FOR IP): Performed by: EMERGENCY MEDICINE

## 2022-05-24 PROCEDURE — 2060000000 HC ICU INTERMEDIATE R&B

## 2022-05-24 PROCEDURE — 6360000002 HC RX W HCPCS: Performed by: EMERGENCY MEDICINE

## 2022-05-24 PROCEDURE — 96376 TX/PRO/DX INJ SAME DRUG ADON: CPT

## 2022-05-24 PROCEDURE — 93005 ELECTROCARDIOGRAM TRACING: CPT | Performed by: EMERGENCY MEDICINE

## 2022-05-24 PROCEDURE — 84484 ASSAY OF TROPONIN QUANT: CPT

## 2022-05-24 PROCEDURE — 80053 COMPREHEN METABOLIC PANEL: CPT

## 2022-05-24 PROCEDURE — 99285 EMERGENCY DEPT VISIT HI MDM: CPT

## 2022-05-24 PROCEDURE — 96374 THER/PROPH/DIAG INJ IV PUSH: CPT

## 2022-05-24 RX ORDER — ACETAMINOPHEN 325 MG/1
650 TABLET ORAL EVERY 6 HOURS PRN
Status: DISCONTINUED | OUTPATIENT
Start: 2022-05-24 | End: 2022-05-26 | Stop reason: HOSPADM

## 2022-05-24 RX ORDER — ALBUTEROL SULFATE 2.5 MG/3ML
2.5 SOLUTION RESPIRATORY (INHALATION) EVERY 4 HOURS PRN
Status: DISCONTINUED | OUTPATIENT
Start: 2022-05-24 | End: 2022-05-26 | Stop reason: HOSPADM

## 2022-05-24 RX ORDER — POTASSIUM CHLORIDE 7.45 MG/ML
10 INJECTION INTRAVENOUS PRN
Status: DISCONTINUED | OUTPATIENT
Start: 2022-05-24 | End: 2022-05-26 | Stop reason: HOSPADM

## 2022-05-24 RX ORDER — NICOTINE 21 MG/24HR
1 PATCH, TRANSDERMAL 24 HOURS TRANSDERMAL DAILY
Status: DISCONTINUED | OUTPATIENT
Start: 2022-05-24 | End: 2022-05-26 | Stop reason: HOSPADM

## 2022-05-24 RX ORDER — ONDANSETRON 2 MG/ML
4 INJECTION INTRAMUSCULAR; INTRAVENOUS EVERY 6 HOURS PRN
Status: DISCONTINUED | OUTPATIENT
Start: 2022-05-24 | End: 2022-05-26 | Stop reason: HOSPADM

## 2022-05-24 RX ORDER — ASPIRIN 81 MG/1
324 TABLET, CHEWABLE ORAL ONCE
Status: COMPLETED | OUTPATIENT
Start: 2022-05-24 | End: 2022-05-24

## 2022-05-24 RX ORDER — ASPIRIN 81 MG/1
81 TABLET ORAL DAILY
COMMUNITY
End: 2022-09-07 | Stop reason: SDUPTHER

## 2022-05-24 RX ORDER — ATORVASTATIN CALCIUM 40 MG/1
40 TABLET, FILM COATED ORAL NIGHTLY
COMMUNITY

## 2022-05-24 RX ORDER — POTASSIUM CHLORIDE 20 MEQ/1
40 TABLET, EXTENDED RELEASE ORAL PRN
Status: DISCONTINUED | OUTPATIENT
Start: 2022-05-24 | End: 2022-05-26 | Stop reason: HOSPADM

## 2022-05-24 RX ORDER — FUROSEMIDE 10 MG/ML
40 INJECTION INTRAMUSCULAR; INTRAVENOUS ONCE
Status: COMPLETED | OUTPATIENT
Start: 2022-05-24 | End: 2022-05-24

## 2022-05-24 RX ORDER — SODIUM CHLORIDE 0.9 % (FLUSH) 0.9 %
5-40 SYRINGE (ML) INJECTION EVERY 12 HOURS SCHEDULED
Status: DISCONTINUED | OUTPATIENT
Start: 2022-05-24 | End: 2022-05-26 | Stop reason: HOSPADM

## 2022-05-24 RX ORDER — ALLOPURINOL 100 MG/1
100 TABLET ORAL 2 TIMES DAILY
Status: DISCONTINUED | OUTPATIENT
Start: 2022-05-24 | End: 2022-05-26 | Stop reason: HOSPADM

## 2022-05-24 RX ORDER — ASPIRIN 81 MG/1
81 TABLET ORAL DAILY
Status: DISCONTINUED | OUTPATIENT
Start: 2022-05-25 | End: 2022-05-26 | Stop reason: HOSPADM

## 2022-05-24 RX ORDER — SODIUM CHLORIDE 9 MG/ML
INJECTION, SOLUTION INTRAVENOUS PRN
Status: DISCONTINUED | OUTPATIENT
Start: 2022-05-24 | End: 2022-05-26 | Stop reason: HOSPADM

## 2022-05-24 RX ORDER — ONDANSETRON 4 MG/1
4 TABLET, ORALLY DISINTEGRATING ORAL EVERY 8 HOURS PRN
Status: DISCONTINUED | OUTPATIENT
Start: 2022-05-24 | End: 2022-05-26 | Stop reason: HOSPADM

## 2022-05-24 RX ORDER — FUROSEMIDE 10 MG/ML
20 INJECTION INTRAMUSCULAR; INTRAVENOUS ONCE
Status: DISCONTINUED | OUTPATIENT
Start: 2022-05-24 | End: 2022-05-24

## 2022-05-24 RX ORDER — POLYETHYLENE GLYCOL 3350 17 G/17G
17 POWDER, FOR SOLUTION ORAL DAILY PRN
Status: DISCONTINUED | OUTPATIENT
Start: 2022-05-24 | End: 2022-05-26 | Stop reason: HOSPADM

## 2022-05-24 RX ORDER — METOPROLOL TARTRATE 50 MG/1
50 TABLET, FILM COATED ORAL 2 TIMES DAILY
Status: DISCONTINUED | OUTPATIENT
Start: 2022-05-24 | End: 2022-05-24

## 2022-05-24 RX ORDER — LISINOPRIL 10 MG/1
10 TABLET ORAL DAILY
Status: DISCONTINUED | OUTPATIENT
Start: 2022-05-24 | End: 2022-05-26

## 2022-05-24 RX ORDER — ALBUTEROL SULFATE 90 UG/1
2 AEROSOL, METERED RESPIRATORY (INHALATION) EVERY 4 HOURS PRN
Status: DISCONTINUED | OUTPATIENT
Start: 2022-05-24 | End: 2022-05-24 | Stop reason: CLARIF

## 2022-05-24 RX ORDER — FUROSEMIDE 10 MG/ML
40 INJECTION INTRAMUSCULAR; INTRAVENOUS 2 TIMES DAILY
Status: DISCONTINUED | OUTPATIENT
Start: 2022-05-24 | End: 2022-05-26 | Stop reason: HOSPADM

## 2022-05-24 RX ORDER — ATORVASTATIN CALCIUM 40 MG/1
40 TABLET, FILM COATED ORAL NIGHTLY
Status: DISCONTINUED | OUTPATIENT
Start: 2022-05-24 | End: 2022-05-26 | Stop reason: HOSPADM

## 2022-05-24 RX ORDER — ALLOPURINOL 100 MG/1
100 TABLET ORAL 2 TIMES DAILY
COMMUNITY
End: 2022-09-07

## 2022-05-24 RX ORDER — ACETAMINOPHEN 650 MG/1
650 SUPPOSITORY RECTAL EVERY 6 HOURS PRN
Status: DISCONTINUED | OUTPATIENT
Start: 2022-05-24 | End: 2022-05-26 | Stop reason: HOSPADM

## 2022-05-24 RX ORDER — ENOXAPARIN SODIUM 100 MG/ML
40 INJECTION SUBCUTANEOUS DAILY
Status: DISCONTINUED | OUTPATIENT
Start: 2022-05-24 | End: 2022-05-26 | Stop reason: HOSPADM

## 2022-05-24 RX ORDER — SODIUM CHLORIDE 0.9 % (FLUSH) 0.9 %
5-40 SYRINGE (ML) INJECTION PRN
Status: DISCONTINUED | OUTPATIENT
Start: 2022-05-24 | End: 2022-05-26 | Stop reason: HOSPADM

## 2022-05-24 RX ORDER — LORATADINE 10 MG/1
10 TABLET ORAL DAILY
COMMUNITY

## 2022-05-24 RX ADMIN — ENOXAPARIN SODIUM 40 MG: 100 INJECTION SUBCUTANEOUS at 16:38

## 2022-05-24 RX ADMIN — FUROSEMIDE 20 MG: 10 INJECTION INTRAMUSCULAR; INTRAVENOUS at 10:00

## 2022-05-24 RX ADMIN — ALLOPURINOL 100 MG: 100 TABLET ORAL at 20:53

## 2022-05-24 RX ADMIN — ATORVASTATIN CALCIUM 40 MG: 40 TABLET, FILM COATED ORAL at 20:53

## 2022-05-24 RX ADMIN — SODIUM CHLORIDE, PRESERVATIVE FREE 10 ML: 5 INJECTION INTRAVENOUS at 20:54

## 2022-05-24 RX ADMIN — FUROSEMIDE 40 MG: 10 INJECTION, SOLUTION INTRAMUSCULAR; INTRAVENOUS at 18:31

## 2022-05-24 RX ADMIN — FUROSEMIDE 20 MG: 10 INJECTION, SOLUTION INTRAMUSCULAR; INTRAVENOUS at 11:22

## 2022-05-24 RX ADMIN — METOPROLOL TARTRATE 75 MG: 50 TABLET, FILM COATED ORAL at 16:35

## 2022-05-24 RX ADMIN — ASPIRIN 81 MG CHEWABLE TABLET 324 MG: 81 TABLET CHEWABLE at 11:19

## 2022-05-24 ASSESSMENT — PAIN SCALES - GENERAL
PAINLEVEL_OUTOF10: 0
PAINLEVEL_OUTOF10: 0
PAINLEVEL_OUTOF10: 9

## 2022-05-24 ASSESSMENT — PAIN - FUNCTIONAL ASSESSMENT: PAIN_FUNCTIONAL_ASSESSMENT: 0-10

## 2022-05-24 ASSESSMENT — PAIN DESCRIPTION - LOCATION: LOCATION: LEG

## 2022-05-24 NOTE — CONSULTS
I independently performed an evaluation on the patient. I have reviewed the above documentation completed by the advance practitioner. Please see my additional contributions to the HPI, physical exam, assessment and medical decision making. Physical Exam   BP (!) 193/71   Pulse 78   Temp 97 °F (36.1 °C) (Oral)   Resp 22   SpO2 100%   Constitutional: Oriented to person, place, and time. Well-developed and well-nourished. No distress. Head: Normocephalic and atraumatic. Eyes: EOM are normal. Pupils are equal, round, and reactive to light. Neck: Normal range of motion. Neck supple. No hepatojugular reflux and no JVD present. Carotid bruit is not present. No tracheal deviation present. No thyromegaly present. Cardiovascular: Normal rate, regular rhythm, normal heart sounds and intact distal pulses. Exam reveals no gallop and no friction rub. No murmur heard. Pulmonary/Chest: Effort normal and breath sounds normal. No respiratory distress. No wheezes. No rales. No tenderness. Abdominal: Soft. Bowel sounds are normal. No distension and no mass. No tenderness. No rebound and no guarding. Musculoskeletal: Normal range of motion. No edema and no tenderness. Lymphadenopathy:   No cervical adenopathy. Neurological: Alert and oriented to person, place, and time. Skin: Skin is warm and dry. No rash noted. Not diaphoretic. No erythema. Psychiatric: Normal mood and affect. Behavior is normal.     TTE 8/11/21:  Normal LV size with low normal to mildly reduced LVEF  Heavily calcified and severely restricted aortic valve with severe aortic stenosis (V-max 4.1, mean gradient 34) and moderate to severe aortic insufficiency  Mild to moderate MR  Normal RV size and systolic function  No significant TR    See accompanying documentation for full consult. ASSESSMENT AND PLAN:  1. 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 Echo pending. Gently diurese.     Further recommendations based on echo and clinical course. If she compensates readily, then tentatively proceed with her previously planned pre-op MICHAEL and cath later in the week. 2. VHD: Known severe AS with mild to mod AI and MR by 8/11/2021 echo. Echo here pending. 3. Elevated troponin:     Will need cath prior to valvular intervention. ASA/BB/statin. 4. HTN: Observe. 5. CKD: Follow labs. 6. Asthma    7. Tobacco use. Maddy Estes D.O.   Cardiologist  Cardiology, [address] [facility]

## 2022-05-24 NOTE — H&P
Hospital Medicine History & Physical      PCP: Preeti Austin PA-C    Date of Admission: 5/24/2022    Date of Service: Pt seen/examined on 5/24/22 and Admitted to Inpatient with expected LOS greater than two midnights due to medical therapy. Chief Complaint:  Leg swelling and shortness of breath     History Of Present Illness:    74 y.o. female with a hx of asthma, hypertension and gout who [facility] with worsening b/l leg swelling and dyspnea on exertion. Pt was seen by her PCP and US was done to r/o dvt in her L leg which was negative, but leg swelling worsening and pt advised to present to the ED yesterday; presented today. Upon presentation to the ED vitals were reassuring with the exception of hypertension of 193/71. Pt had significant b/l lower extremity edema. Troponin was elevated. Pt was given 324 mg of aspirin and 40 mg of lasix and admitted for further evaluation and treatment. Patient reports improvement in her SOB with lasix administration. Admitted for further evaluation and treatment. At time of my evaluation patient denies chest pain. Reports no dyspnea, but does state that when she exerts herself for the last month that she does get short of breath. 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  Authorizing Provider   allopurinol (ZYLOPRIM) 100 MG tablet Take 100 mg by mouth 2 times daily   Yes Historical Provider, MD   atorvastatin (LIPITOR) 40 MG tablet Take 40 mg by mouth at bedtime   Yes Historical Provider, MD   loratadine (CLARITIN) 10 MG tablet Take 10 mg by mouth daily   Yes Historical Provider, MD   aspirin 81 MG EC tablet Take 81 mg by mouth daily   Yes Historical Provider, MD   lisinopril (PRINIVIL;ZESTRIL) 10 MG tablet Take 1 tablet by mouth daily 5/18/22   Everton Gagnon PA-C   metoprolol tartrate (LOPRESSOR) 25 MG tablet Take 2 tablets by mouth 2 times daily 5/16/22   Everton Gagnon PA-C   albuterol sulfate  (90 Base) MCG/ACT inhaler Inhale 2 puffs into the lungs every 4 hours as needed for Wheezing 1/31/22   Everton Gagnon PA-C   montelukast (SINGULAIR) 10 MG tablet Take 1 tablet by mouth nightly 9/7/21   Everton Gagnon PA-C   meloxicam (MOBIC) 7.5 MG tablet Take 1 tablet by mouth daily as needed for Pain 7/15/21   Jaspal Dopp, DO       Allergies:  Pcn [penicillins]    Social History:      The patient currently lives at home with her      TOBACCO:   reports that she has been smoking cigarettes. She started smoking about 22 years ago. She has a 10.00 pack-year smoking history. She has never used smokeless tobacco.  ETOH:   reports no history of alcohol use. Family History:      Reviewed in detail and negative for DM, CAD, Cancer, CVA. Positive as follows:        Problem Relation Age of Onset    Diabetes Mother     High Blood Pressure Mother     Heart Disease Father        REVIEW OF SYSTEMS:   Pertinent positives as noted in the HPI. All other systems reviewed and negative. PHYSICAL EXAM:    BP (!) 193/71   Pulse 78   Temp 97 °F (36.1 °C) (Oral)   Resp 22   SpO2 100%     General appearance:  No apparent distress, appears stated age and cooperative. HEENT:  Normal cephalic, atraumatic without obvious deformity. Pupils equal, round, and reactive to light. Extra ocular muscles intact. Conjunctivae/corneas clear. Neck: Supple, with full range of motion. No jugular venous distention. Trachea midline. Respiratory:  Normal respiratory effort. Clear to auscultation, bilaterally without Rales/Wheezes/Rhonchi.  Crackles in b/l bases   Cardiovascular:  Regular rate and rhythm with normal S1/S2 without murmurs, rubs or gallops. Abdomen: Soft, non-tender, non-distended with normal bowel sounds. Musculoskeletal: 3+ b/l lower extremity edema extending to knees  Skin: Skin color, texture, turgor normal.  No rashes or lesions. Neurologic:  Neurovascularly intact without any focal sensory/motor deficits. Grossly non-focal.  Psychiatric:  Alert and oriented, thought content appropriate, normal insight    XR CHEST PORTABLE   Final Result   1. Prominent left heart and pulmonary venous hypertension. Findings suggest   minimal CHF/volume overload. 2.  No evidence of alveolar consolidation. Labs:     Recent Labs     05/24/22  1005   WBC 7.8   HGB 13.0   HCT 40.7        Recent Labs     05/24/22  1005      K 4.1      CO2 23   BUN 18   CREATININE 1.4*   CALCIUM 8.7     Recent Labs     05/24/22  1005   AST 35*   *   BILITOT 0.7   ALKPHOS 79     No results for input(s): INR in the last 72 hours. No results for input(s): [place] in the last 72 hours.     Urinalysis:      Lab Results   Component Value Date    NITRU NEGATIVE 11/03/2013    WBCUA NONE 11/03/2013    BACTERIA NONE 11/03/2013    RBCUA NONE 11/03/2013    BLOODU trace 04/15/2022    BLOODU TRACE 11/03/2013    SPECGRAV 1.025 04/15/2022    SPECGRAV 1.020 11/03/2013    GLUCOSEU neg 04/15/2022    GLUCOSEU NEGATIVE 11/03/2013         ASSESSMENT:  Acute decompensated heart failure  Dyspnea on exertion  Elevated troponin  Hypertensive urgency   CKD  HTN  Asthma  Transaminitis    PLAN:  Strict I/O, monitor renal function  Continue lasix 40 mg IV BID  Cardiology consulted, follow up recs  Home antihypertensives resumed  Follow LFT's - likely related to CHF     updated at bedside on 5/24/22      DVT Prophylaxis: lovenox  Diet: No diet orders on file  Code Status: No Order    PT/OT Eval Status: as needed    Dispo - home w [facility] at [facility]    Thank you Amanda Stone PA-C for the opportunity to be involved in this patient's care. If you have any questions or concerns please feel free to contact me through [facility].

## 2022-05-24 NOTE — CARE COORDINATION
ANUP transition of care. Pt presented to [facility] ER secondary to leg swelling and shortness of breath. Pt admitted with heart failure. Cardiology and heart failure nurse on consult. Echocardiogram and Lasix 40 mg IV BID ordered. Met with pt and her spouse at bedside to discuss d/c planning. Pt and her spouse live in a 2 story home with 4-5 steps to enter. Bed and bath are located on the 2nd floor with full flight of steps. Pt is independent with ADLs, works part time, and drives. Pt uses a cane and has a BSC. No hx of HHC/SNF/ARU. Pt PCP is Raymundo Ravi PA-C and she uses [email or web address] in [place]. Pt plans to d/c to home when medically stable. No needs identified at this time. Pt  to provide transportation at d/c.  CM/SW to follow. Cathy Smith RN CM.

## 2022-05-24 NOTE — ED PROVIDER NOTES
Calculation (Susan) 424 ms    P Axis 65 degrees    R Axis 78 degrees    T Axis -163 degrees       RADIOLOGY:  Interpreted by Radiologist.  XR CHEST PORTABLE   Final Result   1. Prominent left heart and pulmonary venous hypertension. Findings suggest   minimal CHF/volume overload. 2.  No evidence of alveolar consolidation.             ------------------------- NURSING NOTES AND VITALS REVIEWED ---------------------------   The nursing notes within the ED encounter and vital signs as below have been reviewed. BP (!) 160/49   Pulse 79   Temp 97.8 °F (36.6 °C) (Oral)   Resp 18   Ht 5' 5\" (1.651 m)   Wt 150 lb 8 oz (68.3 kg)   SpO2 99%   BMI 25.04 kg/m²   Oxygen Saturation Interpretation: Normal      ---------------------------------------------------PHYSICAL EXAM--------------------------------------      Constitutional/General: Alert and oriented x3, well appearing, non toxic in NAD  Head: Normocephalic and atraumatic  Eyes: EOMI  Mouth: Oropharynx clear, handling secretions, no trismus  Neck: Supple, full ROM,   Pulmonary: Lungs clear to auscultation bilaterally, scattered wheezes bilaterally, rales, or rhonchi. Not in respiratory distress  Cardiovascular:  Regular rate and rhythm, gallops, or rubs. 2+ distal pulses  Abdomen: Soft, non tender, non distended,   Extremities: Moves all extremities x 4. Warm and well perfused, 2+ pitting edema to bilateral LE, pedal pulses obtainable via Doppler bilaterally, warm and well-perfused, soft and easily compressible compartments, no wounds, no evidence of acute compartment syndrome or acute limb ischemia  Skin: warm and dry without rash  Neurologic: GCS 15, no focal motor or sensory deficits   Psych: Normal Affect.  Behavior normal.      ------------------------------ ED COURSE/MEDICAL DECISION MAKING----------------------  Medications   sodium chloride flush 0.9 % injection 5-40 mL (has no administration in time range)   sodium chloride flush 0.9 % injection 5-40 mL (has no administration in time range)   0.9 % sodium chloride infusion (has no administration in time range)   ondansetron (ZOFRAN-ODT) disintegrating tablet 4 mg (has no administration in time range)     Or   ondansetron (ZOFRAN) injection 4 mg (has no administration in time range)   polyethylene glycol (GLYCOLAX) packet 17 g (has no administration in time range)   acetaminophen (TYLENOL) tablet 650 mg (has no administration in time range)     Or   acetaminophen (TYLENOL) suppository 650 mg (has no administration in time range)   enoxaparin (LOVENOX) injection 40 mg (40 mg SubCUTAneous Given 5/24/22 1638)   perflutren lipid microspheres (DEFINITY) injection 1.65 mg (has no administration in time range)   potassium chloride (KLOR-CON M) extended release tablet 40 mEq (has no administration in time range)     Or   potassium bicarb-citric acid (EFFER-K) effervescent tablet 40 mEq (has no administration in time range)     Or   potassium chloride 10 mEq/100 mL IVPB (Peripheral Line) (has no administration in time range)   allopurinol (ZYLOPRIM) tablet 100 mg (100 mg Oral Not Given 5/24/22 1740)   aspirin EC tablet 81 mg (has no administration in time range)   atorvastatin (LIPITOR) tablet 40 mg (has no administration in time range)   lisinopril (PRINIVIL;ZESTRIL) tablet 10 mg (has no administration in time range)   furosemide (LASIX) injection 40 mg (has no administration in time range)   albuterol (PROVENTIL) nebulizer solution 2.5 mg (has no administration in time range)   metoprolol tartrate (LOPRESSOR) tablet 75 mg (75 mg Oral Given 5/24/22 1635)   nicotine (NICODERM CQ) 14 MG/24HR 1 patch (has no administration in time range)   furosemide (LASIX) injection 40 mg (20 mg IntraVENous Given 5/24/22 1122)   aspirin chewable tablet 324 mg (324 mg Oral Given 5/24/22 1119)       Medical Decision Making/ED COURSE:   Patient is a 81-year-old female presenting with leg edema and shortness of breath.  In the ED, patient was hypertensive. On exam, she appeared fluid overloaded but was in no acute respiratory distress. Patient was placed on the cardiac monitor. I interpreted findings. Rhythm - sinus. Labs and CXR obtained. Patient administered lasix. I reviewed and interpreted labs. Patient had elevated BNP. Troponin elevated, stable on repeat. No acute EKG changes. Troponin elevation like from acute CHF. ACS not suspected. CXR showed fluid overload. Patient requires admission for further work up and treatment. Patient remained hemodynamically stable throughout ED course. ED Course as of 05/24/22 1754   Tue May 24, 2022   0745 EKG: This EKG is signed and interpreted by me. Rate: 75  Rhythm: Sinus  Interpretation: Normal sinus rhythm, normal CA interval, normal QTC, T wave abnormalities appear unchanged when compared to prior EKG, no acute ST or T wave changes  Comparison: stable as compared to patient's most recent EKG   [JA]   0946 I reviewed the patient's chart. ECHO 7/15/21:   Summary   Moderate concentric LVH   Ejection fraction is visually estimated at 50 to 55%. Normal right ventricular size and function. Mild thickening of the mitral valve leaflets. Mild mitral regurgitation is present. Severe calcification of the aortic valve . Moderate aortic regurgitation is noted. There severe aortic stenosis . The aortic valve area is 0.6cm2 and   Dimensionless index of 0.18 to 0.23 consistent with severe to critical   aortic stenosis [JA]      ED Course User Index  [JA] Dinorah Simpson MD       Current Discharge Medication List        Dinorah Simpson MD      Counseling: The emergency provider has spoken with the patient and discussed todays results, in addition to providing specific details for the plan of care and counseling regarding the diagnosis and prognosis.   Questions are answered at this time and they are agreeable with the plan.      --------------------------------- IMPRESSION AND DISPOSITION ---------------------------------    IMPRESSION  1. Acute combined systolic and diastolic congestive heart failure ([address]    2. Essential hypertension        DISPOSITION  Disposition: Admit to telemetry  Patient condition is stable      NOTE: This report was transcribed using voice recognition software.  Every effort was made to ensure accuracy; however, inadvertent computerized transcription errors may be present    I, Janeth Crook MD, am the primary provider of this record       Janeth Crook MD  05/24/22 [phone]

## 2022-05-24 NOTE — CONSULTS
Inpatient Cardiology Consultation      Reason for Consult:  Acute decompensated HF    Consulting Physician: Dr Desirae Arzate     Requesting Physician:  Luan Ward MD    Date of Consultation: 5/24/2022    HISTORY OF PRESENT ILLNESS:   Patient is a 79-year-old -American female who follows with Dr. Kina Saenz. She was last seen in the structural heart disease clinic 9/30/2021 with mixed severe AAS/AR and early LV remodeling. Per last telephone encounter the patient is set to have [address] 6/16/2022 for SAVR. PMHx: HFpEF with VHD (TTE 8/11/2021--LVEF 48 to 55%, mild MR, mild AR, severe AS), hypertension, CKD (baseline 1.2-1.3), asthma. HPI:  Patient presented to ER 5/24/2022 for a 1 month history of worsening lower extremity edema bilaterally. She also is complaining of intermittent SOB. She had a DVT study yesterday which was negative bilaterally. Patient was treated in ER with 324 mg of aspirin and 40 mg Lasix and admitted for further evaluation. Vital signs 97 Fahrenheit, 22 respirations, 78 pulse, 193/71, 100% on room air. Labs: Potassium 4.1, BUN 18, creatinine 1.4 (1.3 4/2022), glucose 75, total protein 6, proBNP 23,613,HScTNT 58>56 (0.05 11/2015), albumin 3.1, , AST 35, WBC 7.8, H&H 13.0/40.7, platelet 212, UA negative. CXR: Prominent left heart and pulmonary venous hypertension. Findings suggestive of minimal CHF/volume overload. Ultrasound bilateral lower extremities: Negative for DVTs. Upon assessment today 5/20/2022 patient is semi-Puente's in hospital bed in the emergency department. The patient is alert and oriented, on room air, speaking full sentences, in no acute distress. She reports her last month she has noticed worsening bilateral lower edema up to her calf and worsening ALMEIDA, especially with steps. The patient also reports that she has had orthopnea for quite some time and has to sleep in a chair or up in bed.   The patient denies chest pain, PND, dizziness, palpitations, diaphoresis, nausea, or syncope. The patient seems to think that this all started after she started taking Mobic on an every day basis for her arthritis. The patient is scheduled to have a [facility]EL/[facility] 6/16/2022 for a SAVR with Dr. Amira Ball. The patient reports she is still agreeable at this time for surgery. She reports she takes all her medications as prescribed, but is still a 1 PPD smoker. Most recent vitals are respirations 22, pulse 78, /52, 100% on room air. Please note: past medical records were reviewed per electronic medical record (EMR) - see detailed reports under Past Medical/ Surgical History. Past Medical History:    HTN, currently uncontrolled  CKD--baseline 1.2-1.3--today 1.4  Asthma  HFpEF with VHD:  TTE 8/11/2021: Moderate concentric LVH. Ejection fraction is visually estimated at 50 to 55%. Normal right ventricular size and function. Mild thickening of the mitral valve leaflets. Mild mitral regurgitation is present. Severe calcification of the aortic valve . Moderate aortic regurgitation is noted. There severe aortic stenosis . The aortic valve area is 0.6cm2 and Dimensionless index of 0.18 to 0.23 consistent with severe to critical aortic stenosis  TTE 6/26/2018: Left ventricular size is grossly normal. Moderate left ventricular concentric hypertrophy noted. Ejection fraction is visually estimated at 50%. Severe aortic stenosis is present. The aortic valve area is 0.76 cm2 with a maximum gradient of 55 mmHg and a mean gradient of 29 mmHg. Mild aortic regurgitation is noted. Prior to Admission medications    Medication Sig Start Date End Date Taking?  Authorizing Provider   allopurinol (ZYLOPRIM) 100 MG tablet Take 100 mg by mouth 2 times daily   Yes Historical Provider, MD   atorvastatin (LIPITOR) 40 MG tablet Take 40 mg by mouth at bedtime   Yes Historical Provider, MD   loratadine (CLARITIN) 10 MG tablet Take 10 mg by mouth daily   Yes Historical Provider, MD   aspirin 81 MG EC tablet Take 81 mg by mouth daily   Yes Historical Provider, MD   lisinopril (PRINIVIL;ZESTRIL) 10 MG tablet Take 1 tablet by mouth daily 5/18/22   Kerline Guzman PA-C   metoprolol tartrate (LOPRESSOR) 25 MG tablet Take 2 tablets by mouth 2 times daily 5/16/22   Kerline Guzman PA-C   albuterol sulfate  (90 Base) MCG/ACT inhaler Inhale 2 puffs into the lungs every 4 hours as needed for Wheezing 1/31/22   Kerline Guzman PA-C   montelukast (SINGULAIR) 10 MG tablet Take 1 tablet by mouth nightly 9/7/21   Kerline Guzman PA-C   meloxicam (MOBIC) 7.5 MG tablet Take 1 tablet by mouth daily as needed for Pain 7/15/21   Felicia Rodriguez DO       Current Medications:    Current Facility-Administered Medications: sodium chloride flush 0.9 % injection 5-40 mL, 5-40 mL, IntraVENous, 2 times per day  sodium chloride flush 0.9 % injection 5-40 mL, 5-40 mL, IntraVENous, PRN  0.9 % sodium chloride infusion, , IntraVENous, PRN  ondansetron (ZOFRAN-ODT) disintegrating tablet 4 mg, 4 mg, Oral, Q8H PRN **OR** ondansetron (ZOFRAN) injection 4 mg, 4 mg, IntraVENous, Q6H PRN  polyethylene glycol (GLYCOLAX) packet 17 g, 17 g, Oral, Daily PRN  acetaminophen (TYLENOL) tablet 650 mg, 650 mg, Oral, Q6H PRN **OR** acetaminophen (TYLENOL) suppository 650 mg, 650 mg, Rectal, Q6H PRN  enoxaparin (LOVENOX) injection 40 mg, 40 mg, SubCUTAneous, Daily  perflutren lipid microspheres (DEFINITY) injection 1.65 mg, 1.5 mL, IntraVENous, ONCE PRN  potassium chloride (KLOR-CON M) extended release tablet 40 mEq, 40 mEq, Oral, PRN **OR** potassium bicarb-citric acid (EFFER-K) effervescent tablet 40 mEq, 40 mEq, Oral, PRN **OR** potassium chloride 10 mEq/100 mL IVPB (Peripheral Line), 10 mEq, IntraVENous, PRN  allopurinol (ZYLOPRIM) tablet 100 mg, 100 mg, Oral, BID  [START ON 5/25/2022] aspirin EC tablet 81 mg, 81 mg, Oral, Daily  atorvastatin (LIPITOR) tablet 40 mg, 40 mg, Oral, Nightly  lisinopril (PRINIVIL;ZESTRIL) tablet 10 mg, 10 mg, Oral, Daily  metoprolol tartrate (LOPRESSOR) tablet 50 mg, 50 mg, Oral, BID  furosemide (LASIX) injection 40 mg, 40 mg, IntraVENous, BID  albuterol (PROVENTIL) nebulizer solution 2.5 mg, 2.5 mg, Nebulization, Q4H PRN    Allergies:  Pcn [penicillins]    Social History:    Housing: Lives with  in [facility]. Tobacco: 1 pack/day for 25 years  EtOH: Social  Drugs: Denies  Oxygen: Room air at home  Ambulation: Uses cane for ambulation. Family History:   Family History   Problem Relation Age of Onset    Diabetes Mother     High Blood Pressure Mother     Heart Disease Father          REVIEW OF SYSTEMS:     Constitutional: Denies fevers, chills, night sweats, and fatigue  HEENT: Denies headaches, nose bleeds, and blurred vision,oral pain, abscess or lesion. Musculoskeletal: Denies falls, pain to BLE with ambulation. Worsening bilateral lower leg swelling x1 month. Neurological: Denies dizziness and lightheadedness, numbness and tingling  Cardiovascular: Denies chest pain, palpitations, and feelings of heart racing. Respiratory: Admits to worsening orthopnea and ALMEIDA, especially with steps. Denies PND  Gastrointestinal: Denies heartburn, nausea/vomiting, diarrhea and constipation, black/bloody, and tarry stools. Genitourinary: Denies dysuria and hematuria  Hematologic: Denies excessive bruising or bleeding  Lymphatic: Denies lumps and bumps to neck, axilla, breast, and groin  Endocrine: Denies excessive thirst. Denies intolerance to hot and cold  GYN: Postmenopausal state; Denies vaginal bleeding. Psychiatric: Denies anxiety and depression. PHYSICAL EXAM:   BP (!) 193/71   Pulse 78   Temp 97 °F (36.1 °C) (Oral)   Resp 22   SpO2 100%   CONST:  Well developed, 44-year-old -American female well nourished who appears stated age.  Awake, alert, cooperative, no apparent distress  HEENT:   Head- Normocephalic, atraumatic   Eyes- Conjunctivae pink, anicteric  Throat- Oral mucosa pink and moist  Neck- No stridor, trachea midline, no jugular venous distention. No adenopathy   CHEST: Chest symmetrical and non-tender to palpation. No accessory muscle use or intercostal retractions  RESPIRATORY: Lung sounds -crackles bilateral lower lobes with expiratory wheezes throughout upper lobes. CARDIOVASCULAR:     No carotid bruit  Heart Inspection- shows no noted pulsations  Heart Palpation- no heaves or thrills; PMI is non-displaced   Heart Ausculation- Regular rate and rhythm, no murmur. No s3, s4 or rub   PV: +2 pitting edema bilateral lower extremities. No varicosities. Pedal pulses palpable, no clubbing or cyanosis   ABDOMEN: Soft, non-tender to light palpation. Bowel sounds present. No palpable masses no organomegaly; no abdominal bruit  MS: Good muscle strength and tone. No atrophy or abnormal movements. : Deferred  SKIN: Warm and dry no statis dermatitis or ulcers   NEURO / PSYCH: Oriented to person, place and time. Speech clear and appropriate. Follows all commands. Pleasant affect     DATA:    ECG: Normal sinus rhythm, vent rate 75, IL interval 142, QRS duration 92, QTc 424  Diagnostic:    Labs:   CBC:   Recent Labs     05/24/22  1005   WBC 7.8   HGB 13.0   HCT 40.7        BMP:   Recent Labs     05/24/22  1005      K 4.1   CO2 23   BUN 18   CREATININE 1.4*   LABGLOM 46   CALCIUM 8.7     FASTING LIPID PANEL:  Lab Results   Component Value Date    CHOL 118 04/13/2022    HDL 44 04/13/2022    LDLCALC 53 04/13/2022    TRIG 103 04/13/2022     LIVER PROFILE:  Recent Labs     05/24/22  1005   AST 35*   *   LABALBU 3.1*     Results for Tylor Everett (MRN [id number]) as of 5/24/2022 14:23   Ref. Range 5/24/2022 10:05 5/24/2022 10:11 5/24/2022 11:20   Pro-BNP Latest Ref Range: 0 - 125 pg/mL 23,613 (H)     Troponin, High Sensitivity Latest Ref Range: 0 - 9 ng/L 58 (H)  56 (H)     CXR:  Impression   1. Prominent left heart and pulmonary venous hypertension.   Findings suggest   minimal CHF/volume overload. 2.  No evidence of alveolar consolidation. A&P to follow per Dr Juli Kaiser. Electronically signed by AILEEN Pena CNP on 5/24/2022 at 1:09 PM     I independently performed an evaluation on the patient. I have reviewed the above documentation completed by the advance practitioner. Please see my additional contributions to the HPI, physical exam, assessment and medical decision making. Physical Exam   BP (!) 175/52   Pulse 78   Temp 97 °F (36.1 °C) (Oral)   Resp 22   SpO2 100%   Constitutional: Oriented to person, place, and time. Well-developed and well-nourished. No distress. Head: Normocephalic and atraumatic. Eyes: EOM are normal. Pupils are equal, round, and reactive to light. Neck: Normal range of motion. Neck supple. No hepatojugular reflux and no JVD present. Carotid bruit is not present. No tracheal deviation present. No thyromegaly present. Cardiovascular: Normal rate, regular rhythm, normal heart sounds and intact distal pulses. Exam reveals no gallop and no friction rub. No murmur heard. Pulmonary/Chest: Effort normal and breath sounds normal. No respiratory distress. No wheezes. No rales. No tenderness. Abdominal: Soft. Bowel sounds are normal. No distension and no mass. No tenderness. No rebound and no guarding. Musculoskeletal: Normal range of motion. No edema and no tenderness. Lymphadenopathy:   No cervical adenopathy. Neurological: Alert and oriented to person, place, and time. Skin: Skin is warm and dry. No rash noted. Not diaphoretic. No erythema. Psychiatric: Normal mood and affect.  Behavior is normal.     TTE 8/11/21:  Normal LV size with low normal to mildly reduced LVEF  Heavily calcified and severely restricted aortic valve with severe aortic stenosis (V-max 4.1, mean gradient 34) and moderate to severe aortic insufficiency  Mild to moderate MR  Normal RV size and systolic function  No significant TR    See accompanying documentation for full consult. ASSESSMENT AND PLAN:  1. 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 Echo pending. Gently diurese. Further recommendations based on echo and clinical course. If she compensates readily, then tentatively proceed with her previously planned pre-op MICHAEL and cath later in the week. 2. VHD: Known severe AS with mild to mod AI and MR by 8/11/2021 echo. Echo here pending. 3. Elevated troponin:     Will need cath prior to valvular intervention. ASA/BB/statin. 4. HTN: Observe. 5. CKD: Follow labs. 6. Asthma    7. Tobacco use. Honey Lobato D.O.   Cardiologist  Cardiology, [facility]

## 2022-05-25 LAB
ANION GAP SERPL CALCULATED.3IONS-SCNC: 13 MMOL/L (ref 7–16)
BASOPHILS ABSOLUTE: 0.03 E9/L (ref 0–0.2)
BASOPHILS RELATIVE PERCENT: 0.4 % (ref 0–2)
BUN BLDV-MCNC: 16 MG/DL (ref 6–23)
CALCIUM SERPL-MCNC: 8.3 MG/DL (ref 8.6–10.2)
CHLORIDE BLD-SCNC: 105 MMOL/L (ref 98–107)
CHOLESTEROL, TOTAL: 64 MG/DL (ref 0–199)
CO2: 23 MMOL/L (ref 22–29)
CREAT SERPL-MCNC: 1.5 MG/DL (ref 0.5–1)
EOSINOPHILS ABSOLUTE: 0.11 E9/L (ref 0.05–0.5)
EOSINOPHILS RELATIVE PERCENT: 1.6 % (ref 0–6)
GFR AFRICAN AMERICAN: 42
GFR NON-AFRICAN AMERICAN: 42 ML/MIN/1.73
GLUCOSE BLD-MCNC: 79 MG/DL (ref 74–99)
HCT VFR BLD CALC: 37.3 % (ref 34–48)
HDLC SERPL-MCNC: 24 MG/DL
HEMOGLOBIN: 11.9 G/DL (ref 11.5–15.5)
IMMATURE GRANULOCYTES #: 0.02 E9/L
IMMATURE GRANULOCYTES %: 0.3 % (ref 0–5)
LDL CHOLESTEROL CALCULATED: 26 MG/DL (ref 0–99)
LV EF: 30 %
LVEF MODALITY: NORMAL
LYMPHOCYTES ABSOLUTE: 2.17 E9/L (ref 1.5–4)
LYMPHOCYTES RELATIVE PERCENT: 32.5 % (ref 20–42)
MAGNESIUM: 1.5 MG/DL (ref 1.6–2.6)
MCH RBC QN AUTO: 31.4 PG (ref 26–35)
MCHC RBC AUTO-ENTMCNC: 31.9 % (ref 32–34.5)
MCV RBC AUTO: 98.4 FL (ref 80–99.9)
MONOCYTES ABSOLUTE: 0.79 E9/L (ref 0.1–0.95)
MONOCYTES RELATIVE PERCENT: 11.8 % (ref 2–12)
NEUTROPHILS ABSOLUTE: 3.55 E9/L (ref 1.8–7.3)
NEUTROPHILS RELATIVE PERCENT: 53.4 % (ref 43–80)
PDW BLD-RTO: 14.2 FL (ref 11.5–15)
PLATELET # BLD: 166 E9/L (ref 130–450)
PMV BLD AUTO: 11.8 FL (ref 7–12)
POTASSIUM SERPL-SCNC: 3.3 MMOL/L (ref 3.5–5)
RBC # BLD: 3.79 E12/L (ref 3.5–5.5)
SODIUM BLD-SCNC: 141 MMOL/L (ref 132–146)
TRIGL SERPL-MCNC: 70 MG/DL (ref 0–149)
VLDLC SERPL CALC-MCNC: 14 MG/DL
WBC # BLD: 6.7 E9/L (ref 4.5–11.5)

## 2022-05-25 PROCEDURE — 6360000002 HC RX W HCPCS: Performed by: INTERNAL MEDICINE

## 2022-05-25 PROCEDURE — 93306 TTE W/DOPPLER COMPLETE: CPT

## 2022-05-25 PROCEDURE — 6370000000 HC RX 637 (ALT 250 FOR IP): Performed by: INTERNAL MEDICINE

## 2022-05-25 PROCEDURE — 80048 BASIC METABOLIC PNL TOTAL CA: CPT

## 2022-05-25 PROCEDURE — 83735 ASSAY OF MAGNESIUM: CPT

## 2022-05-25 PROCEDURE — 85025 COMPLETE CBC W/AUTO DIFF WBC: CPT

## 2022-05-25 PROCEDURE — 99233 SBSQ HOSP IP/OBS HIGH 50: CPT | Performed by: INTERNAL MEDICINE

## 2022-05-25 PROCEDURE — 6360000002 HC RX W HCPCS: Performed by: NURSE PRACTITIONER

## 2022-05-25 PROCEDURE — 2580000003 HC RX 258: Performed by: INTERNAL MEDICINE

## 2022-05-25 PROCEDURE — 2060000000 HC ICU INTERMEDIATE R&B

## 2022-05-25 PROCEDURE — 80061 LIPID PANEL: CPT

## 2022-05-25 PROCEDURE — 94640 AIRWAY INHALATION TREATMENT: CPT

## 2022-05-25 PROCEDURE — 36415 COLL VENOUS BLD VENIPUNCTURE: CPT

## 2022-05-25 RX ORDER — MAGNESIUM SULFATE 1 G/100ML
1000 INJECTION INTRAVENOUS PRN
Status: DISCONTINUED | OUTPATIENT
Start: 2022-05-25 | End: 2022-05-26 | Stop reason: HOSPADM

## 2022-05-25 RX ADMIN — LISINOPRIL 10 MG: 10 TABLET ORAL at 08:27

## 2022-05-25 RX ADMIN — SODIUM CHLORIDE, PRESERVATIVE FREE 10 ML: 5 INJECTION INTRAVENOUS at 17:44

## 2022-05-25 RX ADMIN — METOPROLOL TARTRATE 75 MG: 50 TABLET, FILM COATED ORAL at 20:25

## 2022-05-25 RX ADMIN — ALLOPURINOL 100 MG: 100 TABLET ORAL at 20:25

## 2022-05-25 RX ADMIN — MAGNESIUM SULFATE 1000 MG: 1 INJECTION INTRAVENOUS at 12:05

## 2022-05-25 RX ADMIN — FUROSEMIDE 40 MG: 10 INJECTION, SOLUTION INTRAMUSCULAR; INTRAVENOUS at 17:44

## 2022-05-25 RX ADMIN — SODIUM CHLORIDE, PRESERVATIVE FREE 10 ML: 5 INJECTION INTRAVENOUS at 08:26

## 2022-05-25 RX ADMIN — ALLOPURINOL 100 MG: 100 TABLET ORAL at 08:26

## 2022-05-25 RX ADMIN — MAGNESIUM SULFATE 1000 MG: 1 INJECTION INTRAVENOUS at 11:04

## 2022-05-25 RX ADMIN — FUROSEMIDE 40 MG: 10 INJECTION, SOLUTION INTRAMUSCULAR; INTRAVENOUS at 08:27

## 2022-05-25 RX ADMIN — METOPROLOL TARTRATE 75 MG: 50 TABLET, FILM COATED ORAL at 08:26

## 2022-05-25 RX ADMIN — ASPIRIN 81 MG: 81 TABLET, COATED ORAL at 08:27

## 2022-05-25 RX ADMIN — ALBUTEROL SULFATE 2.5 MG: 2.5 SOLUTION RESPIRATORY (INHALATION) at 21:13

## 2022-05-25 RX ADMIN — POTASSIUM BICARBONATE 40 MEQ: 782 TABLET, EFFERVESCENT ORAL at 09:56

## 2022-05-25 RX ADMIN — SODIUM CHLORIDE, PRESERVATIVE FREE 10 ML: 5 INJECTION INTRAVENOUS at 21:50

## 2022-05-25 RX ADMIN — ENOXAPARIN SODIUM 40 MG: 100 INJECTION SUBCUTANEOUS at 08:26

## 2022-05-25 RX ADMIN — ATORVASTATIN CALCIUM 40 MG: 40 TABLET, FILM COATED ORAL at 20:25

## 2022-05-25 ASSESSMENT — EJECTION FRACTION
EF_SOURCE: 2D ECHO
EF_VALUE: 30%

## 2022-05-25 NOTE — CONSULTS
Education initiated,Education completed (patient was receptive, educated on low sodium/heart failure guidelines, contact information provided, patient stated she tends to eat higher saturated fatty foods (ie fried foods), patient receptive to diet instructions and all questions answered)       Babak Quaker, Dietetics Student  Contact, ext [phone]

## 2022-05-25 NOTE — PROGRESS NOTES
Hospitalist Progress Note      Synopsis: Patient admitted for acutely decompensated HF. Amina Ferris presented to ED with worsening b/l leg swelling and dyspnea on exertion per direction from her PCP. Upon presentation to the ED vitals were reassuring with the exception of hypertension of 193/71. Troponin was elevated. Pt was given 324 mg of aspirin and 40 mg of lasix and admitted for further evaluation and treatment. Patient reports improvement in her SOB with lasix administration. Cardiology is following and she is being diuresed. Hospital day 1     Subjective:  Stable overnight. No issues reported. Patient seen and examined. Sitting up in bed. Family at bedside. Reports edema and respiratory status are much improved. Insistent on discharge tomorrow as her [employer] is graduating and she cannot miss it. Inquiring about smoking cessation strategies. Records reviewed. Temp (24hrs), Av.8 °F (36.6 °C), Min:97.7 °F (36.5 °C), Max:97.9 °F (36.6 °C)    DIET: ADULT DIET; Regular; Low Sodium (2 gm); 1500 ml  CODE: Full Code    Intake/Output Summary (Last 24 hours) at 2022 1022  Last data filed at 2022  Gross per 24 hour   Intake 150 ml   Output 700 ml   Net -550 ml       Review of Systems: All bolded are positive; please see HPI  General:  Fever, chills, diaphoresis, fatigue, malaise, night sweats, weight loss  Psychological:  Anxiety, disorientation, hallucinations. ENT:  Epistaxis, headaches, vertigo, visual changes. Cardiovascular:  Chest pain, irregular heartbeats, palpitations, paroxysmal nocturnal dyspnea. Respiratory:  Shortness of breath, coughing, sputum production, hemoptysis, wheezing, orthopnea.   Gastrointestinal:  Nausea, vomiting, diarrhea, heartburn, constipation, abdominal pain, hematemesis, hematochezia, melena, acholic stools  Genito-Urinary:  Dysuria, urgency, frequency, hematuria  Musculoskeletal:  Joint pain, joint stiffness, joint swelling, muscle pain  Neurology:  Headache, focal neurological deficits, weakness, numbness, paresthesia  Derm:  Rashes, ulcers, excoriations, bruising  Extremities:  Decreased ROM, peripheral edema, mottling    Objective:    BP (!) 152/56   Pulse 86   Temp 97.9 °F (36.6 °C) (Oral)   Resp 16   Ht 5' 5\" (1.651 m)   Wt 150 lb 8 oz (68.3 kg)   SpO2 96%   BMI 25.04 kg/m²     General appearance: Elderly female in no apparent distress, appears stated age and cooperative. HEENT: Conjunctivae/corneas clear. Mucous membranes moist.  Neck: Supple. No JVD. Respiratory:  Diffuse wheezing bilaterally, diminished in the bilateral bases. Normal respiratory effort. Cardiovascular:  RRR. S1, S2 without MRG. PV: Pulses palpable. +1 pitting edema. Abdomen: Soft, non-tender, non-distended. +BS  Musculoskeletal: No obvious deformities. Skin: Normal skin color. No rashes or lesions. Good turgor. Neurologic:  Grossly non-focal. Awake, alert, following commands.    Psychiatric: Alert and oriented, thought content appropriate, normal insight and judgement    Medications:  REVIEWED DAILY    Infusion Medications    sodium chloride       Scheduled Medications    sodium chloride flush  5-40 mL IntraVENous 2 times per day    enoxaparin  40 mg SubCUTAneous Daily    allopurinol  100 mg Oral BID    aspirin  81 mg Oral Daily    atorvastatin  40 mg Oral Nightly    lisinopril  10 mg Oral Daily    furosemide  40 mg IntraVENous BID    metoprolol tartrate  75 mg Oral BID    nicotine  1 patch TransDERmal Daily     PRN Meds: sodium chloride flush, sodium chloride, ondansetron **OR** ondansetron, polyethylene glycol, acetaminophen **OR** acetaminophen, perflutren lipid microspheres, potassium chloride **OR** potassium alternative oral replacement **OR** potassium chloride, albuterol    Labs:     Recent Labs     05/24/22  1005 05/25/22  0444   WBC 7.8 6.7   HGB 13.0 11.9   HCT 40.7 37.3    166       Recent Labs     05/24/22  1005 05/25/22  0444    141   K 4.1 3.3*    105   CO2 23 23   BUN 18 16   CREATININE 1.4* 1.5*   CALCIUM 8.7 8.3*       Recent Labs     05/24/22  1005   PROT 6.0*   ALKPHOS 79   *   AST 35*   BILITOT 0.7       No results for input(s): INR in the last 72 hours. No results for input(s): Jesenia Pace in the last 72 hours. Chronic labs:    Lab Results   Component Value Date    CHOL 64 05/25/2022    TRIG 70 05/25/2022    HDL 24 05/25/2022    LDLCALC 26 05/25/2022    INR 1.0 11/03/2013       Radiology: REVIEWED DAILY    Assessment:  Decompensated HF  VHD - severe AS, mild to mod AI + MR  Transaminitis likely 2/2 hepatic congestion r/t above  Elevated troponin  Hypokalemia  Hypomagnesemia   HTN, uncontrolled  CKD, baseline cr seems to be around 1.3   HTN  Asthma  Tobacco abuse    Plan:  Cardiology following  Continue IV diuresis  Awaiting echo  Monitor and replace electrolytes PRN   Monitor renal fxn, I&O  BP control - consider addition of amlodipine  Scheduled for [facility] + [facility] in June for preop work up for valve repair  Counseled on tobacco cessation    DVT Prophylaxis [x] Lovenox  []  Heparin [] DOAC [] PCDs [] Ambulation    GI Prophylaxis [] PPI  [] H2 Blocker   [] Carafate  [x] Diet/Tube Feeds   Level of care [] Med/Surg  [x] Intermediate  []  ICU   Diet ADULT DIET; Regular; Low Sodium (2 gm); 1500 ml    Family contact []  N/A    [x] At bedside  [] Phone call     Discharge Plan: Home when stable  +++++++++++++++++++++++++++++++++++++++++++++++++  TONY Munguia/ [facility], [place]  +++++++++++++++++++++++++++++++++++++++++++++++++  NOTE: This report was transcribed using voice recognition software. Every effort was made to ensure accuracy; however, inadvertent computerized transcription errors may be present.

## 2022-05-25 NOTE — CONSULTS
Patient currently admitted with diagnosis of decompensated heart failure. Patient was alert and oriented during the consultation. She was engaged and asked appropriate questions throughout the education session. She is agreeable to self monitoring, improving her low sodium diet, and calling the CHF clinic when needed for same day appointment scheduling. She was provided with a scale today. Scheduling with the CHF clinic, cardiology APRN, and PCP Yes. Future Appointments   Date Time Provider Chin Wesley   6/1/2022 10:00 AM AVA Quesada [facility]   6/15/2022  1:00 PM SCHEDULE, [facility] AUDIOLOGY SEYZ AUDIO [facility]   6/16/2022  8:30 AM [facility] SPECIAL PROCEDURES LAB SE[facility]Z CATH [facility]   6/22/2022  9:30 AM AILEEN Chapman CNP CARDIO [facility]   5/3/2023 10:30 AM AVA Caraballo [facility]            We reviewed the introduction to Heart Failure, the HF zones, signs and symptoms to report on day 1 of onset, medications, medication compliance, the importance of obtaining daily weights, following a low sodium diet, reading food labels for the sodium content, keeping physician appointments, and smoking cessation. We discussed writing / tracking daily weights on a calendar / log because a 5 pound gain in 1 week can sneak up if you are not tracking it. Contributing risk factors for Heart Failure are identified as transportation, states she has her kids that help her. .  I advised patient they can reduce the risk for Heart Failure exacerbations by modifying / controlling the risk factors. We discussed self-managed care which includes the following:  to take medications as prescribed, report any intolerable side effects of medications to the cardiologist / doctor, do not just stop taking the medication; follow a cardiac heart healthy / low sodium diet; weigh yourself daily, exercise regularly- per doctor recommendation and not to smoke or use an excess amount of alcohol.         We discussed calling the cardiologist / doctor with a weight gain of 3 pounds in one day or a total of 5 pounds or more in one week. Also, if you should have a significant weight loss of 3# or more in one day to call the doctor, they may need to decrease or hold the diuretic dose. On days you feels nauseated and not eating / drinking, having emesis or diarrhea,  informed to call the cardiologist  / doctor, they may need to decrease or hold diuretic to avoid dehydration. I stressed the importance of informing their cardiologist the first day of onset of any of the signs and symptoms in the \"Yellow Zone\" of the HF Zones. Patient verbalizes understanding. Greater than 30 minutes was spent educating patient. The Heart Failure Booklet given to the patient with additional patient education addressing:  · What is Heart Failure? · Things You Can Do to Live Well with HF  · How to Take Your Medications  · How to Eat Less Salt  · [place] its Salt? · Exercising Well with Heart Failure  · Signs and symptoms of HF to report  · Weight Yourself Each Day  · Home Self Management- activity, weight tracking, taking medications as prescribed, meals /diet planning (sodium and fluid restriction), how to read food labels, keeping physician follow ups, smoking cessation, follow the Heart Failure Zones  · The Heart Failure zones  · Every Dose Every Day      Instructed  to call 911 if you have any of the following symptoms:    ·    Struggling to breathe unrelieved with rest,  ·    Having chest pain  ·    Have confusion or cant think clearly      The patient is ordered:  Diet: ADULT DIET; Regular;  Low Sodium (2 gm); 1500 ml   Sodium controlled diet Yes  Fluid restriction daily ordered (fluid restriction recommended if patient is hyponatremic and/or diuretic is initiated or increased) Yes  FR:   Daily Weights:   Patient Vitals for the past 96 hrs (Last 3 readings):   Weight   05/24/22 1513 150 lb 8 oz (68.3 kg)     I/O:     Intake/Output Summary (Last 24 hours) at 5/25/2022 1204  Last data filed at 5/24/2022 2015  Gross per 24 hour   Intake 150 ml   Output 700 ml   Net -550 ml            Jose D Fonseca RN BSN, RN  Heart Failure Navigator        CONGESTIVE HEART FAILURE (CHF) AHA GUIDELINES  (Must be completed for Primary Diagnosis CHF or History of CHF)    Discharge Plan:  I placed the Heart Failure Home Instructions in patient's discharge instructions. Per Heart Failure [facility], the patient should have a follow-up appointment made within 7 days of discharge.     New Diagnosis No    ECHO Results most recent:  Lab Results   Component Value Date    LVEF 53 08/11/2021                                        Social History     Tobacco Use   Smoking Status Current Every Day Smoker    Packs/day: 0.50    Years: 20.00    Pack years: 10.00    Types: Cigarettes    Start date: 7/8/1999   Smokeless Tobacco Never Used        Immunization History   Administered Date(s) Administered    COVID-19, Andrea Stern, Primary or Immunocompromised, PF, 100mcg/0.5mL 04/30/2021, 05/28/2021    Influenza Virus Vaccine 02/01/2013, 10/27/2015, 09/06/2019    Influenza, Quadv, IM, PF (6 mo and older Fluzone, Flulaval, Fluarix, and 3 yrs and older Afluria) 09/06/2019    Pneumococcal Conjugate 13-valent ([id number]) 05/10/2019, 05/10/2019    Pneumococcal Polysaccharide ([id number]) 06/18/2014, 10/27/2015, 07/08/2019    Td (Adult), 5 Lf Tetanus Toxoid, Pf (Tenivac, Decavac) 08/07/2015          Angiotensin-Converting-Enzyme (ACE) inhibitor ordered:  [x] Yes  [] No (specify contraindication):    [] Contraindicated  [] Hypotensive patient who was at immediate risk of cardiogenic shock  [] Hospitalized patient who experienced marked azotemia  [] Other Contraindications  [] Not Eligible  [] Not Tolerant  [] Patient Reason  [] System Reason  [] Other Reason    Angiotensin II receptor blockers (ARB) ordered:  [] Yes  [x] No (specify contraindication):    [] Contraindicated  [] Hypotensive patient who was at immediate risk of cardiogenic shock  [] Hospitalized patient who experienced marked azotemia  [] Other Contraindications    ARNI - Angiotensin Receptor Neprilysin Inhibitor ordered:  [] Yes  [x] No (specify contraindication):    [] ACE inhibitor use within the prior 36 hours  [] Allergy  [] Hyperkalemia  [] Hypotension  [] Renal dysfunction defined as creatinine > 2.5 mg/dL in men or > 2.0 mg/dL in women  [] Other Contraindications  [] Not Eligible  [] Not Tolerant  [] Patient Reason  []System Reason  []Other Reason      Beta Blocker (Carvedilol, Metoprolol Succinate, or Bisoprolol) ordered:    [x] Yes Lopressor  [] No (specify contraindication):    [] Contraindicated  [] Asthma  [] Fluid Overload  [] Low Blood Pressure  [] Patient recently treated with an intravenous positive inotropic agent  [] Other Contraindications  [] Not Eligible  [] Not Tolerant  [] Patient Reason  [] System Reason    SGLT2 Inhibitor ordered:  [] Yes  [x] No (specify contraindication):    [] Contraindicated  [] Patient currently on dialysis  [] Ketoacidosis  [] Known hypersensitivity to the medication  [] Type I diabetes (not approved for use in patients with Type I diabetes due to increased risk of ketoacidosis)  [] Other Contraindications  [] Not Eligible  [] Not Tolerant  [] Patient Reason  [] System Reason  [] Other Reason    Aldosterone Antagonist ordered:  [] Yes  [x] No (specify contraindication):    [] Contraindicated  [] Allergy due to aldosterone receptor antagonist  [] Hyperkalemia  [] Renal dysfunction defined as creatinine >2.5 mg/dL in men or >2.0 mg/dL in women.   [] Other contraindications  [x] Not Eligible  [] Not Tolerant  [] Patient Reason  [] System Reason  [] Other Reason

## 2022-05-25 NOTE — PROGRESS NOTES
CMR sent strip down with 13 beats of vtach verified by this RN.  Dr Evelyn Garcia notified via perfect serve and message sent to [facility] NP and read  Electronically signed by Fátima Gonzalez RN on 5/25/22 at 4:04 PM EDT

## 2022-05-25 NOTE — CARE COORDINATION
Echo completed today. BID lasix continues. Plan is home with family and no additional needs when released. For questions I can be reached at [phone].  Tiffany Barbour [place]

## 2022-05-25 NOTE — PROGRESS NOTES
[facility] Cardiology Inpatient Progress Note    Patient is a 74 y.o. female of [place], [place] seen in hospital follow up. Chief complaint: CHF/AS    HPI: Some SOB. No CP.      Patient Active Problem List   Diagnosis    Hypertension    Mild intermittent asthma without complication    Pure hypercholesterolemia    Tobacco abuse    Acute decompensated heart failure (HCC)    Acute combined systolic and diastolic congestive heart failure (HCC)       Allergies   Allergen Reactions    Pcn [Penicillins]        Current Facility-Administered Medications   Medication Dose Route Frequency Provider Last Rate Last Admin    sodium chloride flush 0.9 % injection 5-40 mL  5-40 mL IntraVENous 2 times per day Agnes De Leon MD   10 mL at 05/25/22 0826    sodium chloride flush 0.9 % injection 5-40 mL  5-40 mL IntraVENous PRN Agnes De Leon MD        0.9 % sodium chloride infusion   IntraVENous PRN Agnes De Leon MD        ondansetron (ZOFRAN-ODT) disintegrating tablet 4 mg  4 mg Oral Q8H PRN Agnes De Leon MD        Or    ondansetron (ZOFRAN) injection 4 mg  4 mg IntraVENous Q6H PRN Agnes De Leon MD        polyethylene glycol (GLYCOLAX) packet 17 g  17 g Oral Daily PRN Agnes De Leon MD        acetaminophen (TYLENOL) tablet 650 mg  650 mg Oral Q6H PRN Agnes De Leon MD        Or    acetaminophen (TYLENOL) suppository 650 mg  650 mg Rectal Q6H PRN Agnes De Leon MD        enoxaparin (LOVENOX) injection 40 mg  40 mg SubCUTAneous Daily Agnes De Leon MD   40 mg at 05/25/22 5267    perflutren lipid microspheres (DEFINITY) injection 1.65 mg  1.5 mL IntraVENous ONCE PRN Agnes De Leon MD        potassium chloride (KLOR-CON M) extended release tablet 40 mEq  40 mEq Oral PRN Agnes De Leon MD        Or    potassium bicarb-citric acid (EFFER-K) effervescent tablet 40 mEq  40 mEq Oral PRN Agnes De Leon MD   40 mEq at 05/25/22 7064    Or    potassium chloride 10 mEq/100 mL IVPB (Peripheral Line)  10 mEq IntraVENous PRN Sheldon Palacios MD  allopurinol (ZYLOPRIM) tablet 100 mg  100 mg Oral BID Agnes De Leon MD   100 mg at 05/25/22 0826    aspirin EC tablet 81 mg  81 mg Oral Daily Agnes De Leon MD   81 mg at 05/25/22 0827    atorvastatin (LIPITOR) tablet 40 mg  40 mg Oral Nightly Agnes De Leon MD   40 mg at 05/24/22 2053    lisinopril (PRINIVIL;ZESTRIL) tablet 10 mg  10 mg Oral Daily Agnes De Leon MD   10 mg at 05/25/22 0827    furosemide (LASIX) injection 40 mg  40 mg IntraVENous BID Agnes De Leon MD   40 mg at 05/25/22 0827    albuterol (PROVENTIL) nebulizer solution 2.5 mg  2.5 mg Nebulization Q4H PRN Agnes De Leon MD        metoprolol tartrate (LOPRESSOR) tablet 75 mg  75 mg Oral BID Brandon Youngblood MD   75 mg at 05/25/22 0826    nicotine (NICODERM CQ) 14 MG/24HR 1 patch  1 patch TransDERmal Daily Brandon Youngblood MD   1 patch at 05/25/22 0831       Review of systems:   Heart: as above   Lungs: as above   Eyes: denies changes in vision or discharge. Ears: denies changes in hearing or pain. Nose: denies epistaxis or masses   Throat: denies sore throat or trouble swallowing. Neuro: denies numbness, tingling, tremors. Skin: denies rashes or itching. : denies hematuria, dysuria   GI: denies vomiting, diarrhea   Psych: denies mood changed, anxiety, depression. Physical Exam   BP (!) 152/56   Pulse 86   Temp 97.9 °F (36.6 °C) (Oral)   Resp 16   Ht 5' 5\" (1.651 m)   Wt 150 lb 8 oz (68.3 kg)   SpO2 96%   BMI 25.04 kg/m²   Constitutional: Oriented to person, place, and time. No distress. Well developed. Head: Normocephalic and atraumatic. Neck: Neck supple. No hepatojugular reflux. No JVD present. Carotid bruit is not present. No tracheal deviation present. No thyromegaly present. Cardiovascular: Normal rate, regular rhythm, normal heart sounds. intact distal pulses. No gallop and no friction rub. No murmur heard. Pulmonary: Breath sounds normal. No respiratory distress. No wheezes. No rales.    Chest: Effort normal. No tenderness. Abdominal: Soft. Bowel sounds are normal. No distension or mass. No tenderness, rebound or guarding. Musculoskeletal: . No tenderness. No clubbing or cyanosis. Extremitites: Intact distal pulses. No edema  Neurological: Alert and oriented to person, place, and time. Skin: Skin is warm and dry. No rash noted. Not diaphoretic. No erythema. Psychiatric: Normal mood and affect. Behavior is normal.   Lymphadenopathy: No cervical adenopathy. No groin adenopathy. CBC:   Lab Results   Component Value Date    WBC 6.7 05/25/2022    RBC 3.79 05/25/2022    HGB 11.9 05/25/2022    HCT 37.3 05/25/2022    MCV 98.4 05/25/2022    MCH 31.4 05/25/2022    MCHC 31.9 05/25/2022    RDW 14.2 05/25/2022     05/25/2022    MPV 11.8 05/25/2022     BMP:   Lab Results   Component Value Date     05/25/2022    K 3.3 05/25/2022    K 4.1 05/24/2022     05/25/2022    CO2 23 05/25/2022    BUN 16 05/25/2022    LABALBU 3.1 05/24/2022    CREATININE 1.5 05/25/2022    CALCIUM 8.3 05/25/2022    GFRAA 42 05/25/2022    LABGLOM 42 05/25/2022     Magnesium:    Lab Results   Component Value Date    MG 1.5 05/25/2022     Cardiac Enzymes:   Lab Results   Component Value Date    CKTOTAL 323 (H) 11/03/2013    CKMB 1.6 11/03/2013    TROPHS 56 (H) 05/24/2022    TROPHS 58 (H) 05/24/2022      PT/INR:    Lab Results   Component Value Date    PROTIME 11.1 11/03/2013    INR 1.0 11/03/2013     TSH:  No results found for: TSH    Rhythm Strip: normal sinus rhythm.     TTE 8/11/21:  Normal LV size with low normal to mildly reduced LVEF  Heavily calcified and severely restricted aortic valve with severe aortic stenosis (V-max 4.1, mean gradient 34) and moderate to severe aortic insufficiency  Mild to moderate MR  Normal RV size and systolic function  No significant TR    ASSESSMENT & PLAN:    Patient Active Problem List   Diagnosis    Hypertension    Mild intermittent asthma without complication    Pure hypercholesterolemia    Tobacco abuse    Acute decompensated heart failure ([facility] [address]    Acute combined systolic and diastolic congestive heart failure ([facility] [address]     1. 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 Echo pending. Gently diurese. Further recommendations based on echo and clinical course. 2. VHD: Known severe AS with mild to mod AI and MR by 8/11/2021 echo. Echo pending. 3. Elevated troponin:     Will need cath prior to valvular intervention. ASA/BB/statin. 4. HTN: Observe. 5. CKD: Follow labs. 6. Asthma    7. Tobacco use. Dangelo Burton D.O.   Cardiologist  Cardiology, [address] [facility]

## 2022-05-26 VITALS
RESPIRATION RATE: 19 BRPM | SYSTOLIC BLOOD PRESSURE: 178 MMHG | HEIGHT: 65 IN | BODY MASS INDEX: 25.08 KG/M2 | HEART RATE: 80 BPM | DIASTOLIC BLOOD PRESSURE: 52 MMHG | TEMPERATURE: 98 F | WEIGHT: 150.5 LBS | OXYGEN SATURATION: 100 %

## 2022-05-26 LAB
ALBUMIN SERPL-MCNC: 2.5 G/DL (ref 3.5–5.2)
ALP BLD-CCNC: 64 U/L (ref 35–104)
ALT SERPL-CCNC: 61 U/L (ref 0–32)
ANION GAP SERPL CALCULATED.3IONS-SCNC: 9 MMOL/L (ref 7–16)
AST SERPL-CCNC: 21 U/L (ref 0–31)
BILIRUB SERPL-MCNC: 0.6 MG/DL (ref 0–1.2)
BUN BLDV-MCNC: 14 MG/DL (ref 6–23)
CALCIUM SERPL-MCNC: 8.1 MG/DL (ref 8.6–10.2)
CHLORIDE BLD-SCNC: 103 MMOL/L (ref 98–107)
CO2: 27 MMOL/L (ref 22–29)
CREAT SERPL-MCNC: 1.4 MG/DL (ref 0.5–1)
GFR AFRICAN AMERICAN: 46
GFR NON-AFRICAN AMERICAN: 46 ML/MIN/1.73
GLUCOSE BLD-MCNC: 86 MG/DL (ref 74–99)
MAGNESIUM: 1.8 MG/DL (ref 1.6–2.6)
POTASSIUM SERPL-SCNC: 3.7 MMOL/L (ref 3.5–5)
SODIUM BLD-SCNC: 139 MMOL/L (ref 132–146)
TOTAL PROTEIN: 5.3 G/DL (ref 6.4–8.3)

## 2022-05-26 PROCEDURE — 6370000000 HC RX 637 (ALT 250 FOR IP): Performed by: INTERNAL MEDICINE

## 2022-05-26 PROCEDURE — 99233 SBSQ HOSP IP/OBS HIGH 50: CPT | Performed by: INTERNAL MEDICINE

## 2022-05-26 PROCEDURE — 83735 ASSAY OF MAGNESIUM: CPT

## 2022-05-26 PROCEDURE — 6360000002 HC RX W HCPCS: Performed by: INTERNAL MEDICINE

## 2022-05-26 PROCEDURE — 2580000003 HC RX 258: Performed by: INTERNAL MEDICINE

## 2022-05-26 PROCEDURE — 80053 COMPREHEN METABOLIC PANEL: CPT

## 2022-05-26 PROCEDURE — 36415 COLL VENOUS BLD VENIPUNCTURE: CPT

## 2022-05-26 RX ORDER — SPIRONOLACTONE 25 MG/1
25 TABLET ORAL DAILY
Qty: 30 TABLET | Refills: 0 | Status: SHIPPED | OUTPATIENT
Start: 2022-05-27 | End: 2022-06-10 | Stop reason: SDUPTHER

## 2022-05-26 RX ORDER — METOPROLOL SUCCINATE 100 MG/1
100 TABLET, EXTENDED RELEASE ORAL 2 TIMES DAILY
Status: DISCONTINUED | OUTPATIENT
Start: 2022-05-26 | End: 2022-05-26 | Stop reason: HOSPADM

## 2022-05-26 RX ORDER — NICOTINE 21 MG/24HR
1 PATCH, TRANSDERMAL 24 HOURS TRANSDERMAL DAILY
Qty: 30 PATCH | Refills: 0 | Status: SHIPPED | OUTPATIENT
Start: 2022-05-27 | End: 2022-07-12

## 2022-05-26 RX ORDER — NICOTINE 21 MG/24HR
1 PATCH, TRANSDERMAL 24 HOURS TRANSDERMAL DAILY
Qty: 30 PATCH | Refills: 0 | Status: SHIPPED | OUTPATIENT
Start: 2022-05-27 | End: 2022-05-26

## 2022-05-26 RX ORDER — SPIRONOLACTONE 25 MG/1
25 TABLET ORAL DAILY
Status: DISCONTINUED | OUTPATIENT
Start: 2022-05-26 | End: 2022-05-26 | Stop reason: HOSPADM

## 2022-05-26 RX ORDER — METOPROLOL SUCCINATE 100 MG/1
100 TABLET, EXTENDED RELEASE ORAL 2 TIMES DAILY
Qty: 30 TABLET | Refills: 0 | Status: SHIPPED | OUTPATIENT
Start: 2022-05-26 | End: 2022-06-13

## 2022-05-26 RX ADMIN — SACUBITRIL AND VALSARTAN 1 TABLET: 24; 26 TABLET, FILM COATED ORAL at 08:35

## 2022-05-26 RX ADMIN — ASPIRIN 81 MG: 81 TABLET, COATED ORAL at 08:34

## 2022-05-26 RX ADMIN — METOPROLOL SUCCINATE 100 MG: 100 TABLET, FILM COATED, EXTENDED RELEASE ORAL at 08:34

## 2022-05-26 RX ADMIN — ENOXAPARIN SODIUM 40 MG: 100 INJECTION SUBCUTANEOUS at 08:38

## 2022-05-26 RX ADMIN — FUROSEMIDE 40 MG: 10 INJECTION, SOLUTION INTRAMUSCULAR; INTRAVENOUS at 08:37

## 2022-05-26 RX ADMIN — SODIUM CHLORIDE, PRESERVATIVE FREE 10 ML: 5 INJECTION INTRAVENOUS at 08:36

## 2022-05-26 RX ADMIN — SPIRONOLACTONE 25 MG: 25 TABLET ORAL at 08:35

## 2022-05-26 RX ADMIN — ALLOPURINOL 100 MG: 100 TABLET ORAL at 08:34

## 2022-05-26 NOTE — CARE COORDINATION
Care Coordination  Noted discharge order. The plan is for the patient to return home today. Her ride home is her   And she has no addition discharge needs.

## 2022-05-26 NOTE — DISCHARGE SUMMARY
Hospitalist Discharge Summary    Patient ID: Tania Gabriel   Patient : 1955  Patient's PCP: Alaina Caba PA-C    Admit Date: 2022   Admitting Physician: Donta Boothe MD    Discharge Date:  2022   Discharge Physician: AILEEN Dowling NP   Discharge Condition: Stable  Discharge Disposition: [facility] course in brief:  (Please refer to daily progress notes for a comprehensive review of the hospitalization by requesting medical records)    Patient admitted for acutely decompensated HF.     Arthur Lennon presented to ED with worsening b/l leg swelling and dyspnea on exertion per direction from her PCP. Upon presentation to the ED vitals were reassuring with the exception of hypertension of 193/71. Troponin was elevated. Pt was given 324 mg of aspirin and 40 mg of lasix and admitted for further evaluation and treatment. Patient reports improvement in her SOB with lasix administration. Cardiology was consulted and she was diuresed. She was initiated on Entresto + spironolactone. She is now stable for discharge home with OP f/u. Consults:   IP CONSULT TO PRIMARY CARE PROVIDER  IP CONSULT TO HEART FAILURE NURSE/COORDINATOR  IP CONSULT TO DIETITIAN  IP CONSULT TO CARDIOLOGY    Discharge Diagnoses:  Decompensated HF  NSVT- 13 beats on   VHD - severe AS, mild to mod AI + MR  Transaminitis likely 2/2 hepatic congestion - resolving  Elevated troponin  HTN, uncontrolled  CKD, baseline cr seems to be around 1.3   HTN  Asthma  Tobacco abuse    Discharge Instructions / Follow up: Follow-up with PCP within 1 week of discharge. Follow-up with consultants as indicated by them. Compliance with medications as prescribed on discharge.     Future Appointments   Date Time Provider Chin Wesley   2022 10:00 AM AVA [facility]   2022 12:30 PM [facility] CHF ROOM 1 YZ CHF [facility]   6/15/2022  1:00 PM SCHEDULE, YZ AUDIOLOGY YZ AUDIO [facility]   2022  8:30 AM [facility] CVL SPECIAL PROCEDURES LAB SEYZ CAROLYN [place]   6/22/2022  9:30 AM Thomasenia Bannock, APRN - CNP [place] CARDIO [facility]   5/3/2023 10:30 AM AVA [facility]       The patient's condition is stable. At this time the patient is without objective evidence of an acute process requiring continuing hospitalization or inpatient management. They are stable for discharge with outpatient follow-up. I have spoken with the patient and discussed the results of the current hospitalization, in addition to providing specific details for the plan of care and counseling regarding the diagnosis and prognosis. The plan has been discussed in detail and they are aware of the specific conditions for emergent return, as well as the importance of follow-up. Their questions are answered at this time and they are agreeable with the plan for discharge home. Continued appropriate risk factor modification of blood pressure, diabetes and serum lipids will remain essential to reducing risk of future atherosclerotic development    Activity: activity as tolerated    Physical exam:  General appearance: Elderly female in no apparent distress, appears stated age and cooperative. HEENT: Conjunctivae/corneas clear. Mucous membranes moist.  Neck: Supple. No JVD. Respiratory:  CTA throughout. Normal respiratory effort. Cardiovascular:  RRR. S1, S2 without MRG. PV: Pulses palpable. +1 pitting edema. Abdomen: Soft, non-tender, non-distended. +BS  Musculoskeletal: No obvious deformities. Skin: Normal skin color. No rashes or lesions. Good turgor. Neurologic:  Grossly non-focal. Awake, alert, following commands.    Psychiatric: Alert and oriented, thought content appropriate, normal insight and judgement    Significant labs:  CBC:   Recent Labs     05/24/22  1005 05/25/22  0444   WBC 7.8 6.7   RBC 4.19 3.79   HGB 13.0 11.9   HCT 40.7 37.3   MCV 97.1 98.4   RDW 14.6 14.2    166     BMP:   Recent Labs 05/24/22  1005 05/25/22  0444 05/26/22  0505    141 139   K 4.1 3.3* 3.7    105 103   CO2 23 23 27   BUN 18 16 14   CREATININE 1.4* 1.5* 1.4*   MG  --  1.5* 1.8     LFT:  Recent Labs     05/24/22  1005 05/26/22  0505   PROT 6.0* 5.3*   ALKPHOS 79 64   * 61*   AST 35* 21   BILITOT 0.7 0.6     PT/INR: No results for input(s): INR, APTT in the last 72 hours. BNP: No results for input(s): BNP in the last 72 hours. Hgb A1C: No results found for: LABA1C  Folate and B12: No results found for: Elizabeth Hensley, No results found for: FOLATE  Thyroid Studies: No results found for: TSH, D3DTFDS, A6WXEBG, THYROIDAB    Urinalysis:    Lab Results   Component Value Date    NITRU NEGATIVE 11/03/2013    WBCUA NONE 11/03/2013    BACTERIA NONE 11/03/2013    RBCUA NONE 11/03/2013    BLOODU trace 04/15/2022    BLOODU TRACE 11/03/2013    SPECGRAV 1.025 04/15/2022    SPECGRAV 1.020 11/03/2013    GLUCOSEU neg 04/15/2022    GLUCOSEU NEGATIVE 11/03/2013       Imaging:  Echo Complete    Result Date: 5/25/2022  Transthoracic Echocardiography Report (TTE)  Demographics   Patient Name    Daniel Paulino Gender            Female   Medical Record  [id number]     Room Number       9759  Number   Account #       [de-identified]    Procedure Date    05/25/2022   Corporate ID                 Ordering          Sona Watkins DO                               Physician   Accession       [id number]   Referring  Number                       Physician   Date of Birth   1955   Sonographer       Sharon Lanza   Age             74 year(s)   Interpreting      [address]                               Physician         Physician Cardiology                                                 Sona Watkins DO                                Any Other  Procedure Type of Study   TTE procedure:Echo Complete W/Doppler & Color Flow.   Procedure Date Date: 05/25/2022 Start: 10:22 AM Study Location: Portable Technical Quality: Good visualization Indications:Congestive heart failure and Aortic stenosis. Patient Status: Routine Height: 65 inches Weight: 150 pounds BSA: 1.75 m^2 BMI: 24.96 kg/m^2 BP: 159/49 mmHg  Findings   Left Ventricle  Ejection fraction is visually estimated at 30%. Overall ejection fraction  severely decreased. Severe left ventricular concentric hypertrophy noted. Left ventricle size is normal. There is doppler evidence of stage III  diastolic dysfunction. Right Ventricle  Dilated right ventricle with reduced function. Left Atrium  Left atrial volume index of 35 ml per meters squared BSA. Right Atrium  Normal right atrium. Mitral Valve  Mild thickening of the mitral valve leaflets. No evidence of mitral valve  stenosis. Moderate mitral regurgitation is present. Tricuspid Valve  The tricuspid valve appears structurally normal.  Mild tricuspid regurgitation. RVSP is 37 mmHg. Aortic Valve  The aortic valve is trileaflet. Severe aortic stenosis is present. The  aortic valve area is 0.7 cm2 with a maximum gradient of 77 mmHg and a mean  gradient of 40 mmHg. Moderate-to-severe aortic regurgitation is noted. Aortic valve pressure half-time 271 ms. Pulmonic Valve  Pulmonic valve is structurally normal. Physiologic and/or trace pulmonic  regurgitation present. Pericardial Effusion  No evidence for hemodynamically significant pericardial effusion. Aorta  Normal aortic root and ascending aorta. Miscellaneous  The inferior vena cava diameter is normal with normal respiratory  variation. Conclusions   Summary  Ejection fraction is visually estimated at 30%. Overall ejection fraction severely decreased. Severe left ventricular concentric hypertrophy noted. There is doppler evidence of stage III diastolic dysfunction. Dilated right ventricle with reduced function. Left atrial volume index of 35 ml per meters squared BSA. Severe aortic stenosis is present.   The aortic valve area is 0.7 cm2 with a maximum gradient of 77 mmHg and a  mean gradient of 40 mmHg. Moderate-to-severe aortic regurgitation is noted. Aortic valve pressure  half-time 271 ms. Moderate mitral regurgitation is present. Mild tricuspid regurgitation. RVSP is 37 mmHg. No evidence for hemodynamically significant pericardial effusion.    Signature   ----------------------------------------------------------------  Electronically signed by Kylie Owens DO(Interpreting  physician) on 05/25/2022 11:59 AM  ----------------------------------------------------------------  M-Mode/2D Measurements & Calculations   LV Diastolic   LV Systolic Dimension: 4.4   AV Cusp Separation: 0.6 cmLA  Dimension: 5   cm                           Dimension: 3.9 cmAO Root  cm             LV Volume Diastolic: 950.5   Dimension: 2.8 cm  LV FS:12 %     ml  LV PW          LV Volume Systolic: 31.1 ml  Diastolic: 1.7 LV EDV/LV EDV Index: 117.7  cm             ml/67 ml/m^2LV ESV/LV ESV    RV Diastolic Dimension: 2.9 cm  Septum         Index: 85.7 BA/28MC/ m^2  Diastolic: 1.8 EF Calculated: 27.2 %        Ascending Aorta: 2.6 cm  cm             LV Mass Index: 233 l/min*m^2 LA volume/Index: 61.5 ml                                              /35.12ml/m^2  LV Mass:                                    RA Area: 12.7 cm^2  407.55 g       LVOT: 2 cm  Doppler Measurements & Calculations   MV Peak E-Wave: 0.91 AV Peak Velocity: 4.4  LVOT Peak Velocity: 0.9 m/s  m/s                  m/s                    LVOT Mean Velocity: 0.57 m/s  MV Peak A-Wave: 0.58 AV Peak Gradient:      LVOT Peak Gradient: 3.2  m/s                  77.48 mmHg             mmHgLVOT Mean Gradient: 1.5  MV E/A Ratio: 1.58   AV Mean Velocity: 2.93 mmHg  MV Peak Gradient:    m/s                    Estimated RVSP: 37.3 mmHg  3.2 mmHg             AV Mean Gradient: 40.4 Estimated RAP:3 mmHg  MV Mean Gradient: 1  mmHg  mmHg                 AV VTI: 95.2 cm  MV Mean Velocity:    AV Area                TR Velocity:2.93 m/s  0.44 m/s (Continuity):0.67 cm^2 TR Gradient:34.29 mmHg  MV Deceleration      AV Deceleration Time:  PV Peak Velocity: 0.65 m/s  Time: 155.2 msec     933.9 msec             PV Peak Gradient: 1.67 mmHg  MV P1/2t: 55.7 msec  LVOT VTI: 20.4 cm      PV Mean Velocity: 0.45 m/s  MVA by PHT:3.95 cm^2                        PV Mean Gradient: 0.9 mmHg  MV Area              Estimated PASP: 37.29  (continuity): 3.1    mmHg  cm^2  MV E' Septal         Pulm. Vein D  Velocity: 0.04 m/s   Velocity:0.95 m/s  MV E' Lateral        Pulm. Vein S Velocity:  Velocity: 5 m/s      0.32 m/s  [email or web address]    XR CHEST PORTABLE    Result Date: 5/24/2022  EXAMINATION: ONE XRAY VIEW OF THE CHEST 5/24/2022 10:10 am COMPARISON: 11/03/2013 HISTORY: ORDERING SYSTEM PROVIDED HISTORY: shortness of breath, eval for CHF TECHNOLOGIST PROVIDED HISTORY: Reason for exam:->shortness of breath, eval for CHF What reading provider will be dictating this exam?->CRC FINDINGS: Left heart appears prominent, slight pulmonary venous hypertension. No evidence of pulmonary edema. Right convexity thoracic scoliosis unchanged. Lungs are clear. No pleural fluid or focal pneumonia. 1.  Prominent left heart and pulmonary venous hypertension. Findings suggest minimal CHF/volume overload. 2.  No evidence of alveolar consolidation. US DUP LOWER EXTREMITY LEFT LUIS    Result Date: 5/23/2022  EXAMINATION: DUPLEX VENOUS ULTRASOUND OF THE LEFT LOWER EXTREMITY 5/23/2022 2:26 pm TECHNIQUE: Duplex ultrasound using B-mode/gray scaled imaging and Doppler spectral analysis and color flow was obtained of the deep venous structures of the left lower extremity. COMPARISON: None.  HISTORY: ORDERING SYSTEM PROVIDED HISTORY: Left leg swelling TECHNOLOGIST PROVIDED HISTORY: Reason for exam:->edema, pain What reading provider will be dictating this exam?->CRC FINDINGS: The visualized veins of the left lower extremity are patent and free of echogenic thrombus. The veins demonstrate good compressibility with normal color flow study and spectral analysis. No evidence of DVT in the left lower extremity.  RECOMMENDATIONS: Unavailable       Discharge Medications:      Medication List      START taking these medications    metoprolol succinate 100 MG extended release tablet  Commonly known as: TOPROL XL  Take 1 tablet by mouth in the morning and at bedtime     nicotine 14 MG/24HR  Commonly known as: [address] [facility] 1 patch onto the skin daily  Start taking on: May 27, 2022     sacubitril-valsartan 24-26 MG per tablet  Commonly known as: ENTRESTO  Take 1 tablet by mouth 2 times daily  Start taking on: May 28, 2022     spironolactone 25 MG tablet  Commonly known as: ALDACTONE  Take 1 tablet by mouth daily  Start taking on: May 27, 2022        Carlos Manuel Go taking these medications    albuterol sulfate  (90 Base) MCG/ACT inhaler  Inhale 2 puffs into the lungs every 4 hours as needed for Wheezing     allopurinol 100 MG tablet  Commonly known as: ZYLOPRIM     aspirin 81 MG EC tablet     atorvastatin 40 MG tablet  Commonly known as: LIPITOR     loratadine 10 MG tablet  Commonly known as: CLARITIN        STOP taking these medications    lisinopril 10 MG tablet  Commonly known as: PRINIVIL;ZESTRIL     metoprolol tartrate 25 MG tablet  Commonly known as: LOPRESSOR     montelukast 10 MG tablet  Commonly known as: SINGULAIR           Where to Get Your Medications      You can get these medications from any pharmacy    Bring a paper prescription for each of these medications  · metoprolol succinate 100 MG extended release tablet  · nicotine 14 MG/24HR  · sacubitril-valsartan 24-26 MG per tablet  · spironolactone 25 MG tablet         Time Spent on discharge is more than 45 minutes in the examination, evaluation, counseling and review of medications and discharge plan.    +++++++++++++++++++++++++++++++++++++++++++++++++  AILEEN Jay - NP  [facility] Physician - [address] [place] [address]  +++++++++++++++++++++++++++++++++++++++++++++++++  NOTE: This report was transcribed using voice recognition software. Every effort was made to ensure accuracy; however, inadvertent computerized transcription errors may be present.

## 2022-05-26 NOTE — PROGRESS NOTES
CLINICAL PHARMACY NOTE: MEDS TO BEDS    Total # of Prescriptions Filled: 3   The following medications were delivered to the patient:  · metprolol  mg  · Entresto 24-26 mg  · Spironolactone 25 mg    Additional Documentation:  Delivered meds

## 2022-05-26 NOTE — PLAN OF CARE
Patient's chart updated to reflect:      . - HF care plan, HF education points and HF discharge instructions.  -Orders: 2 gram sodium diet, daily weights, I/O.  -PCP and cardiology follow up appointments to be scheduled within 7 days of hospital discharge. -CHF education session will be provided to the patient prior to hospital discharge.     Lilia Olmos RN RN, BSN  Heart Failure Navigator
Problem: Discharge Planning  Goal: Discharge to home or other facility with appropriate resources  Outcome: Completed     Problem: Pain  Goal: Verbalizes/displays adequate comfort level or baseline comfort level  Outcome: Completed     Problem: ABCDS Injury Assessment  Goal: Absence of physical injury  Outcome: Completed     Problem: Safety - Adult  Goal: Free from fall injury  Outcome: Completed     Problem: Cardiovascular - Adult  Goal: Maintains optimal cardiac output and hemodynamic stability  Outcome: Completed     Problem: Metabolic/Fluid and Electrolytes - Adult  Goal: Hemodynamic stability and optimal renal function maintained  Outcome: Completed     Problem: Chronic Conditions and Co-morbidities  Goal: Patient's chronic conditions and co-morbidity symptoms are monitored and maintained or improved  Outcome: Completed
Problem: Discharge Planning  Goal: Discharge to home or other facility with appropriate resources  Outcome: Progressing  Flowsheets (Taken 5/25/2022 0900)  Discharge to home or other facility with appropriate resources: Identify barriers to discharge with patient and caregiver     Problem: Pain  Goal: Verbalizes/displays adequate comfort level or baseline comfort level  Outcome: Progressing  Flowsheets (Taken 5/25/2022 0802)  Verbalizes/displays adequate comfort level or baseline comfort level: Encourage patient to monitor pain and request assistance     Problem: ABCDS Injury Assessment  Goal: Absence of physical injury  Outcome: Progressing  Flowsheets (Taken 5/25/2022 0930)  Absence of Physical Injury: Implement safety measures based on patient assessment     Problem: Safety - Adult  Goal: Free from fall injury  Outcome: Progressing  Flowsheets (Taken 5/25/2022 0930)  Free From Fall Injury: Instruct family/caregiver on patient safety     Problem: Cardiovascular - Adult  Goal: Maintains optimal cardiac output and hemodynamic stability  Outcome: Progressing  Flowsheets (Taken 5/25/2022 0900)  Maintains optimal cardiac output and hemodynamic stability: Monitor blood pressure and heart rate     Problem: Chronic Conditions and Co-morbidities  Goal: Patient's chronic conditions and co-morbidity symptoms are monitored and maintained or improved  Outcome: Progressing  Flowsheets (Taken 5/25/2022 0900)  Care Plan - Patient's Chronic Conditions and Co-Morbidity Symptoms are Monitored and Maintained or Improved: Monitor and assess patient's chronic conditions and comorbid symptoms for stability, deterioration, or improvement     Problem: Metabolic/Fluid and Electrolytes - Adult  Goal: Hemodynamic stability and optimal renal function maintained  Outcome: Progressing  Flowsheets (Taken 5/25/2022 0900)  Hemodynamic stability and optimal renal function maintained:   Monitor labs and assess for signs and symptoms of volume
58

## 2022-05-26 NOTE — PROGRESS NOTES
[address] [facility] Cardiology Inpatient Progress Note    Patient is a 74 y.o. female of [place], [place] seen in hospital follow up. Chief complaint: CHF/AS    HPI: Improved SOB. No CP.      Patient Active Problem List   Diagnosis    Hypertension    Mild intermittent asthma without complication    Pure hypercholesterolemia    Tobacco abuse    Acute decompensated heart failure ([address]    Acute combined systolic and diastolic congestive heart failure (HCC)       Allergies   Allergen Reactions    Pcn [Penicillins]        Current Facility-Administered Medications   Medication Dose Route Frequency Provider Last Rate Last Admin    spironolactone (ALDACTONE) tablet 25 mg  25 mg Oral Daily [place] Barbara, DO   25 mg at 05/26/22 1665    metoprolol succinate (TOPROL XL) extended release tablet 100 mg  100 mg Oral BID [place] Barbara, DO   100 mg at 05/26/22 4734    sacubitril-valsartan (ENTRESTO) 24-26 MG per tablet 1 tablet  1 tablet Oral BID [place] Barbara, DO   1 tablet at 05/26/22 0835    magnesium sulfate 1000 mg in dextrose 5% 100 mL IVPB  1,000 mg IntraVENous PRN AILEEN Nguyễn - NP   Stopped at 05/25/22 1306    sodium chloride flush 0.9 % injection 5-40 mL  5-40 mL IntraVENous 2 times per day Agnes De Leon MD   10 mL at 05/26/22 0836    sodium chloride flush 0.9 % injection 5-40 mL  5-40 mL IntraVENous PRN Agnes De Leon MD   10 mL at 05/25/22 1744    0.9 % sodium chloride infusion   IntraVENous PRN Agnes De Leon MD        ondansetron (ZOFRAN-ODT) disintegrating tablet 4 mg  4 mg Oral Q8H PRN Agnes De Leon MD        Or    ondansetron (ZOFRAN) injection 4 mg  4 mg IntraVENous Q6H PRN Agnes De Leon MD        polyethylene glycol (GLYCOLAX) packet 17 g  17 g Oral Daily PRN Agnes De Leon MD        acetaminophen (TYLENOL) tablet 650 mg  650 mg Oral Q6H PRN Agnes De Leon MD        Or    acetaminophen (TYLENOL) suppository 650 mg  650 mg Rectal Q6H PRN Agnes De Leon MD        enoxaparin (LOVENOX) injection 40 mg  40 mg SubCUTAneous Daily Agnes De Leon MD   40 mg at 05/26/22 4591    perflutren lipid microspheres (DEFINITY) injection 1.65 mg  1.5 mL IntraVENous ONCE PRN Agnes De Leon MD        potassium chloride (KLOR-CON M) extended release tablet 40 mEq  40 mEq Oral PRN Agnes De Leon MD        Or    potassium bicarb-citric acid (EFFER-K) effervescent tablet 40 mEq  40 mEq Oral PRN Agnes De Leon MD   40 mEq at 05/25/22 0956    Or    potassium chloride 10 mEq/100 mL IVPB (Peripheral Line)  10 mEq IntraVENous PRN Agnes De Leon MD        allopurinol (ZYLOPRIM) tablet 100 mg  100 mg Oral BID Agnes De Leon MD   100 mg at 05/26/22 0834    aspirin EC tablet 81 mg  81 mg Oral Daily Agnes De Leon MD   81 mg at 05/26/22 0834    atorvastatin (LIPITOR) tablet 40 mg  40 mg Oral Nightly Agnes De Leon MD   40 mg at 05/25/22 2025    furosemide (LASIX) injection 40 mg  40 mg IntraVENous BID Agnes De Leon MD   40 mg at 05/26/22 0837    albuterol (PROVENTIL) nebulizer solution 2.5 mg  2.5 mg Nebulization Q4H PRN Agnes De Leon MD   2.5 mg at 05/25/22 2113    nicotine (NICODERM CQ) 14 MG/24HR 1 patch  1 patch TransDERmal Daily Zoya Montes De Oca MD   1 patch at 05/26/22 9998       Review of systems:   Heart: as above   Lungs: as above   Eyes: denies changes in vision or discharge. Ears: denies changes in hearing or pain. Nose: denies epistaxis or masses   Throat: denies sore throat or trouble swallowing. Neuro: denies numbness, tingling, tremors. Skin: denies rashes or itching. : denies hematuria, dysuria   GI: denies vomiting, diarrhea   Psych: denies mood changed, anxiety, depression. Physical Exam   BP (!) 178/52   Pulse 80   Temp 98 °F (36.7 °C) (Oral)   Resp 19   Ht 5' 5\" (1.651 m)   Wt 150 lb 8 oz (68.3 kg)   SpO2 100%   BMI 25.04 kg/m²   Constitutional: Oriented to person, place, and time. No distress. Well developed. Head: Normocephalic and atraumatic. Neck: Neck supple. No hepatojugular reflux. No JVD present.  Carotid bruit is not present. No tracheal deviation present. No thyromegaly present. Cardiovascular: Normal rate, regular rhythm, normal heart sounds. intact distal pulses. No gallop and no friction rub. No murmur heard. Pulmonary: Breath sounds normal. No respiratory distress. No wheezes. No rales. Chest: Effort normal. No tenderness. Abdominal: Soft. Bowel sounds are normal. No distension or mass. No tenderness, rebound or guarding. Musculoskeletal: . No tenderness. No clubbing or cyanosis. Extremitites: Intact distal pulses. No edema  Neurological: Alert and oriented to person, place, and time. Skin: Skin is warm and dry. No rash noted. Not diaphoretic. No erythema. Psychiatric: Normal mood and affect. Behavior is normal.   Lymphadenopathy: No cervical adenopathy. No groin adenopathy. CBC:   Lab Results   Component Value Date    WBC 6.7 05/25/2022    RBC 3.79 05/25/2022    HGB 11.9 05/25/2022    HCT 37.3 05/25/2022    MCV 98.4 05/25/2022    MCH 31.4 05/25/2022    MCHC 31.9 05/25/2022    RDW 14.2 05/25/2022     05/25/2022    MPV 11.8 05/25/2022     BMP:   Lab Results   Component Value Date     05/26/2022    K 3.7 05/26/2022    K 4.1 05/24/2022     05/26/2022    CO2 27 05/26/2022    BUN 14 05/26/2022    LABALBU 2.5 05/26/2022    CREATININE 1.4 05/26/2022    CALCIUM 8.1 05/26/2022    GFRAA 46 05/26/2022    LABGLOM 46 05/26/2022     Magnesium:    Lab Results   Component Value Date    MG 1.8 05/26/2022     Cardiac Enzymes:   Lab Results   Component Value Date    CKTOTAL 323 (H) 11/03/2013    CKMB 1.6 11/03/2013    TROPHS 56 (H) 05/24/2022    TROPHS 58 (H) 05/24/2022      PT/INR:    Lab Results   Component Value Date    PROTIME 11.1 11/03/2013    INR 1.0 11/03/2013     TSH:  No results found for: TSH    Rhythm Strip: normal sinus rhythm.     TTE 8/11/21:  Normal LV size with low normal to mildly reduced LVEF  Heavily calcified and severely restricted aortic valve with severe aortic stenosis (V-max 4.1, mean gradient 34) and moderate to severe aortic insufficiency  Mild to moderate MR  Normal RV size and systolic function  No significant TR    Echo Summary 5/25/2022:   Ejection fraction is visually estimated at 30%. Overall ejection fraction severely decreased. Severe left ventricular concentric hypertrophy noted. There is doppler evidence of stage III diastolic dysfunction. Dilated right ventricle with reduced function. Left atrial volume index of 35 ml per meters squared BSA. Severe aortic stenosis is present. The aortic valve area is 0.7 cm2 with a maximum gradient of 77 mmHg and a mean gradient of 40 mmHg. Moderate-to-severe aortic regurgitation is noted. Aortic valve pressure half-time 271 ms. Moderate mitral regurgitation is present. Mild tricuspid regurgitation. RVSP is 37 mmHg. No evidence for hemodynamically significant pericardial effusion. ASSESSMENT & PLAN:    Patient Active Problem List   Diagnosis    Hypertension    Mild intermittent asthma without complication    Pure hypercholesterolemia    Tobacco abuse    Acute decompensated heart failure (HCC)    Acute combined systolic and diastolic congestive heart failure ([facility]     1. Acute on chronic systolic CHF:    Chart/labs reviewed. Echo as above with severe LV dysfunction. Diuresed. SOB improved. Switched to toprol. Transitioning ACEI to entresto (start Saturday) Started aldactone. 2. VHD: Known severe AS with mild to mod AI and MR by 8/11/2021 echo. Echo this admission with LVEF 30% with severe AS, mod-sev AI, mod MR and mild TR. Already scheduled for outpatient MICHAEL and heart cath for pre-op evaluation 6/16/2022. Insistent on discharge today so she can attend graduation of grandchild. 3. Elevated troponin:     Will need cath prior to valvular intervention. Treat medically for now. ASA/BB/statin. 4. HTN: Observe. 5. CKD: Follow labs. 6. Asthma    7.  Tobacco use.    Evan King D.O.   Cardiologist  Cardiology, [address] [facility]

## 2022-05-27 ENCOUNTER — TELEPHONE (OUTPATIENT)
Dept: PRIMARY CARE CLINIC | Age: 67
End: 2022-05-27

## 2022-05-27 NOTE — TELEPHONE ENCOUNTER
[facility] Transitions Initial Follow Up Call    Outreach made within 2 business days of discharge: Yes    Patient: Pool Higgins Patient : 1955   MRN: [id number]  Reason for Admission: Acute decompensated heart failure  Discharge Date: 22       Spoke with: Adriano Dai     Discharge department/facility: [facility]     TCM Interactive Patient Contact:  Was patient able to fill all prescriptions: Yes  Was patient instructed to bring all medications to the follow-up visit: Yes  Is patient taking all medications as directed in the discharge summary?  Yes  Does patient understand their discharge instructions: Yes  Does patient have questions or concerns that need addressed prior to 7-14 day follow up office visit: no    Scheduled appointment with PCP within 7-14 days    Follow Up  Future Appointments   Date Time Provider Chin Wesley   2022 10:00 AM AVA [facility]   2022 12:30 PM [facility] ROOM 1 SEYZ CHF [facility]   6/15/2022  1:00 PM SCHEDULE, SEYZ AUDIOLOGY YZ AUDIO [facility]   2022  8:30 AM [facility] CVL SPECIAL PROCEDURES LAB YZ CATH . [facility]   2022  9:30 AM AILEEN Kinsey CNP CARDIO [facility]   5/3/2023 10:30 AM AVA [facility]       Ashlee Sheriff Dear

## 2022-05-28 RX ORDER — METOPROLOL SUCCINATE 100 MG/1
100 TABLET, EXTENDED RELEASE ORAL 2 TIMES DAILY
Qty: 30 TABLET | Refills: 0 | Status: SHIPPED | OUTPATIENT
Start: 2022-06-11 | End: 2022-06-22 | Stop reason: SDUPTHER

## 2022-06-01 ENCOUNTER — OFFICE VISIT (OUTPATIENT)
Dept: PRIMARY CARE CLINIC | Age: 67
End: 2022-06-01
Payer: MEDICARE

## 2022-06-01 ENCOUNTER — TELEPHONE (OUTPATIENT)
Dept: PRIMARY CARE CLINIC | Age: 67
End: 2022-06-01

## 2022-06-01 VITALS
HEART RATE: 56 BPM | DIASTOLIC BLOOD PRESSURE: 60 MMHG | BODY MASS INDEX: 23.82 KG/M2 | RESPIRATION RATE: 16 BRPM | HEIGHT: 65 IN | WEIGHT: 143 LBS | TEMPERATURE: 97.2 F | SYSTOLIC BLOOD PRESSURE: 142 MMHG | OXYGEN SATURATION: 100 %

## 2022-06-01 DIAGNOSIS — Z09 HOSPITAL DISCHARGE FOLLOW-UP: ICD-10-CM

## 2022-06-01 DIAGNOSIS — Z72.0 TOBACCO ABUSE: ICD-10-CM

## 2022-06-01 DIAGNOSIS — I50.9 ACUTE DECOMPENSATED HEART FAILURE (HCC): ICD-10-CM

## 2022-06-01 DIAGNOSIS — I50.41 ACUTE COMBINED SYSTOLIC AND DIASTOLIC CONGESTIVE HEART FAILURE (HCC): Primary | ICD-10-CM

## 2022-06-01 DIAGNOSIS — I35.0 SEVERE AORTIC STENOSIS: ICD-10-CM

## 2022-06-01 DIAGNOSIS — I10 PRIMARY HYPERTENSION: ICD-10-CM

## 2022-06-01 PROCEDURE — 99496 TRANSJ CARE MGMT HIGH F2F 7D: CPT | Performed by: PHYSICIAN ASSISTANT

## 2022-06-01 PROCEDURE — 1111F DSCHRG MED/CURRENT MED MERGE: CPT | Performed by: PHYSICIAN ASSISTANT

## 2022-06-01 NOTE — TELEPHONE ENCOUNTER
There is gum and lozenge but as I told her, they likely wont cover those if they dont cover the patch. She needs to ask them what is covered on her plan.

## 2022-06-01 NOTE — TELEPHONE ENCOUNTER
----- Message from Pao Ferrell sent at 6/1/2022  3:19 PM EDT -----  Subject: Message to Provider    QUESTIONS  Information for Provider? Patient is stating that she called her insurance   company and she stated that her insurance company does not pay for the   patches to get her to quit smoking. She is wanting to know what else she   can do or take for this. Please call her back as soon as possible. Thank   you.  ---------------------------------------------------------------------------  --------------  CALL BACK INFO  What is the best way for the office to contact you? OK to leave message on   voicemail  Preferred Call Back Phone Number? [phone]  ---------------------------------------------------------------------------  --------------  SCRIPT ANSWERS  Relationship to Patient?  Self

## 2022-06-01 NOTE — PROGRESS NOTES
Post-Discharge Transitional Care  Follow Up      Linda Wheeler   YOB: 1955    Date of Office Visit:  6/1/2022  Date of Hospital Admission: 5/24/22  Date of Hospital Discharge: 5/26/22  Risk of hospital readmission (high >=14%. Medium >=10%) :Readmission Risk Score: 11.3 ( )      Care management risk score Rising risk (score 2-5) and Complex Care (Scores >=6): 2     Non face to face  following discharge, date last encounter closed (first attempt may have been earlier): 5/27/2022  4:00 PM    Call initiated 2 business days of discharge: Yes    ASSESSMENT/PLAN:   Hospital discharge follow-up  -     AL DISCHARGE MEDS RECONCILED W/ CURRENT OUTPATIENT MED LIST      Medical Decision Making: high complexity  Return in 1 month (on 7/1/2022). On this date 6/1/2022 I have spent 20 minutes reviewing previous notes, test results and face to face with the patient discussing the diagnosis and importance of compliance with the treatment plan as well as documenting on the day of the visit. Subjective:   HPI:  Follow up of Hospital problems/diagnosis(es): chf, htn, tobacco abuse, severe as    Inpatient course: Discharge summary reviewed- see chart. Interval history/Current status: sx improved. She has mild sob, much better than previously. Her le edema has resolved. She is tolerating meds well. She is taking them as directed and without s/e. Patient Active Problem List   Diagnosis    Hypertension    Mild intermittent asthma without complication    Pure hypercholesterolemia    Tobacco abuse    Acute decompensated heart failure ([address]    Acute combined systolic and diastolic congestive heart failure ([address]       Medications listed as ordered at the time of discharge from hospital     Medication List          Accurate as of June 1, 2022 10:17 AM. If you have any questions, ask your nurse or doctor.             CONTINUE taking these medications    albuterol sulfate  (90 Base) MCG/ACT inhaler  Inhale 2 puffs into the lungs every 4 hours as needed for Wheezing     allopurinol 100 MG tablet  Commonly known as: ZYLOPRIM     aspirin 81 MG EC tablet     atorvastatin 40 MG tablet  Commonly known as: LIPITOR     loratadine 10 MG tablet  Commonly known as: CLARITIN     * metoprolol succinate 100 MG extended release tablet  Commonly known as: TOPROL XL  Take 1 tablet by mouth in the morning and at bedtime     * metoprolol succinate 100 MG extended release tablet  Commonly known as: Toprol XL  Take 1 tablet by mouth in the morning and at bedtime  Start taking on: June 11, 2022     nicotine 14 MG/24HR  Commonly known as: NICODERM CQ  Place 1 patch onto the skin daily     sacubitril-valsartan 24-26 MG per tablet  Commonly known as: ENTRESTO  Take 1 tablet by mouth 2 times daily     spironolactone 25 MG tablet  Commonly known as: ALDACTONE  Take 1 tablet by mouth daily         * This list has 2 medication(s) that are the same as other medications prescribed for you. Read the directions carefully, and ask your doctor or other care provider to review them with you.                   Medications marked \"taking\" at this time  Outpatient Medications Marked as Taking for the 6/1/22 encounter (Office Visit) with Gallito Garcia PA-C   Medication Sig Dispense Refill    [START ON 6/11/2022] metoprolol succinate (TOPROL XL) 100 MG extended release tablet Take 1 tablet by mouth in the morning and at bedtime 30 tablet 0    metoprolol succinate (TOPROL XL) 100 MG extended release tablet Take 1 tablet by mouth in the morning and at bedtime 30 tablet 0    spironolactone (ALDACTONE) 25 MG tablet Take 1 tablet by mouth daily 30 tablet 0    nicotine (NICODERM CQ) 14 MG/24HR Place 1 patch onto the skin daily 30 patch 0    sacubitril-valsartan (ENTRESTO) 24-26 MG per tablet Take 1 tablet by mouth 2 times daily 60 tablet 0    allopurinol (ZYLOPRIM) 100 MG tablet Take 100 mg by mouth 2 times daily      atorvastatin (LIPITOR) 40 MG tablet Take 40 mg by mouth at bedtime      loratadine (CLARITIN) 10 MG tablet Take 10 mg by mouth daily      aspirin 81 MG EC tablet Take 81 mg by mouth daily      albuterol sulfate  (90 Base) MCG/ACT inhaler Inhale 2 puffs into the lungs every 4 hours as needed for Wheezing 18 g 5        Medications patient taking as of now reconciled against medications ordered at time of hospital discharge: Yes    A comprehensive review of systems was negative except for what was noted in the HPI.     Objective:    BP (!) 142/60   Pulse 56   Temp 97.2 °F (36.2 °C)   Resp 16   Ht 5' 5\" (1.651 m)   Wt 143 lb (64.9 kg)   SpO2 100%   BMI 23.80 kg/m²   General Appearance: alert and oriented to person, place and time, well developed and well- nourished, in no acute distress  Skin: warm and dry, no rash or erythema  Head: normocephalic and atraumatic  Eyes: pupils equal, round, and reactive to light, extraocular eye movements intact, conjunctivae normal  ENT: tympanic membrane, external ear and ear canal normal bilaterally, nose without deformity, nasal mucosa and turbinates normal without polyps  Neck: supple and non-tender without mass, no thyromegaly or thyroid nodules, no cervical lymphadenopathy  Pulmonary/Chest: clear to auscultation bilaterally- no wheezes, rales or rhonchi, normal air movement, no respiratory distress  Cardiovascular: normal rate, regular rhythm, normal S1 and S2, + murmurs, rubs, clicks, or gallops, distal pulses intact, no carotid bruits  Abdomen: soft, non-tender, non-distended, normal bowel sounds, no masses or organomegaly  Extremities: no cyanosis, clubbing or edema  Musculoskeletal: normal range of motion, no joint swelling, deformity or tenderness  Neurologic: reflexes normal and symmetric, no cranial nerve deficit, gait, coordination and speech normal      An electronic signature was used to authenticate this note.  --Preeti Austin PA-C

## 2022-06-10 RX ORDER — SPIRONOLACTONE 25 MG/1
25 TABLET ORAL DAILY
Qty: 30 TABLET | Refills: 0 | Status: SHIPPED
Start: 2022-06-10 | End: 2022-06-22 | Stop reason: SDUPTHER

## 2022-06-13 ENCOUNTER — TELEPHONE (OUTPATIENT)
Dept: CARDIOLOGY CLINIC | Age: 67
End: 2022-06-13

## 2022-06-13 ENCOUNTER — HOSPITAL ENCOUNTER (OUTPATIENT)
Dept: OTHER | Age: 67
Setting detail: THERAPIES SERIES
Discharge: HOME | End: 2022-06-13
Payer: MEDICARE

## 2022-06-13 VITALS
BODY MASS INDEX: 23.46 KG/M2 | WEIGHT: 141 LBS | DIASTOLIC BLOOD PRESSURE: 75 MMHG | RESPIRATION RATE: 18 BRPM | HEART RATE: 74 BPM | SYSTOLIC BLOOD PRESSURE: 174 MMHG | OXYGEN SATURATION: 94 %

## 2022-06-13 DIAGNOSIS — I50.9 ACUTE DECOMPENSATED HEART FAILURE (HCC): Primary | ICD-10-CM

## 2022-06-13 LAB
ANION GAP SERPL CALCULATED.3IONS-SCNC: 10 MMOL/L (ref 7–16)
BUN BLDV-MCNC: 25 MG/DL (ref 6–23)
CALCIUM SERPL-MCNC: 8.7 MG/DL (ref 8.6–10.2)
CHLORIDE BLD-SCNC: 108 MMOL/L (ref 98–107)
CO2: 22 MMOL/L (ref 22–29)
CREAT SERPL-MCNC: 1.5 MG/DL (ref 0.5–1)
GFR AFRICAN AMERICAN: 42
GFR NON-AFRICAN AMERICAN: 42 ML/MIN/1.73
GLUCOSE BLD-MCNC: 87 MG/DL (ref 74–99)
POTASSIUM SERPL-SCNC: 4.2 MMOL/L (ref 3.5–5)
PRO-BNP: ABNORMAL PG/ML (ref 0–125)
SODIUM BLD-SCNC: 140 MMOL/L (ref 132–146)

## 2022-06-13 PROCEDURE — 99204 OFFICE O/P NEW MOD 45 MIN: CPT

## 2022-06-13 PROCEDURE — 36415 COLL VENOUS BLD VENIPUNCTURE: CPT

## 2022-06-13 PROCEDURE — 99214 OFFICE O/P EST MOD 30 MIN: CPT

## 2022-06-13 PROCEDURE — 80048 BASIC METABOLIC PNL TOTAL CA: CPT

## 2022-06-13 PROCEDURE — 96374 THER/PROPH/DIAG INJ IV PUSH: CPT

## 2022-06-13 PROCEDURE — 83880 ASSAY OF NATRIURETIC PEPTIDE: CPT

## 2022-06-13 NOTE — PROGRESS NOTES
Congestive Heart Failure [address] [facility]   1955          Referring Provider: Dr. Loraine Ballesteros  Primary Care Physician: Jeremiah FERGUSON  Cardiologist:  DR. Susan Montoya  Nephrologist:N/A        History of Present Illness:     David Blount is a 74 y.o. female with a history of HFrEF, most recent EF 30% ON 5/25/22. Patient Story:    She does  have dyspnea with exertion, shortness of breath, or decline in overall functional capacity. She does not have orthopnea, PND, nocturnal cough or hemoptysis. She does not have abdominal distention or bloating, early satiety, anorexia/change in appetite. She does has a good urinary response to  oral diuretic. She does not have  lower extremity edema. She denies lightheadedness, dizziness. She denies palpitations, syncope or near syncope. She does not complain of chest pain, pressure, discomfort. Allergies   Allergen Reactions    Pcn [Penicillins]            Prior to Visit Medications    Medication Sig Taking?  Authorizing Provider   spironolactone (ALDACTONE) 25 MG tablet Take 1 tablet by mouth daily  Kya Blankenship PA-C   metoprolol succinate (TOPROL XL) 100 MG extended release tablet Take 1 tablet by mouth in the morning and at bedtime  Briseida Lynne Pipes, APRN - NP   nicotine (NICODERM CQ) 14 MG/24HR Place 1 patch onto the skin daily  Patient not taking: Reported on 6/13/2022  AILEEN Smyth NP   sacubitril-valsartan (ENTRESTO) 24-26 MG per tablet Take 1 tablet by mouth 2 times daily  AILEEN Fabian NP   allopurinol (ZYLOPRIM) 100 MG tablet Take 100 mg by mouth 2 times daily  Historical Provider, MD   atorvastatin (LIPITOR) 40 MG tablet Take 40 mg by mouth at bedtime  Historical Provider, MD   loratadine (CLARITIN) 10 MG tablet Take 10 mg by mouth daily  Historical Provider, MD   aspirin 81 MG EC tablet Take 81 mg by mouth daily  Historical Provider, MD   albuterol sulfate  (90 Base) MCG/ACT inhaler Inhale 2 puffs into the lungs every 4 hours as needed for Wheezing  Vicci Cancer, PA-C   meloxicam (MOBIC) 7.5 MG tablet Take 1 tablet by mouth daily as needed for Pain  Calvin Dennis DO           Guideline directed medical:  ARNI/ACE I/ARB: Yes  Beta blocker:   Yes  Aldosterone antagonist:  Yes        Physical Examination:     BP (!) 174/75   Pulse 74   Resp 18   Wt 141 lb (64 kg)   SpO2 94%   BMI 23.46 kg/m²     Assessment  Charting Type: Shift assessment              HEENT (Head, Ears, Eyes, Nose, & Throat)  HEENT (WDL): Exceptions to WDL  Right Eye: Glasses  Left Eye: Glasses    Respiratory  Respiratory Pattern: Regular  Respiratory Depth: Normal  Respiratory Quality/Effort: Dyspnea with exertion  Chest Assessment: Chest expansion symmetrical  L Breath Sounds: Diminished,Rhonchi  R Breath Sounds: Diminished,Rhonchi              Cardiac  Cardiac Rhythm: Sinus rhythm    Rhythm Interpretation  Heart Rate: 74         Gastrointestinal  Abdominal (WDL): Within Defined Limits               Peripheral Vascular  Peripheral Vascular (WDL): Within Defined Limits  Edema: None                                                 Heart Rate: 74                     LAB DATA:    Last 3 BMP      Sodium (mmol/L)   Date Value   05/26/2022 139   05/25/2022 141   05/24/2022 138     Potassium (mmol/L)   Date Value   05/26/2022 3.7   05/25/2022 3.3 (L)   04/13/2022 4.6     Potassium reflex Magnesium (mmol/L)   Date Value   05/24/2022 4.1     Chloride (mmol/L)   Date Value   05/26/2022 103   05/25/2022 105   05/24/2022 107     CO2 (mmol/L)   Date Value   05/26/2022 27   05/25/2022 23   05/24/2022 23     BUN (mg/dL)   Date Value   05/26/2022 14   05/25/2022 16   05/24/2022 18     Glucose (mg/dL)   Date Value   05/26/2022 86   05/25/2022 79   05/24/2022 75     Calcium (mg/dL)   Date Value   05/26/2022 8.1 (L)   05/25/2022 8.3 (L)   05/24/2022 8.7       Last 3 BNP       Pro-BNP (pg/mL)   Date Value   05/24/2022 23,613 (H)          CBC: No results for input(s): WBC, HGB, PLT in the last 72 hours. BMP:  No results for input(s): NA, K, CL, CO2, BUN, CREATININE, GLUCOSE in the last 72 hours. Hepatic: No results for input(s): AST, ALT, ALB, BILITOT, ALKPHOS in the last 72 hours. Troponin: No results for input(s): TROPONINI in the last 72 hours. BNP: No results for input(s): BNP in the last 72 hours. Lipids: No results for input(s): CHOL, HDL in the last 72 hours. Invalid input(s): LDLCALCU  INR: No results for input(s): INR in the last 72 hours. WEIGHTS:    Wt Readings from Last 3 Encounters:   06/13/22 141 lb (64 kg)   06/01/22 143 lb (64.9 kg)   05/24/22 150 lb 8 oz (68.3 kg)         TELEMETRY:  Cardiac Regularity: REGULAR  Cardiac Rhythm/Interpretation:NSR        ASSESSMENT:  Jean-Claude Rod is evolemic with stable weights PER PT. Interventions completed this visit:  IV diuretics given no  Lab work obtained yes, BNP/BMP   Reviewed currently prescribed medications with patient, educated on importance of compliance and answered any questions regarding their medication  Educated on signs and symptoms of HF  Educated on low sodium diet    PLAN:  Scheduled to follow up in CHF clinic on   Future Appointments   Date Time Provider Chin Wesley   6/15/2022  1:00 PM SCHEDULE, [address]   6/22/2022  9:30 [facility]   6/28/2022  9:00 AM [facility] CHF ROOM 1 SEYZ CHF [facility]   7/1/2022 10:15 AM AVA [facility]   7/18/2022  8:30 AM [facility] CVL SPECIAL PROCEDURES LAB SEYZ CATH [facility]   5/3/2023 10:30 AM AVA [facility]     Given clinic phone number and aware of signs and symptoms to call with any HF change in symptoms. CHF TEACHING COMPLETED, CARING FOR YOUR HEART, ZONE PAMPHLET, DAILY WEIGHTS, LOW SODIUM DIET. PT AND DAUGHTER VERBALIZED UNDERSTANDING . PT DENIES ANY DISCOMFORT.

## 2022-06-13 NOTE — TELEPHONE ENCOUNTER
----- Message from Carmen Pedraza sent at 6/13/2022  1:23 PM EDT -----  Subject: Message to Provider    QUESTIONS  Information for Provider? Patient would like to start receiving home   health aid.   ---------------------------------------------------------------------------  --------------  CALL BACK INFO  What is the best way for the office to contact you? OK to leave message on   voicemail  Preferred Call Back Phone Number? [phone]  ---------------------------------------------------------------------------  --------------  SCRIPT ANSWERS  Relationship to Patient?  Self

## 2022-06-14 ENCOUNTER — TELEPHONE (OUTPATIENT)
Dept: PRIMARY CARE CLINIC | Age: 67
End: 2022-06-14

## 2022-06-14 DIAGNOSIS — I50.9 ACUTE DECOMPENSATED HEART FAILURE (HCC): Primary | ICD-10-CM

## 2022-06-14 NOTE — TELEPHONE ENCOUNTER
Pt calling she would like home health aide from [address] [place] [address], please request Isael Dahl as her aide, fax to   [phone] office [phone] w Martha Dejesus [phone]

## 2022-06-15 ENCOUNTER — HOSPITAL ENCOUNTER (OUTPATIENT)
Dept: AUDIOLOGY | Age: 67
Discharge: HOME | End: 2022-06-15
Payer: MEDICARE

## 2022-06-15 PROCEDURE — 92567 TYMPANOMETRY: CPT | Performed by: AUDIOLOGIST

## 2022-06-15 PROCEDURE — 92557 COMPREHENSIVE HEARING TEST: CPT | Performed by: AUDIOLOGIST

## 2022-06-15 NOTE — PROGRESS NOTES
AUDIOMETRIC EVALUATION    REASON FOR REFERRAL:  This patient was referred for audiometric testing by Mendel Ruddle PA-C due to hearing loss concerns. She was accompanied by her daughter. She noted to only have occasional issues, but her family feels she either doesn't hear them well or \"tunes them out\". She denies ear pain/drainage, tinnitus or dizziness. RESULTS:  Pure tone audiometric testing using earphones was carried out. Results revealed air conduction thresholds averaging 20/25 dBHL through 2000 Hz sloping to 70 dBHL in the left ear and 50 dBHL. iin the right ear at 8000 Hz Speech reception thresholds were obtained at 25 dBHL . Speech discrimination testing was performed at 60 dBHL. Obtained were scores of 92% for the left ear and 88% for the right ear. (Un)Masked Bone Conduction Testing revealed thresholds equal to air conduction thresholds. Tympanometry was administered and revealed tympanograms within normal limits. IMPRESSION:  Todays results revealed a borderline normal to mild hearing loss through the middle frequencies sloping to a moderately severe sensorineural hearing loss in the high frequencies. Speech reception thresholds were in agreement with the pure tone averages. Speech discrimination was excellent. Tympanometry was within normal limits. An asymmetry was noted for the right ear at 2000/3000/4000 and 8000 Hz. However no significant discrimination difference, no tinnitus and no dizziness was noted. Therefore an ENT consult was not recommended. It was recommended to monitor hearing and if any of these issues arise or a change in hearing is noted to follow up with doctor. Amplification was discussed but she does not meet Medicaid criteria to apply for hearing aids. She has normal to mild pure tone averages. She did not feel she needed amplification at this time, as well. The above results were reviewed with the patient and her daughter.        If I can be of further assistance or provide additional information, please do not hesitate to contact this office.       Thank you for the referral.        ___________________________________

## 2022-06-22 ENCOUNTER — OFFICE VISIT (OUTPATIENT)
Dept: CARDIOLOGY CLINIC | Age: 67
End: 2022-06-22
Payer: MEDICARE

## 2022-06-22 VITALS
WEIGHT: 138.4 LBS | DIASTOLIC BLOOD PRESSURE: 48 MMHG | HEIGHT: 65 IN | BODY MASS INDEX: 23.06 KG/M2 | HEART RATE: 75 BPM | OXYGEN SATURATION: 98 % | RESPIRATION RATE: 16 BRPM | SYSTOLIC BLOOD PRESSURE: 132 MMHG

## 2022-06-22 DIAGNOSIS — I50.9 CONGESTIVE HEART FAILURE, UNSPECIFIED HF CHRONICITY, UNSPECIFIED HEART FAILURE TYPE (HCC): Primary | ICD-10-CM

## 2022-06-22 PROCEDURE — 99214 OFFICE O/P EST MOD 30 MIN: CPT | Performed by: NURSE PRACTITIONER

## 2022-06-22 PROCEDURE — 93000 ELECTROCARDIOGRAM COMPLETE: CPT | Performed by: INTERNAL MEDICINE

## 2022-06-22 PROCEDURE — 1123F ACP DISCUSS/DSCN MKR DOCD: CPT | Performed by: NURSE PRACTITIONER

## 2022-06-22 RX ORDER — BUMETANIDE 1 MG/1
1 TABLET ORAL DAILY
Qty: 30 TABLET | Refills: 3 | Status: SHIPPED
Start: 2022-06-22 | End: 2022-10-18

## 2022-06-22 RX ORDER — METOPROLOL SUCCINATE 100 MG/1
100 TABLET, EXTENDED RELEASE ORAL 2 TIMES DAILY
Qty: 60 TABLET | Refills: 3 | Status: SHIPPED
Start: 2022-06-22 | End: 2022-10-18

## 2022-06-22 RX ORDER — SPIRONOLACTONE 25 MG/1
25 TABLET ORAL DAILY
Qty: 30 TABLET | Refills: 3 | Status: SHIPPED
Start: 2022-06-22 | End: 2022-10-18

## 2022-06-22 NOTE — PROGRESS NOTES
[facility]  Office Visit         Reason for Visit: Heart Failure    Primary Cardiologist: Dr. Vlad Sagastume        History of Present Illness:     Ms. Jayda Wells is a 74year old female with a PMHx of chronic HFrEF, severe AS, HTN, CKD, asthma and hx tobacco abuse. She presented to ER 5/24/2022 for a 1 month history of worsening lower extremity edema bilaterally and intermittent SOB. She was admitted for acute heart failure, IV diuresed and during hospitalization underwent TTE that demonstrated worsening LV function EF 30%, stage III DD, dilated RV with reduced function and mild TR, moderate MR, severe AAS, moderate-severe AI. GDMT was adjusted. She previously was following with valve clinic and scheduled for MICHAEL/LHC on 6/16/2022, she requested discharge from hospital for family event with plans to return for outpatient testing. Following discharge she called to cancel testing due to transportation and is now rescheduled for 7/18/2022. She presents today in follow-up, since discharge from the hospital she has been compliant with all her current cardiac medications. She has noted increased shortness of breath, early satiety and lower extremity edema. She reports dyspnea with exertion, shortness of breath, or decline in overall functional capacity. She denies orthopnea, PND, nocturnal cough or hemoptysis. She reports abdominal distention or bloating, early satiety, denies anorexia/change in appetite, unintentional weight loss. She does lower extremity edema. She denies exertional lightheadedness. She denies palpitations, syncope or near syncope. Review of systems is negative for chest pain, pressure, discomfort. When ambulating on an incline, She does not leg claudication. History is negative for neurological symptoms including transient loss of vision, asymmetric weakness, aphasia, dysphasia, numbness, tingling.        Patient Active Problem List    Diagnosis Date Noted    Acute decompensated heart failure ([facility] [address] 05/24/2022     Priority: Medium    Acute combined systolic and diastolic congestive heart failure (HCC)      Priority: Medium    Mild intermittent asthma without complication 87/54/8706    Pure hypercholesterolemia 07/08/2019    Tobacco abuse 07/08/2019    Hypertension      Past Medical History:    1. HTN, currently uncontrolled  2. CKD--baseline 1.2-1.3--today 1.4  3. Asthma  4. HFpEF with VHD:  ? TTE 8/11/2021: Moderate concentric LVH. Ejection fraction is visually estimated at 50 to 55%. Normal right ventricular size and function. Mild thickening of the mitral valve leaflets. Mild mitral regurgitation is present. Severe calcification of the aortic valve . Moderate aortic regurgitation is noted. There severe aortic stenosis . The aortic valve area is 0.6cm2 and Dimensionless index of 0.18 to 0.23 consistent with severe to critical aortic stenosis  ? TTE 6/26/2018: Left ventricular size is grossly normal. Moderate left ventricular concentric hypertrophy noted. Ejection fraction is visually estimated at 50%.  Severe aortic stenosis is present. The aortic valve area is 0.76 cm2 with a maximum gradient of 55 mmHg and a mean gradient of 29 mmHg. Mild aortic regurgitation is noted.       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    TONSILLECTOMY      TUBAL LIGATION         Allergies   Allergen Reactions    Pcn [Penicillins]          Outpatient Medications Marked as Taking for the 6/22/22 encounter (Office Visit) with Rosa Forrestgm, APRN - CNP   Medication Sig Dispense Refill    spironolactone (ALDACTONE) 25 MG tablet Take 1 tablet by mouth daily 30 tablet 0    metoprolol succinate (TOPROL XL) 100 MG extended release tablet Take 1 tablet by mouth in the morning and at bedtime 30 tablet 0    sacubitril-valsartan (ENTRESTO) 24-26 MG per tablet Take 1 tablet by mouth 2 times daily 60 tablet 0    allopurinol (ZYLOPRIM) 100 MG tablet Take 100 mg by mouth 2 times daily      atorvastatin (LIPITOR) 40 MG tablet Take 40 mg by mouth at bedtime      loratadine (CLARITIN) 10 MG tablet Take 10 mg by mouth daily      aspirin 81 MG EC tablet Take 81 mg by mouth daily      albuterol sulfate  (90 Base) MCG/ACT inhaler Inhale 2 puffs into the lungs every 4 hours as needed for Wheezing 18 g 5           Guideline directed medical/device therapy:  ARNI/ACE I/ARB: Yes  Beta blocker: Yes  Aldosterone antagonist:  Yes  ICD/CRT-P/-D:  None   QRS interval on recent ECG (personally reviewed/interpreted): <120 ms  Percentage RV pacing (personally reviewed/interpreted): %/NA      Review of Systems:   Cardiac: As per HPI  General: No fever, chills, rigors  Pulmonary: As per HPI  HEENT: No visual disturbances, difficult swallowing  GI: No nausea, vomiting, abdominal pain  : No dysuria or hematuria  Endocrine: No thyroid disease or diabetes  Musculoskeletal: KIM x 4, no focal motor deficits  Skin: Intact, no rashes  Neuro/Psych: No headache or seizures        Weights: Wt Readings from Last 3 Encounters:   06/22/22 138 lb 6.4 oz (62.8 kg)   06/13/22 141 lb (64 kg)   06/01/22 143 lb (64.9 kg)         Physical Examination:     BP (!) 132/48 (Site: Right Upper Arm, Position: Sitting, Cuff Size: Medium Adult)   Pulse 75   Resp 16   Ht 5' 5\" (1.651 m)   Wt 138 lb 6.4 oz (62.8 kg)   SpO2 98%   BMI 23.03 kg/m²     CONSTITUTIONAL: Alert and oriented times 3, no acute distress and cooperative to examination with proper mood and affect. SKIN: Skin color, texture, turgor normal. No rashes or lesions. LYMPH: no cervical nodes, no inguinal nodes  HEENT: Head is normocephalic, atraumatic. EOMI, PERRLA.   NECK: Supple, symmetrical, trachea midline, no adenopathy, thyroid symmetric, not enlarged and no tenderness, skin normal.  CHEST/LUNGS: chest symmetric with normal A/P diameter, normal respiratory rate and rhythm, lungs coarse throughout. No accessory muscle use. Scars None   CARDIOVASCULAR: Heart sounds are normal.  Regular rate and rhythm with III/VI systolic murmur, no gallop or rub. Normal S1 and S2. Carotid and femoral pulses 2+/4 and equal bilaterally. ABDOMEN: Normal shape. No and Laparoscopic scar(s) present. Normal bowel sounds. No bruits. soft, nondistended, no masses or organomegaly. no evidence of hernia. Percussion: Normal without hepatosplenomegally. Tenderness: absent. RECTAL: deferred, not clinically indicated  NEUROLOGIC: There are no focalizing motor or sensory deficits. CN II-XII are grossly intact. Lorean Snare EXTREMITIES: no cyanosis, no clubbing. 1+ bilateral lower extremity edema. Warm and well perfused. All the following diagnostics were personally reviewed and interpreted by me. LAB DATA:     5/25/2022 04:44 5/26/2022 05:05 6/13/2022 13:00   Sodium 141 139 140   Potassium 3.3 (L) 3.7 4.2   Chloride 105 103 108 (H)   CO2 23 27 22   BUN,BUNPL 16 14 25 (H)   Creatinine 1.5 (H) 1.4 (H) 1.5 (H)   Anion Gap 13 9 10   GFR Non- 42 46 42   GFR African American 42 46 42   Magnesium 1.5 (L) 1.8    GLUCOSE, FASTING,GF 79 86 87   CALCIUM, SERUM, 425723 8.3 (L) 8.1 (L) 8.7   Total Protein  5.3 (L)    Pro-BNP   28,263 (H)       IMAGING:    CXR (5/24/2022)  FINDINGS:  Left heart appears prominent, slight pulmonary venous hypertension.  No  evidence of pulmonary edema.  Right convexity thoracic scoliosis unchanged. Lungs are clear.  No pleural fluid or focal pneumonia. Impression  1.  Prominent left heart and pulmonary venous hypertension.  Findings suggest  minimal CHF/volume overload. 2.  No evidence of alveolar consolidation.       CARDIAC TESTING:    TTE (6/26/2018)   Summary   Left ventricular size is grossly normal.   Moderate left ventricular concentric hypertrophy noted. Ejection fraction is visually estimated at 50%. Severe aortic stenosis is present. The aortic valve area is 0.76 cm2 with a maximum gradient of 55 mmHg and a   mean gradient of 29 mmHg. Mild aortic regurgitation is noted. TTE (8/11/2021)   Summary   Moderate concentric LVH   Ejection fraction is visually estimated at 50 to 55%. Normal right ventricular size and function. Mild thickening of the mitral valve leaflets. Mild mitral regurgitation is present. Severe calcification of the aortic valve . Moderate aortic regurgitation is noted. There severe aortic stenosis . The aortic valve area is 0.6cm2 and   Dimensionless index of 0.18 to 0.23 consistent with severe to critical   aortic stenosis    TTE (5/24/2022)   Summary   Ejection fraction is visually estimated at 30%. Overall ejection fraction severely decreased. Severe left ventricular concentric hypertrophy noted. There is doppler evidence of stage III diastolic dysfunction. Dilated right ventricle with reduced function. Left atrial volume index of 35 ml per meters squared BSA. Severe aortic stenosis is present. The aortic valve area is 0.7 cm2 with a maximum gradient of 77 mmHg and a   mean gradient of 40 mmHg. Moderate-to-severe aortic regurgitation is noted. Aortic valve pressure   half-time 271 ms. Moderate mitral regurgitation is present. Mild tricuspid regurgitation. RVSP is 37 mmHg. No evidence for hemodynamically significant pericardial effusion. EKG  NSR  Left ventricular hypertrophy with strain patter  Poor R wave progression - non-specific      ASSESSMENT:  1. Acute on chronic HFrEF  2. ACC stage C / NYHA class III  3. Hypervolemic  4. Cardiomyopathy, ischemia evaluation pending  5. LVEF 30%, LVEDD 5.0, LVMI 233  6. Severe AS, moderate-severe AI, moderate MR - Following with valve clinic, MICHAEL evaluation pending  7. HTN  8. CKD  9. Asthma   10.  Ongoing tobacco abuse       PLAN:  1. Start Bumex 1 mg daily     2. Continue rest of current cardiac medications    3. Follow up with CHF clinic as scheduled net week, once we review visit we will continue to adjust GDMT     4. Follow up with Dr. Makenzie Kuhn following [address]    5. Weigh yourself daily    -Stay Hydrated, 64 oz fluid daily     -Diet should sodium restricted to 2000mg sodium daily     -Again watch your daily weight trends and if you gain water weight please follow below instructions.    -If you gain 3-5 pounds in 2-3 days OR notice that you are retaining fluid in anyway just like you did before then take an extra dose of your water pill (furosemide/Lasix OR bumetanide/Bumex OR Demadex/Torsemide) every day until you lose the weight or feel better.     -If you notice that you have taken more than 2 extra doses in 1 week then please call and let us know. -If at any time you feel that you are retaining fluid, your medications are not working, or you feel ill in anyway, then please call us for either same day appointment or the next day, and for instructions. Our goal is to keep you out of the emergency room and the hospital and we have ways to do it. You just need to call us in a timely manner.     -If you become sick for other reasons, and notice that you are not urinating as much, the urine is very dark, you have significant diarrhea or vomiting, then please DO NOT take your water pill and CALL US immediately.     Torri GALLAGHER-CNP  [facility]

## 2022-06-22 NOTE — PATIENT INSTRUCTIONS
1. Start Bumex 1 mg daily     2. Continue rest of current cardiac medications    3. Follow up with CHF clinic as scheduled net week     4. Follow up with Dr. Makenzie Kuhn following heart testing     5. Weigh yourself daily    -Stay Hydrated, 64 oz fluid daily     -Diet should sodium restricted to 2000 - 2500 mg sodium daily     -Again watch your daily weight trends and if you gain water weight please follow below instructions.    -If you gain 3-5 pounds in 2-3 days OR notice that you are retaining fluid in anyway just like you did before then take an extra dose of your water pill ( Bumetanide/Bumex) every day until you lose the weight or feel better.     -If you notice that you have taken more than 2 extra doses in 1 week then please call and let us know. -If at any time you feel that you are retaining fluid, your medications are not working, or you feel ill in anyway, then please call us for either same day appointment or the next day, and for instructions. Our goal is to keep you out of the emergency room and the hospital and we have ways to do it. You just need to call us in a timely manner.     -If you become sick for other reasons, and notice that you are not urinating as much, the urine is very dark, you have significant diarrhea or vomiting, then please DO NOT take your water pill and CALL US immediately.

## 2022-06-28 ENCOUNTER — HOSPITAL ENCOUNTER (OUTPATIENT)
Dept: OTHER | Age: 67
Setting detail: THERAPIES SERIES
Discharge: HOME | End: 2022-06-28
Payer: MEDICARE

## 2022-06-28 NOTE — PROGRESS NOTES
Pt no show for CHF Clinic appt. Called pt. To reschedule and she confirmed she forgot appt, despite reminder call. Appt. re scheduled.

## 2022-07-01 ENCOUNTER — OFFICE VISIT (OUTPATIENT)
Dept: PRIMARY CARE CLINIC | Age: 67
End: 2022-07-01
Payer: MEDICARE

## 2022-07-01 ENCOUNTER — HOSPITAL ENCOUNTER (OUTPATIENT)
Age: 67
Discharge: HOME | End: 2022-07-01
Payer: MEDICARE

## 2022-07-01 VITALS
TEMPERATURE: 97.1 F | HEIGHT: 65 IN | WEIGHT: 122 LBS | DIASTOLIC BLOOD PRESSURE: 62 MMHG | RESPIRATION RATE: 16 BRPM | BODY MASS INDEX: 20.33 KG/M2 | SYSTOLIC BLOOD PRESSURE: 120 MMHG

## 2022-07-01 DIAGNOSIS — N18.30 STAGE 3 CHRONIC KIDNEY DISEASE, UNSPECIFIED WHETHER STAGE 3A OR 3B CKD (HCC): ICD-10-CM

## 2022-07-01 DIAGNOSIS — I50.41 ACUTE COMBINED SYSTOLIC AND DIASTOLIC CONGESTIVE HEART FAILURE (HCC): Primary | ICD-10-CM

## 2022-07-01 DIAGNOSIS — I50.41 ACUTE COMBINED SYSTOLIC AND DIASTOLIC CONGESTIVE HEART FAILURE (HCC): ICD-10-CM

## 2022-07-01 DIAGNOSIS — I10 PRIMARY HYPERTENSION: ICD-10-CM

## 2022-07-01 DIAGNOSIS — I35.0 SEVERE AORTIC STENOSIS: ICD-10-CM

## 2022-07-01 DIAGNOSIS — L30.9 DERMATITIS: ICD-10-CM

## 2022-07-01 LAB
ANION GAP SERPL CALCULATED.3IONS-SCNC: 21 MMOL/L (ref 7–16)
BUN BLDV-MCNC: 31 MG/DL (ref 6–23)
CALCIUM SERPL-MCNC: 9.2 MG/DL (ref 8.6–10.2)
CHLORIDE BLD-SCNC: 101 MMOL/L (ref 98–107)
CO2: 18 MMOL/L (ref 22–29)
CREAT SERPL-MCNC: 2.1 MG/DL (ref 0.5–1)
GFR AFRICAN AMERICAN: 29
GFR NON-AFRICAN AMERICAN: 29 ML/MIN/1.73
GLUCOSE BLD-MCNC: 142 MG/DL (ref 74–99)
POTASSIUM SERPL-SCNC: 3.6 MMOL/L (ref 3.5–5)
PRO-BNP: 2883 PG/ML (ref 0–125)
SODIUM BLD-SCNC: 140 MMOL/L (ref 132–146)

## 2022-07-01 PROCEDURE — 36415 COLL VENOUS BLD VENIPUNCTURE: CPT

## 2022-07-01 PROCEDURE — 83880 ASSAY OF NATRIURETIC PEPTIDE: CPT

## 2022-07-01 PROCEDURE — 1123F ACP DISCUSS/DSCN MKR DOCD: CPT | Performed by: PHYSICIAN ASSISTANT

## 2022-07-01 PROCEDURE — 99214 OFFICE O/P EST MOD 30 MIN: CPT | Performed by: PHYSICIAN ASSISTANT

## 2022-07-01 PROCEDURE — 80048 BASIC METABOLIC PNL TOTAL CA: CPT

## 2022-07-01 NOTE — PROGRESS NOTES
Chief Complaint   Patient presents with    1 Month Follow-Up       HPI:  Patient is here for follow-up of CHF. Unfortunately, she missed her chf clinic apt. There were no labs done. She would like me to do them. Her dizziness and sob is stable. No cp. No le edema. Rash:  Patient is here today with complaints of a rash. This has been going on for 1 week(s). Rash is located on leg. It is not spreading. Exacerbating factors include none. Alleviating factors include none. Associated signs and symptoms include none. Patient has not changed hygiene products. Patient does not have pets. This has not happened to patient in the past.  Patient has not had recent travel. Patient's past medical, surgical, social and/or family history reviewed, updated in chart, and are non-contributory (unless otherwise stated). Medications and allergies also reviewed and updated in chart. Review of Systems:  Constitutional:  No fever, no fatigue, no chills, no headaches, no weight change  Dermatology:  + rash, no mole, no dry or sensitive skin  ENT:  No cough, no sore throat, no sinus pain, no runny nose, no ear pain  Cardiology:  No chest pain, no palpitations, no leg edema, no shortness of breath, no PND  Gastroenterology:  No dysphagia, no abdominal pain, no nausea, no vomiting, no constipation, no diarrhea, no heartburn  Musculoskeletal:  No joint pain, no leg cramps, no back pain, no muscle aches  Respiratory:  No shortness of breath, no orthopnea, no wheezing, no ALMEIDA, no hemoptysis  Urology:  No blood in the urine, no urinary frequency, no urinary incontinence, no urinary urgency, no nocturia, no dysuria        Vitals:    07/01/22 1009 07/01/22 1015 07/01/22 1033   BP: (!) 114/56 (!) 115/54 120/62   Resp: 16     Temp: 97.1 °F (36.2 °C)     Weight: 122 lb (55.3 kg)     Height: 5' 5\" (1.651 m)         Physical Exam  Constitutional:       General: She is not in acute distress.      Appearance: She is well-developed. HENT:      Head: Normocephalic and atraumatic. Right Ear: External ear normal.      Left Ear: External ear normal.      Nose: Nose normal.   Eyes:      General: No scleral icterus. Conjunctiva/sclera: Conjunctivae normal.      Pupils: Pupils are equal, round, and reactive to light. Neck:      Thyroid: No thyromegaly. Cardiovascular:      Rate and Rhythm: Normal rate and regular rhythm. Heart sounds: Murmur (systolic) heard. Pulmonary:      Effort: Pulmonary effort is normal. No accessory muscle usage or respiratory distress. Breath sounds: Normal breath sounds. No wheezing. Musculoskeletal:         General: Normal range of motion. Cervical back: Normal range of motion and neck supple. Right lower leg: No edema. Left lower leg: No edema. Skin:     General: Skin is warm and dry. Findings: Rash (legs) present. Neurological:      Mental Status: She is alert and oriented to person, place, and time. Deep Tendon Reflexes: Reflexes are normal and symmetric. Psychiatric:         Speech: Speech normal.         Behavior: Behavior normal.         Assessment/Plan:      Estephania Gambino was seen today for 1 month follow-up. Diagnoses and all orders for this visit:    Acute combined systolic and diastolic congestive heart failure (HCC)  -     Brain Natriuretic Peptide; Future    Stage 3 chronic kidney disease, unspecified whether stage 3a or 3b CKD ([facility] [address]  -     Basic Metabolic Panel; Future    Primary hypertension  -stable on current meds    Severe aortic stenosis  Cath scheduled this month    Dermatitis  -     mineral oil-hydrophilic petrolatum (AQUAPHOR) ointment; Apply topically as needed. As above. Call or go to ED immediately if symptoms worsen or persist.  Return in about 4 weeks (around 7/29/2022). , or sooner if necessary.       Educational materials and/or home exercises printed for patient's review and were included in patient instructions on his/her After Visit Summary and given to patient at the end of visit. Counseled regarding above diagnosis, including possible risks and complications,  especially if left uncontrolled. Counseled regarding the possible side effects, risks, benefits and alternatives to treatment; patient and/or guardian verbalizes understanding, agrees, feels comfortable with and wishes to proceed with above treatment plan. Advised patient to call with any new medication issues, and read all Rx info from pharmacy to assure aware of all possible risks and side effects of medication before taking. Reviewed age and gender appropriate health screening exams and vaccinations. Advised patient regarding importance of keeping up with recommended health maintenance and to schedule as soon as possible if overdue, as this is important in assessing for undiagnosed pathology, especially cancer, as well as protecting against potentially harmful/life threatening disease. Patient and/or guardian verbalizes understanding and agrees with above counseling, assessment and plan. All questions answered. Anabel Villalobos PA-C  7/1/2022    I have personally reviewed and updated the chief complaint, HPI, Past Medical, Family and Social History, as well as the above Review of Systems.

## 2022-07-06 ENCOUNTER — TELEPHONE (OUTPATIENT)
Dept: PRIMARY CARE CLINIC | Age: 67
End: 2022-07-06

## 2022-07-06 DIAGNOSIS — R79.89 ELEVATED SERUM CREATININE: Primary | ICD-10-CM

## 2022-07-06 NOTE — TELEPHONE ENCOUNTER
----- Message from Gilberto Vizcarra sent at 7/6/2022 11:16 AM EDT -----  Subject: Message to Provider    QUESTIONS  Information for Provider? pt. is waiting to hear about more lab work that   Mayuri Marins wanted her to do for a f/u, due to the water pill she is   taking.  Please call pt. and advise.   ---------------------------------------------------------------------------  --------------  Hussein Jarquin [facility]  [phone]; OK to leave message on voicemail  ---------------------------------------------------------------------------  --------------  SCRIPT ANSWERS  undefined

## 2022-07-12 ENCOUNTER — HOSPITAL ENCOUNTER (OUTPATIENT)
Dept: OTHER | Age: 67
Setting detail: THERAPIES SERIES
Discharge: HOME | End: 2022-07-12
Payer: MEDICARE

## 2022-07-12 VITALS
HEART RATE: 76 BPM | WEIGHT: 125 LBS | DIASTOLIC BLOOD PRESSURE: 76 MMHG | OXYGEN SATURATION: 100 % | BODY MASS INDEX: 20.8 KG/M2 | RESPIRATION RATE: 18 BRPM | SYSTOLIC BLOOD PRESSURE: 160 MMHG

## 2022-07-12 LAB
ANION GAP SERPL CALCULATED.3IONS-SCNC: 16 MMOL/L (ref 7–16)
BUN BLDV-MCNC: 21 MG/DL (ref 6–23)
CALCIUM SERPL-MCNC: 9.7 MG/DL (ref 8.6–10.2)
CHLORIDE BLD-SCNC: 103 MMOL/L (ref 98–107)
CO2: 23 MMOL/L (ref 22–29)
CREAT SERPL-MCNC: 1.3 MG/DL (ref 0.5–1)
GFR AFRICAN AMERICAN: 50
GFR NON-AFRICAN AMERICAN: 50 ML/MIN/1.73
GLUCOSE BLD-MCNC: 74 MG/DL (ref 74–99)
POTASSIUM SERPL-SCNC: 4.6 MMOL/L (ref 3.5–5)
PRO-BNP: 3834 PG/ML (ref 0–125)
SODIUM BLD-SCNC: 142 MMOL/L (ref 132–146)

## 2022-07-12 PROCEDURE — 80048 BASIC METABOLIC PNL TOTAL CA: CPT

## 2022-07-12 PROCEDURE — 36415 COLL VENOUS BLD VENIPUNCTURE: CPT

## 2022-07-12 PROCEDURE — 83880 ASSAY OF NATRIURETIC PEPTIDE: CPT

## 2022-07-12 PROCEDURE — 99214 OFFICE O/P EST MOD 30 MIN: CPT

## 2022-07-12 NOTE — RESULT ENCOUNTER NOTE
Labs and CHF clinic note reviewed  /76    Current GDMT:  Entresto 24/26 mg BID  Toprol 100 mg BID  Aldactone 25 mg daily   Bumex 1 mg daily     Please have her increase Entresto to moderate dose   Follow up labs in 1 week     Thank you

## 2022-07-12 NOTE — PROGRESS NOTES
Congestive Heart Failure [facility] [place]   1955          Referring Provider: Dr. Ryley Goodwin  Primary Care Physician: Paty FERGUSON  Cardiologist:  DR. Radha Constantino  Nephrologist:N/A        History of Present Illness:     Robert Suggs is a 74 y.o. female with a history of HFrEF, most recent EF 30% ON 5/25/22. Patient Story:    She does not  have dyspnea with exertion, shortness of breath, or decline in overall functional capacity. She does not have orthopnea, PND, nocturnal cough or hemoptysis. She does not have abdominal distention or bloating, early satiety, anorexia/change in appetite. She does has a good urinary response to  oral diuretic. She does not have  lower extremity edema. She denies lightheadedness, dizziness. She denies palpitations, syncope or near syncope. She does not complain of chest pain, pressure, discomfort. Allergies   Allergen Reactions    Pcn [Penicillins]            Prior to Visit Medications    Medication Sig Taking? Authorizing Provider   mineral oil-hydrophilic petrolatum (AQUAPHOR) ointment Apply topically as needed.   Keli Ring PA-C   bumetanide (BUMEX) 1 MG tablet Take 1 tablet by mouth daily  AILEEN Mcgraw CNP   spironolactone (ALDACTONE) 25 MG tablet Take 1 tablet by mouth daily  AILEEN Mcgraw CNP   sacubitril-valsartan (ENTRESTO) 24-26 MG per tablet Take 1 tablet by mouth 2 times daily  AILEEN Mcgraw CNP   metoprolol succinate (TOPROL XL) 100 MG extended release tablet Take 1 tablet by mouth in the morning and at bedtime  AILEEN Mcgraw CNP   nicotine (NICODERM CQ) 14 MG/24HR Place 1 patch onto the skin daily  AILEEN Hall NP   allopurinol (ZYLOPRIM) 100 MG tablet Take 100 mg by mouth 2 times daily  Historical Provider, MD   atorvastatin (LIPITOR) 40 MG tablet Take 40 mg by mouth at bedtime  Historical Provider, MD   loratadine (CLARITIN) 10 MG tablet Take 10 mg by mouth daily  Historical Provider, MD   aspirin 81 MG EC tablet Take 81 mg by mouth daily  Historical Provider, MD   albuterol sulfate  (90 Base) MCG/ACT inhaler Inhale 2 puffs into the lungs every 4 hours as needed for Wheezing  Roopa Hilliard PA-C   meloxicam (MOBIC) 7.5 MG tablet Take 1 tablet by mouth daily as needed for Pain  Illona Ramirez, DO           Guideline directed medical:  ARNI/ACE I/ARB: Yes  Beta blocker:   Yes  Aldosterone antagonist:  Yes        Physical Examination:     BP (!) 160/76   Resp 18   Wt 125 lb (56.7 kg)   SpO2 100%   BMI 20.80 kg/m²     Assessment  Charting Type: Shift assessment              HEENT (Head, Ears, Eyes, Nose, & Throat)  HEENT (WDL): Exceptions to WDL  Right Eye: Glasses  Left Eye: Glasses    Respiratory  Respiratory Pattern: Regular  Respiratory Depth: Normal  Respiratory Quality/Effort: Dyspnea with exertion  Chest Assessment: Chest expansion symmetrical  L Breath Sounds: Diminished  R Breath Sounds: Diminished              Cardiac  Cardiac Rhythm: Sinus rhythm              Gastrointestinal  Abdominal (WDL): Within Defined Limits               Peripheral Vascular  Peripheral Vascular (WDL): Within Defined Limits  Edema: None                                                                       LAB DATA:    Last 3 BMP      Sodium (mmol/L)   Date Value   07/01/2022 140   06/13/2022 140   05/26/2022 139     Potassium (mmol/L)   Date Value   07/01/2022 3.6   06/13/2022 4.2   05/26/2022 3.7     Potassium reflex Magnesium (mmol/L)   Date Value   05/24/2022 4.1     Chloride (mmol/L)   Date Value   07/01/2022 101   06/13/2022 108 (H)   05/26/2022 103     CO2 (mmol/L)   Date Value   07/01/2022 18 (L)   06/13/2022 22   05/26/2022 27     BUN (mg/dL)   Date Value   07/01/2022 31 (H)   06/13/2022 25 (H)   05/26/2022 14     Glucose (mg/dL)   Date Value   07/01/2022 142 (H)   06/13/2022 87   05/26/2022 86     Calcium (mg/dL)   Date Value   07/01/2022 9.2   06/13/2022 8.7 05/26/2022 8.1 (L)       Last 3 BNP       Pro-BNP (pg/mL)   Date Value   07/01/2022 2,883 (H)   06/13/2022 28,263 (H)   05/24/2022 23,613 (H)          CBC: No results for input(s): WBC, HGB, PLT in the last 72 hours. BMP:  No results for input(s): NA, K, CL, CO2, BUN, CREATININE, GLUCOSE in the last 72 hours. Hepatic: No results for input(s): AST, ALT, ALB, BILITOT, ALKPHOS in the last 72 hours. Troponin: No results for input(s): TROPONINI in the last 72 hours. BNP: No results for input(s): BNP in the last 72 hours. Lipids: No results for input(s): CHOL, HDL in the last 72 hours. Invalid input(s): LDLCALCU  INR: No results for input(s): INR in the last 72 hours. WEIGHTS:    Wt Readings from Last 3 Encounters:   07/12/22 125 lb (56.7 kg)   07/01/22 122 lb (55.3 kg)   06/22/22 138 lb 6.4 oz (62.8 kg)         TELEMETRY:  Cardiac Regularity: REGULAR  Cardiac Rhythm/Interpretation:NSR        ASSESSMENT:  Ayden Lawrence is evolemic with stable weights patient weight is down 16lb from her last visit. Interventions completed this visit:  IV diuretics given no  Lab work obtained yes, BNP/BMP   Reviewed currently prescribed medications with patient, educated on importance of compliance and answered any questions regarding their medication  Educated on signs and symptoms of HF  Educated on low sodium diet    PLAN:  Scheduled to follow up in CHF clinic on   Future Appointments   Date Time Provider Chin Wesley   7/18/2022  8:30 AM [facility] CVL SPECIAL PROCEDURES LAB [facility] C[facility] [place]   8/12/2022 12:15 PM [facility] CHF ROOM 1 YZ CHF [place]   5/3/2023 10:30 AM Bessy Alves PA-C TRAIL [facility]     Given clinic phone number and aware of signs and symptoms to call with any HF change in symptoms.

## 2022-07-13 ENCOUNTER — TELEPHONE (OUTPATIENT)
Dept: CARDIOLOGY CLINIC | Age: 67
End: 2022-07-13

## 2022-07-13 DIAGNOSIS — I50.9 CONGESTIVE HEART FAILURE, UNSPECIFIED HF CHRONICITY, UNSPECIFIED HEART FAILURE TYPE (HCC): Primary | ICD-10-CM

## 2022-07-13 DIAGNOSIS — Z79.899 MEDICATION DOSE INCREASED: ICD-10-CM

## 2022-07-13 NOTE — TELEPHONE ENCOUNTER
----- Message from AILEEN Padgett - CNP sent at 7/12/2022  3:01 PM EDT -----  Labs and CHF clinic note reviewed  /76    Current GDMT:  Entresto 24/26 mg BID  Toprol 100 mg BID  Aldactone 25 mg daily   Bumex 1 mg daily     Please have her increase Entresto to moderate dose   Follow up labs in 1 week     Thank you

## 2022-07-13 NOTE — TELEPHONE ENCOUNTER
6-22-22 was last ship date of pill pack. Has 24-26mg entresto BID included in it. Our office shipping 24-26mg tabs and patient to take an additional entresto tab twice daily to make 49-51mg BID dosing. Next pill packing goes out in a week  Sriram Arellano has 9 days of pill pack left. In 7 days will be shipped with Entresto 49-51mg BID dosing - per pharmacist their pill packing looks like a bingo card. and  anything PRN will go in bottles. ) Free delivery. Will utilize sampling of entresto for titration to coordinate with pill packing deliveries. Pill packing goes out around the 22nd of each month.

## 2022-07-15 ENCOUNTER — TELEPHONE (OUTPATIENT)
Dept: NON INVASIVE DIAGNOSTICS | Age: 67
End: 2022-07-15

## 2022-07-17 ENCOUNTER — ANESTHESIA EVENT (OUTPATIENT)
Dept: CARDIAC CATH/INVASIVE PROCEDURES | Age: 67
End: 2022-07-17

## 2022-07-18 ENCOUNTER — ANESTHESIA (OUTPATIENT)
Dept: CARDIAC CATH/INVASIVE PROCEDURES | Age: 67
End: 2022-07-18

## 2022-07-18 ENCOUNTER — HOSPITAL ENCOUNTER (OUTPATIENT)
Dept: CARDIAC CATH/INVASIVE PROCEDURES | Age: 67
Discharge: HOME | End: 2022-07-18
Attending: INTERNAL MEDICINE | Admitting: INTERNAL MEDICINE
Payer: MEDICARE

## 2022-07-18 VITALS
BODY MASS INDEX: 21.33 KG/M2 | HEART RATE: 71 BPM | DIASTOLIC BLOOD PRESSURE: 51 MMHG | OXYGEN SATURATION: 99 % | TEMPERATURE: 97.7 F | WEIGHT: 128 LBS | HEIGHT: 65 IN | RESPIRATION RATE: 18 BRPM | SYSTOLIC BLOOD PRESSURE: 121 MMHG

## 2022-07-18 PROBLEM — I35.0 AORTIC STENOSIS, SEVERE: Status: ACTIVE | Noted: 2022-07-18

## 2022-07-18 LAB
ABO/RH: NORMAL
ANTIBODY SCREEN: NORMAL
LV EF: 38 %
LVEF MODALITY: NORMAL
REASON FOR REJECTION: NORMAL
REJECTED TEST: NORMAL

## 2022-07-18 PROCEDURE — 86900 BLOOD TYPING SEROLOGIC ABO: CPT

## 2022-07-18 PROCEDURE — 93325 DOPPLER ECHO COLOR FLOW MAPG: CPT

## 2022-07-18 PROCEDURE — 93325 DOPPLER ECHO COLOR FLOW MAPG: CPT | Performed by: INTERNAL MEDICINE

## 2022-07-18 PROCEDURE — 86850 RBC ANTIBODY SCREEN: CPT

## 2022-07-18 PROCEDURE — 93454 CORONARY ARTERY ANGIO S&I: CPT | Performed by: INTERNAL MEDICINE

## 2022-07-18 PROCEDURE — 93321 DOPPLER ECHO F-UP/LMTD STD: CPT

## 2022-07-18 PROCEDURE — 3700000001 HC ADD 15 MINUTES (ANESTHESIA)

## 2022-07-18 PROCEDURE — 2709999900 HC NON-CHARGEABLE SUPPLY

## 2022-07-18 PROCEDURE — 36415 COLL VENOUS BLD VENIPUNCTURE: CPT

## 2022-07-18 PROCEDURE — 93320 DOPPLER ECHO COMPLETE: CPT | Performed by: INTERNAL MEDICINE

## 2022-07-18 PROCEDURE — 93454 CORONARY ARTERY ANGIO S&I: CPT

## 2022-07-18 PROCEDURE — 93312 ECHO TRANSESOPHAGEAL: CPT | Performed by: INTERNAL MEDICINE

## 2022-07-18 PROCEDURE — 3700000000 HC ANESTHESIA ATTENDED CARE

## 2022-07-18 PROCEDURE — 93312 ECHO TRANSESOPHAGEAL: CPT

## 2022-07-18 PROCEDURE — C1894 INTRO/SHEATH, NON-LASER: HCPCS

## 2022-07-18 PROCEDURE — C1769 GUIDE WIRE: HCPCS

## 2022-07-18 PROCEDURE — 2500000003 HC RX 250 WO HCPCS

## 2022-07-18 PROCEDURE — 86901 BLOOD TYPING SEROLOGIC RH(D): CPT

## 2022-07-18 PROCEDURE — 6360000002 HC RX W HCPCS

## 2022-07-18 PROCEDURE — 6370000000 HC RX 637 (ALT 250 FOR IP): Performed by: INTERNAL MEDICINE

## 2022-07-18 RX ORDER — SODIUM CHLORIDE 9 MG/ML
INJECTION, SOLUTION INTRAVENOUS CONTINUOUS PRN
Status: DISCONTINUED | OUTPATIENT
Start: 2022-07-18 | End: 2022-07-18 | Stop reason: SDUPTHER

## 2022-07-18 RX ORDER — ASPIRIN 81 MG/1
242 TABLET, CHEWABLE ORAL ONCE
Status: COMPLETED | OUTPATIENT
Start: 2022-07-18 | End: 2022-07-18

## 2022-07-18 RX ORDER — MIDAZOLAM HYDROCHLORIDE 1 MG/ML
INJECTION INTRAMUSCULAR; INTRAVENOUS PRN
Status: DISCONTINUED | OUTPATIENT
Start: 2022-07-18 | End: 2022-07-18 | Stop reason: SDUPTHER

## 2022-07-18 RX ORDER — PROPOFOL 10 MG/ML
INJECTION, EMULSION INTRAVENOUS PRN
Status: DISCONTINUED | OUTPATIENT
Start: 2022-07-18 | End: 2022-07-18 | Stop reason: SDUPTHER

## 2022-07-18 RX ADMIN — MIDAZOLAM HYDROCHLORIDE 1 MG: 1 INJECTION INTRAMUSCULAR; INTRAVENOUS at 08:19

## 2022-07-18 RX ADMIN — PROPOFOL 300 MG: 10 INJECTION, EMULSION INTRAVENOUS at 08:22

## 2022-07-18 RX ADMIN — ASPIRIN 243 MG: 81 TABLET, CHEWABLE ORAL at 09:13

## 2022-07-18 RX ADMIN — SODIUM CHLORIDE: 9 INJECTION, SOLUTION INTRAVENOUS at 08:15

## 2022-07-18 ASSESSMENT — LIFESTYLE VARIABLES: SMOKING_STATUS: 1

## 2022-07-18 NOTE — PROGRESS NOTES
Extended Stay Recovery Discharge Documentation    Final procedure site check completed, stable for discharge- Yes  Ambulated without issue post recovery completion, gait steady. Peripheral IV sites removed (see IV Flowsheet) and site asymptomatic- Yes  Patient dressed self without assistance. Discharge instructions reviewed with Provider and verbalized understanding.    Patient discharged Home with mother at 1500PM  Monitor cleaned and placed back in nurses' station- Yes

## 2022-07-18 NOTE — ANESTHESIA PRE PROCEDURE
Department of Anesthesiology  Preprocedure Note       Name:  Ari Millan   Age:  74 y.o.  :  1955                                          MRN:  [id number]         Date:  2022      Surgeon: * No surgeons listed *    Procedure: MICHAEL    Medications prior to admission:   Prior to Admission medications    Medication Sig Start Date End Date Taking? Authorizing Provider   sacubitril-valsartan (ENTRESTO) 49-51 MG per tablet Take 1 tablet by mouth 2 times daily 22   AILEEN Duong CNP   mineral oil-hydrophilic petrolatum (AQUAPHOR) ointment Apply topically as needed. 22   Leonidas Moore PA-C   bumetanide (BUMEX) 1 MG tablet Take 1 tablet by mouth daily 22   AILEEN Duong CNP   spironolactone (ALDACTONE) 25 MG tablet Take 1 tablet by mouth daily 22   AILEEN Duong CNP   metoprolol succinate (TOPROL XL) 100 MG extended release tablet Take 1 tablet by mouth in the morning and at bedtime 22   AILEEN Duong CNP   allopurinol (ZYLOPRIM) 100 MG tablet Take 100 mg by mouth 2 times daily    Historical Provider, MD   atorvastatin (LIPITOR) 40 MG tablet Take 40 mg by mouth at bedtime    Historical Provider, MD   loratadine (CLARITIN) 10 MG tablet Take 10 mg by mouth daily    Historical Provider, MD   aspirin 81 MG EC tablet Take 81 mg by mouth daily    Historical Provider, MD   albuterol sulfate  (90 Base) MCG/ACT inhaler Inhale 2 puffs into the lungs every 4 hours as needed for Wheezing 22   Leonidas Moore PA-C   meloxicam (MOBIC) 7.5 MG tablet Take 1 tablet by mouth daily as needed for Pain 7/15/21   Priscila Bradley DO       Current medications:    Current Outpatient Medications   Medication Sig Dispense Refill    sacubitril-valsartan (ENTRESTO) 49-51 MG per tablet Take 1 tablet by mouth 2 times daily 60 tablet 3    mineral oil-hydrophilic petrolatum (AQUAPHOR) ointment Apply topically as needed.  99 g 0    bumetanide (BUMEX) 1 MG tablet Take 1 tablet by mouth daily 30 tablet 3    spironolactone (ALDACTONE) 25 MG tablet Take 1 tablet by mouth daily 30 tablet 3    metoprolol succinate (TOPROL XL) 100 MG extended release tablet Take 1 tablet by mouth in the morning and at bedtime 60 tablet 3    allopurinol (ZYLOPRIM) 100 MG tablet Take 100 mg by mouth 2 times daily      atorvastatin (LIPITOR) 40 MG tablet Take 40 mg by mouth at bedtime      loratadine (CLARITIN) 10 MG tablet Take 10 mg by mouth daily      aspirin 81 MG EC tablet Take 81 mg by mouth daily      albuterol sulfate  (90 Base) MCG/ACT inhaler Inhale 2 puffs into the lungs every 4 hours as needed for Wheezing 18 g 5     No current facility-administered medications for this encounter. Allergies:     Allergies   Allergen Reactions    Pcn [Penicillins]        Problem List:    Patient Active Problem List   Diagnosis Code    Hypertension I10    Mild intermittent asthma without complication T48.47    Pure hypercholesterolemia E78.00    Tobacco abuse Z72.0    Acute decompensated heart failure ([facility] [address] I50.9    Acute combined systolic and diastolic congestive heart failure ([facility] [facility] I50.41    Chronic renal disease, stage III ([facility] [address] [facility] [984468] N18.30       Past Medical History:        Diagnosis Date    Asthma     CHF (congestive heart failure) (HCC)     CKD (chronic kidney disease)     HLD (hyperlipidemia)     Hypertension     Valvular heart disease        Past Surgical History:        Procedure Laterality Date    KIDNEY SURGERY      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    TONSILLECTOMY      TUBAL LIGATION         Social History:    Social History     Tobacco Use    Smoking status: Every Day     Packs/day: 1.00     Years: 23.00     Pack years: 23.00     Types: Cigarettes     Start date: 7/8/1999    Smokeless tobacco: Never   Substance Use Topics    Alcohol use: Not Currently                                Ready to quit: Not Answered  Counseling given: Not Answered      Vital Signs (Current):   Vitals:    07/18/22 0729   BP: (!) 194/57   Pulse: 77   Resp: 13   Temp: 36.3 °C (97.3 °F)   TempSrc: Tympanic   SpO2: 98%   Weight: 128 lb (58.1 kg)   Height: 5' 5\" (1.651 m)                                              BP Readings from Last 3 Encounters:   07/18/22 (!) 194/57   07/12/22 (!) 160/76   07/01/22 120/62       NPO Status:  > 8 HOURS                                                                                BMI:   Wt Readings from Last 3 Encounters:   07/18/22 128 lb (58.1 kg)   07/12/22 125 lb (56.7 kg)   07/01/22 122 lb (55.3 kg)     Body mass index is 21.3 kg/m². CBC:   Lab Results   Component Value Date/Time    WBC 6.7 05/25/2022 04:44 AM    RBC 3.79 05/25/2022 04:44 AM    HGB 11.9 05/25/2022 04:44 AM    HCT 37.3 05/25/2022 04:44 AM    MCV 98.4 05/25/2022 04:44 AM    RDW 14.2 05/25/2022 04:44 AM     05/25/2022 04:44 AM       CMP:   Lab Results   Component Value Date/Time     07/12/2022 12:49 PM    K 4.6 07/12/2022 12:49 PM    K 4.1 05/24/2022 10:05 AM     07/12/2022 12:49 PM    CO2 23 07/12/2022 12:49 PM    BUN 21 07/12/2022 12:49 PM    CREATININE 1.3 07/12/2022 12:49 PM    GFRAA 50 07/12/2022 12:49 PM    LABGLOM 50 07/12/2022 12:49 PM    GLUCOSE 74 07/12/2022 12:49 PM    PROT 5.3 05/26/2022 05:05 AM    CALCIUM 9.7 07/12/2022 12:49 PM    BILITOT 0.6 05/26/2022 05:05 AM    ALKPHOS 64 05/26/2022 05:05 AM    AST 21 05/26/2022 05:05 AM    ALT 61 05/26/2022 05:05 AM       POC Tests: No results for input(s): POCGLU, POCNA, POCK, POCCL, POCBUN, POCHEMO, POCHCT in the last 72 hours.     Coags:   Lab Results   Component Value Date/Time    PROTIME 11.1 11/03/2013 05:45 PM    INR 1.0 11/03/2013 05:45 PM    APTT 33.3 11/03/2013 05:45 PM       HCG (If Applicable): No results found for: PREGTESTUR, PREGSERUM, HCG, HCGQUANT     ABGs: No results found for: PHART, PO2ART, LBF1DPU, MQY7RUB, BEART, O6UEXZEC     Type & Screen (If Applicable):  No results found for: LABABO, [address]    Drug/Infectious Status (If Applicable):  No results found for: HIV, HEPCAB    COVID-19 Screening (If Applicable): No results found for: COVID19        Anesthesia Evaluation  Patient summary reviewed and Nursing notes reviewed no history of anesthetic complications:   Airway: Mallampati: III  TM distance: >3 FB   Neck ROM: full  Mouth opening: > = 3 FB   Dental:          Pulmonary:   (+) asthma: current smoker                           Cardiovascular:  Exercise tolerance: poor (<4 METS),   (+) hypertension:, valvular problems/murmurs: AI, CHF:, hyperlipidemia      ECG reviewed  Rhythm: regular  Rate: normal  Echocardiogram reviewed               ROS comment: EF 30%     Neuro/Psych:               GI/Hepatic/Renal:   (+) renal disease:,           Endo/Other:                     Abdominal:             Vascular: Other Findings:           Anesthesia Plan      MAC     ASA 3       Induction: intravenous. Anesthetic plan and risks discussed with patient. Plan discussed with attending.                     AILEEN Blas - CRNA   7/18/2022

## 2022-07-18 NOTE — PROCEDURES
[address]                  [address] [place] [facility]                            CARDIAC CATHETERIZATION    PATIENT NAME: Marcela Smith                       :        1955  MED REC NO:   [id number]                            ROOM:  ACCOUNT NO:   [de-identified]                           ADMIT DATE: 2022  PROVIDER:     Tran Thomas MD    DATE OF PROCEDURE:  2022    PROCEDURE:  Selective coronary angiography. The procedure was done through right radial approach using ultrasound  guidance. The patient received intravenous Versed and intravenous fentanyl for  sedation. INDICATION:  Severe aortic stenosis. AUC:  7-70. PRESSURES:  Aorta 133/44 with a mean of 75. The aortic root and the aortic valve appeared calcified on fluoroscopy. CORONARY ANGIOGRAPHY:  Left main:  The left main artery did not appear to have any significant  angiographic disease. LAD:  The left anterior descending artery is calcified in its middle  segment, and there is around 20% mid vessel narrowing with the rest of  the LAD showing no significant disease. LCX:  The left circumflex is a nondominant vessel which did not appear  to have any significant angiographic disease. RCA:  The right coronary artery is a very large dominant vessel. It is  heavily calcified in its proximal segment with around 40 to 50%  eccentric proximal vessel narrowing. There is around 20 to 30% mid  vessel disease. In the distal vessel, the posterior descending artery  branch and the posterolateral branch did not appear to have any  significant disease. The right radial arterial sheath was removed at the end of the  procedure, and a TR band was applied with adequate hemostasis and with  preservation of pulse. The patient tolerated the procedure well and left the cardiac  catheterization laboratory in stable condition. ESTIMATED BLOOD LOSS:  10 mL.     CONTRAST USED:  55 mL. CONCLUSIONS:  1. Coronary artery disease. a. Left main, no significant disease. b.  LAD, 20% eccentric calcified mid vessel disease. c.  LCX, no significant disease. d.  RCA, large dominant vessel with eccentric 50% calcified proximal  vessel disease and sequential 40 and 30% mid vessel narrowings. 2.  Calcified aortic root and aortic valve.         Belinda Flor MD    D: 07/18/2022 10:26:02       T: 07/18/2022 10:28:31     [email or web address]  Job#: [id number]     Doc#: [id number]    CC:

## 2022-07-19 NOTE — ANESTHESIA POSTPROCEDURE EVALUATION
Department of Anesthesiology  Postprocedure Note    Patient: Kike Zuniga  MRN: [id number]  YOB: 1955  Date of evaluation: 7/19/2022      Procedure Summary     Date: 07/18/22 Room / Location: [facility] CATH LAB; [facility] ECHO    Anesthesia Start: 0967 Anesthesia Stop: 2842    Procedure: SEY MICHAEL CARDIAC CATH W/ ANES Diagnosis:       Nonrheumatic aortic (valve) stenosis      Aortic stenosis, severe    Scheduled Providers: AILEEN Flores - CRNA;  Morgan Villasenor MD Responsible Provider: Morgan Villasenor MD    Anesthesia Type: MAC ASA Status: 3          Anesthesia Type: MAC    Joel Phase I:      Joel Phase II:        Anesthesia Post Evaluation    Patient location during evaluation: PACU  Patient participation: complete - patient participated  Level of consciousness: awake  Pain score: 0  Airway patency: patent  Nausea & Vomiting: no nausea  Complications: no  Cardiovascular status: hemodynamically stable  Respiratory status: acceptable  Hydration status: stable

## 2022-07-21 ENCOUNTER — HOSPITAL ENCOUNTER (OUTPATIENT)
Dept: OTHER | Age: 67
Setting detail: THERAPIES SERIES
Discharge: HOME | End: 2022-07-21
Payer: MEDICARE

## 2022-07-25 ENCOUNTER — OFFICE VISIT (OUTPATIENT)
Dept: PRIMARY CARE CLINIC | Age: 67
End: 2022-07-25
Payer: MEDICAID

## 2022-07-25 ENCOUNTER — TELEPHONE (OUTPATIENT)
Dept: ADMINISTRATIVE | Age: 67
End: 2022-07-25

## 2022-07-25 VITALS
SYSTOLIC BLOOD PRESSURE: 120 MMHG | DIASTOLIC BLOOD PRESSURE: 60 MMHG | BODY MASS INDEX: 21.33 KG/M2 | OXYGEN SATURATION: 100 % | HEIGHT: 65 IN | HEART RATE: 65 BPM | RESPIRATION RATE: 16 BRPM | WEIGHT: 128 LBS | TEMPERATURE: 97.1 F

## 2022-07-25 DIAGNOSIS — N18.30 STAGE 3 CHRONIC KIDNEY DISEASE, UNSPECIFIED WHETHER STAGE 3A OR 3B CKD (HCC): ICD-10-CM

## 2022-07-25 DIAGNOSIS — I10 PRIMARY HYPERTENSION: ICD-10-CM

## 2022-07-25 DIAGNOSIS — I50.41 ACUTE COMBINED SYSTOLIC AND DIASTOLIC CONGESTIVE HEART FAILURE (HCC): ICD-10-CM

## 2022-07-25 DIAGNOSIS — I35.0 SEVERE AORTIC STENOSIS: Primary | ICD-10-CM

## 2022-07-25 PROCEDURE — 99214 OFFICE O/P EST MOD 30 MIN: CPT | Performed by: PHYSICIAN ASSISTANT

## 2022-07-25 PROCEDURE — 1123F ACP DISCUSS/DSCN MKR DOCD: CPT | Performed by: PHYSICIAN ASSISTANT

## 2022-07-25 NOTE — TELEPHONE ENCOUNTER
Patient of Dr Gavin Tomlin called, confused on what she is supposed to do; saw Vilma Flores today, had the catheterization done on 7/18/22, was told everything is good but Sandip Hirsch told her she still needed to have the surgery. Please call her back at [phone], okay to leave a message as she went back to work.

## 2022-07-25 NOTE — PROGRESS NOTES
Chief Complaint   Patient presents with    Follow-up     HEART  CATHETERIZATION       HPI:  Patient is here for follow-up of heart cath done through radial approach on 7/18 by Dr Lynne Mcelroy. Her primary cardiologist is Dr Latrice Lambert. Results reviewed with the patient:   1. Coronary artery disease. a. Left main, no significant disease. b.  LAD, 20% eccentric calcified mid vessel disease. c.  LCX, no significant disease. d.  RCA, large dominant vessel with eccentric 50% calcified proximal  vessel disease and sequential 40 and 30% mid vessel narrowings. 2.  Calcified aortic root and aortic valve. she is under the assumption that Dr Lynne Mcelroy told her he would see her in 1 year, and does not need valve surgery. Patient's past medical, surgical, social and/or family history reviewed, updated in chart, and are non-contributory (unless otherwise stated). Medications and allergies also reviewed and updated in chart. Review of Systems:  Constitutional:  No fever, no fatigue, no chills, no headaches, no weight change  Dermatology:  No rash, no mole, no dry or sensitive skin  ENT:  No cough, no sore throat, no sinus pain, no runny nose, no ear pain  Cardiology:  No chest pain, no palpitations, no leg edema, no shortness of breath, no PND  Gastroenterology:  No dysphagia, no abdominal pain, no nausea, no vomiting, no constipation, no diarrhea, no heartburn  Musculoskeletal:  No joint pain, no leg cramps, no back pain, no muscle aches  Respiratory:  No shortness of breath, no orthopnea, no wheezing, no ALMEIDA, no hemoptysis  Urology:  No blood in the urine, no urinary frequency, no urinary incontinence, no urinary urgency, no nocturia, no dysuria    Vitals:    07/25/22 1017   BP: 120/60   Pulse: 65   Resp: 16   Temp: 97.1 °F (36.2 °C)   SpO2: 100%   Weight: 128 lb (58.1 kg)   Height: 5' 5\" (1.651 m)       Physical Exam  Constitutional:       General: She is not in acute distress. Appearance: Normal appearance.  She is well-developed. HENT:      Head: Normocephalic and atraumatic. Right Ear: External ear normal.      Left Ear: External ear normal.      Nose: Nose normal.   Eyes:      General: No scleral icterus. Conjunctiva/sclera: Conjunctivae normal.      Pupils: Pupils are equal, round, and reactive to light. Neck:      Thyroid: No thyromegaly. Cardiovascular:      Rate and Rhythm: Normal rate and regular rhythm. Heart sounds: Murmur heard. Systolic murmur is present. Diastolic murmur is present. Pulmonary:      Effort: Pulmonary effort is normal. No accessory muscle usage or respiratory distress. Breath sounds: Normal breath sounds. No wheezing. Musculoskeletal:         General: Normal range of motion. Cervical back: Normal range of motion and neck supple. Skin:     General: Skin is warm and dry. Findings: No rash. Neurological:      Mental Status: She is alert and oriented to person, place, and time. Deep Tendon Reflexes: Reflexes are normal and symmetric. Psychiatric:         Mood and Affect: Mood and affect normal.         Speech: Speech normal.         Behavior: Behavior normal.       Assessment/Plan:      Claire Lopez was seen today for follow-up. Diagnoses and all orders for this visit:    Severe aortic stenosis  -explained cath results to patient and that this was part of the workup prior to intervention on the valve. Advised she call Dr Edwin Zamorano office to schedule further. Acute combined systolic and diastolic congestive heart failure (HCC)  -euvolemic  Primary hypertension  -stable on current meds    Stage 3 chronic kidney disease, unspecified whether stage 3a or 3b CKD ([address]  -stable    As above. Call or go to ED immediately if symptoms worsen or persist.  Return in about 4 weeks (around 8/22/2022). , or sooner if necessary.       Educational materials and/or home exercises printed for patient's review and were included in patient instructions on his/her After Visit Summary and given to patient at the end of visit. Counseled regarding above diagnosis, including possible risks and complications,  especially if left uncontrolled. Counseled regarding the possible side effects, risks, benefits and alternatives to treatment; patient and/or guardian verbalizes understanding, agrees, feels comfortable with and wishes to proceed with above treatment plan. Advised patient to call with any new medication issues, and read all Rx info from pharmacy to assure aware of all possible risks and side effects of medication before taking. Reviewed age and gender appropriate health screening exams and vaccinations. Advised patient regarding importance of keeping up with recommended health maintenance and to schedule as soon as possible if overdue, as this is important in assessing for undiagnosed pathology, especially cancer, as well as protecting against potentially harmful/life threatening disease. Patient and/or guardian verbalizes understanding and agrees with above counseling, assessment and plan. All questions answered. Li Bull PA-C  7/25/2022    I have personally reviewed and updated the chief complaint, HPI, Past Medical, Family and Social History, as well as the above Review of Systems.

## 2022-08-10 DIAGNOSIS — M54.41 ACUTE BILATERAL LOW BACK PAIN WITH RIGHT-SIDED SCIATICA: ICD-10-CM

## 2022-08-10 DIAGNOSIS — J45.21 MILD INTERMITTENT ASTHMA WITH ACUTE EXACERBATION: ICD-10-CM

## 2022-08-10 RX ORDER — MELOXICAM 7.5 MG/1
TABLET ORAL
Qty: 30 TABLET | Refills: 5 | OUTPATIENT
Start: 2022-08-10

## 2022-08-12 ENCOUNTER — HOSPITAL ENCOUNTER (OUTPATIENT)
Dept: OTHER | Age: 67
Setting detail: THERAPIES SERIES
Discharge: HOME | End: 2022-08-12
Payer: MEDICAID

## 2022-08-12 ENCOUNTER — TELEPHONE (OUTPATIENT)
Dept: CARDIOLOGY CLINIC | Age: 67
End: 2022-08-12

## 2022-08-12 VITALS
HEART RATE: 66 BPM | WEIGHT: 131.7 LBS | RESPIRATION RATE: 16 BRPM | OXYGEN SATURATION: 100 % | DIASTOLIC BLOOD PRESSURE: 74 MMHG | SYSTOLIC BLOOD PRESSURE: 158 MMHG | BODY MASS INDEX: 21.92 KG/M2

## 2022-08-12 DIAGNOSIS — I50.9 CONGESTIVE HEART FAILURE, UNSPECIFIED HF CHRONICITY, UNSPECIFIED HEART FAILURE TYPE (HCC): Primary | ICD-10-CM

## 2022-08-12 DIAGNOSIS — Z79.899 MEDICATION DOSE INCREASED: ICD-10-CM

## 2022-08-12 LAB
ANION GAP SERPL CALCULATED.3IONS-SCNC: 8 MMOL/L (ref 7–16)
BUN BLDV-MCNC: 15 MG/DL (ref 6–23)
CALCIUM SERPL-MCNC: 9.4 MG/DL (ref 8.6–10.2)
CHLORIDE BLD-SCNC: 106 MMOL/L (ref 98–107)
CO2: 27 MMOL/L (ref 22–29)
CREAT SERPL-MCNC: 1.1 MG/DL (ref 0.5–1)
GFR AFRICAN AMERICAN: >60
GFR NON-AFRICAN AMERICAN: >60 ML/MIN/1.73
GLUCOSE BLD-MCNC: 82 MG/DL (ref 74–99)
POTASSIUM SERPL-SCNC: 4 MMOL/L (ref 3.5–5)
PRO-BNP: 3641 PG/ML (ref 0–125)
SODIUM BLD-SCNC: 141 MMOL/L (ref 132–146)

## 2022-08-12 PROCEDURE — 36415 COLL VENOUS BLD VENIPUNCTURE: CPT

## 2022-08-12 PROCEDURE — 80048 BASIC METABOLIC PNL TOTAL CA: CPT

## 2022-08-12 PROCEDURE — 83880 ASSAY OF NATRIURETIC PEPTIDE: CPT

## 2022-08-12 PROCEDURE — 99214 OFFICE O/P EST MOD 30 MIN: CPT

## 2022-08-12 NOTE — TELEPHONE ENCOUNTER
----- Message from AILEEN Wills CNP sent at 8/12/2022  1:25 PM EDT -----  Labs and CHF clinic note reviewed  /74, HR 66     Current GDMT:  Entresto 49/51 mg BID  Toprol 100 mg BID  Aldactone 25 mg daily  Bumex 1 mg daily     Increase Entresto to high dose  Follow up labs in 1 week    Thank you

## 2022-08-12 NOTE — RESULT ENCOUNTER NOTE
Labs and CHF clinic note reviewed  /74, HR 66     Current GDMT:  Entresto 49/51 mg BID  Toprol 100 mg BID  Aldactone 25 mg daily  Bumex 1 mg daily     Increase Entresto to high dose  Follow up labs in 1 week    Thank you

## 2022-08-12 NOTE — TELEPHONE ENCOUNTER
----- Message from AILEEN Hilton CNP sent at 8/12/2022  1:25 PM EDT -----  Labs and CHF clinic note reviewed  /74, HR 66     Current GDMT:  Entresto 49/51 mg BID  Toprol 100 mg BID  Aldactone 25 mg daily  Bumex 1 mg daily     Increase Entresto to high dose  Follow up labs in 1 week    Thank you

## 2022-08-12 NOTE — PROGRESS NOTES
Congestive Heart Failure [address] [facility]   1955          Referring Provider: Dr. Charlie Crabtree  Primary Care Physician: Elen FERGUSON  Cardiologist:  DR. Maximo Ruiz  Nephrologist:N/A        History of Present Illness:     Michelle Miles is a 74 y.o. female with a history of HFrEF, most recent EF 30% ON 5/25/22. Patient Story:    She does  have dyspnea with exertion, shortness of breath, or decline in overall functional capacity. She does not have orthopnea, PND, nocturnal cough or hemoptysis. She does not have abdominal distention or bloating, early satiety, anorexia/change in appetite. She does has a good urinary response to  oral diuretic. She does not have  lower extremity edema. She denies lightheadedness, dizziness. She denies palpitations, syncope or near syncope. She does not complain of chest pain, pressure, discomfort. Allergies   Allergen Reactions    Pcn [Penicillins]            Prior to Visit Medications    Medication Sig Taking? Authorizing Provider   VENTOLIN  (90 Base) MCG/ACT inhaler Inhale 2 puffs into the lungs every 4 hours as needed for Wheezing  Nayana Ward PA-C   sacubitril-valsartan (ENTRESTO) 49-51 MG per tablet Take 1 tablet by mouth 2 times daily  AILEEN Vegas CNP   mineral oil-hydrophilic petrolatum (AQUAPHOR) ointment Apply topically as needed.   Nayana Ward PA-C   bumetanide (BUMEX) 1 MG tablet Take 1 tablet by mouth daily  AILEEN Vegas CNP   spironolactone (ALDACTONE) 25 MG tablet Take 1 tablet by mouth daily  AILEEN Vegas CNP   metoprolol succinate (TOPROL XL) 100 MG extended release tablet Take 1 tablet by mouth in the morning and at bedtime  AILEEN Vegas CNP   allopurinol (ZYLOPRIM) 100 MG tablet Take 100 mg by mouth 2 times daily  Historical Provider, MD   atorvastatin (LIPITOR) 40 MG tablet Take 40 mg by mouth at bedtime  Historical Provider, MD   loratadine (CLARITIN) 10 MG tablet Take 10 mg by mouth daily  Historical Provider, MD   aspirin 81 MG EC tablet Take 81 mg by mouth daily  Historical Provider, MD   meloxicam (MOBIC) 7.5 MG tablet Take 1 tablet by mouth daily as needed for Pain  Cherelle Riddle DO           Guideline directed medical:  ARNI/ACE I/ARB: Yes  Beta blocker:   Yes  Aldosterone antagonist:  Yes        Physical Examination:     BP (!) 158/74   Pulse 66   Resp 16   Wt 131 lb 11.2 oz (59.7 kg)   SpO2 100%   BMI 21.92 kg/m²     Assessment  Charting Type: Shift assessment              HEENT (Head, Ears, Eyes, Nose, & Throat)  HEENT (WDL): Exceptions to WDL  Right Eye: Glasses  Left Eye: Glasses    Respiratory  Respiratory Pattern: Regular  Respiratory Depth: Normal  Respiratory Quality/Effort: Dyspnea with exertion  Chest Assessment: Chest expansion symmetrical  L Breath Sounds: Diminished, Rhonchi  R Breath Sounds: Diminished, Rhonchi              Cardiac  Cardiac Rhythm: Sinus rhythm    Rhythm Interpretation  Heart Rate: 66         Gastrointestinal  Abdominal (WDL): Within Defined Limits               Peripheral Vascular  Peripheral Vascular (WDL): Within Defined Limits  Edema: None                                                 Heart Rate: 66                     LAB DATA:    Last 3 BMP      Sodium (mmol/L)   Date Value   07/12/2022 142   07/01/2022 140   06/13/2022 140     Potassium (mmol/L)   Date Value   07/12/2022 4.6   07/01/2022 3.6   06/13/2022 4.2     Potassium reflex Magnesium (mmol/L)   Date Value   05/24/2022 4.1     Chloride (mmol/L)   Date Value   07/12/2022 103   07/01/2022 101   06/13/2022 108 (H)     CO2 (mmol/L)   Date Value   07/12/2022 23   07/01/2022 18 (L)   06/13/2022 22     BUN (mg/dL)   Date Value   07/12/2022 21   07/01/2022 31 (H)   06/13/2022 25 (H)     Glucose (mg/dL)   Date Value   07/12/2022 74   07/01/2022 142 (H)   06/13/2022 87     Calcium (mg/dL)   Date Value   07/12/2022 9.7   07/01/2022 9.2   06/13/2022 8.7 Last 3 BNP       Pro-BNP (pg/mL)   Date Value   07/12/2022 3,834 (H)   07/01/2022 2,883 (H)   06/13/2022 28,263 (H)          CBC: No results for input(s): WBC, HGB, PLT in the last 72 hours. BMP:  No results for input(s): NA, K, CL, CO2, BUN, CREATININE, GLUCOSE in the last 72 hours. Hepatic: No results for input(s): AST, ALT, ALB, BILITOT, ALKPHOS in the last 72 hours. Troponin: No results for input(s): TROPONINI in the last 72 hours. BNP: No results for input(s): BNP in the last 72 hours. Lipids: No results for input(s): CHOL, HDL in the last 72 hours. Invalid input(s): LDLCALCU  INR: No results for input(s): INR in the last 72 hours. WEIGHTS:    Wt Readings from Last 3 Encounters:   08/12/22 131 lb 11.2 oz (59.7 kg)   07/25/22 128 lb (58.1 kg)   07/18/22 128 lb (58.1 kg)         TELEMETRY:  Cardiac Regularity: REGULAR  Cardiac Rhythm/Interpretation:NSR        ASSESSMENT:  Megha Lowe is evolemic with stable weights . Weight is up 6 lbs in 1 month. States appetite is greatly improved and she is purposely trying to gain weight.     Interventions completed this visit:  IV diuretics given no  Lab work obtained yes, BNP/BMP   Reviewed currently prescribed medications with patient, educated on importance of compliance and answered any questions regarding their medication  Educated on signs and symptoms of HF  Educated on low sodium diet    PLAN:  Future Appointments   Date Time Provider Chin [place]   8/12/2022 12:15 PM [facility] CHF ROOM 1 YZ CHF [facility]   8/22/2022 10:00 AM [facility]   8/25/2022 12:30 PM Shane Chaparro, DO CARDIO SURG [facility]   9/1/2022  9:00 AM [facility] MOBILE [facility] RM 1 SEYZ MOBILE [facility] Radiolo   9/15/2022 12:15 PM [facility] CHF ROOM 1 SEYZ CHF [facility]   5/3/2023 10:30 AM [facility]       Scheduled to follow up in CHF clinic on   Future Appointments   Date Time Provider Chin Wesley   8/12/2022 12:15 PM [facility] CHF ROOM 1 [facility]   8/22/2022 10:00 AM [facility]   8/25/2022 12:30 PM Cristhian bYarra DO CARDIO SURG [facility]   9/1/2022  9:00 AM [facility] MOBILE [facility] RM 1 SEYZ MOBILE [facility]   5/3/2023 10:30 AM [facility]     Given clinic phone number and aware of signs and symptoms to call with any HF change in symptoms. Denies current complaints. Assessmeny unremarkable.

## 2022-08-15 RX ORDER — FLUTICASONE PROPIONATE 50 MCG
SPRAY, SUSPENSION (ML) NASAL
Qty: 16 G | Refills: 5 | Status: SHIPPED | OUTPATIENT
Start: 2022-08-15

## 2022-08-23 NOTE — TELEPHONE ENCOUNTER
URGENT referral still in the [facility] workque - appears pt needs a HFU apt/timing with Dr. Loleta Babinski. Please call pt with appropriate apt/recommendations. Thank you.

## 2022-08-24 ENCOUNTER — TELEPHONE (OUTPATIENT)
Dept: PRIMARY CARE CLINIC | Age: 67
End: 2022-08-24

## 2022-08-24 NOTE — PROGRESS NOTES
[facility]      Patient name: Mark Tom    Reason for consult: AS    Primary Care Physician: Shirley Mo PA-C    Date of service: 8/25/2022    Chief Complaint: AS    HPI: This is a 74y.o.-year-old female presenting to the [address] [place] [address] for continued evaluation of her AS. She was last seen in September of 2021. At that time, given her young age and low STS risk, it was felt she was better served with SAVR and surgery was recommended. She did begin the process of her workup for surgery however did not return. She was recently admitted for acute heart failure in May of this year. GDMT was adjusted. She presents back to the Valve clinic to discuss further plan of care. She does report SOB with activity and LE edema. She denies additional complaints. Focused past medical history:          Diagnosis Date    Asthma     CHF (congestive heart failure) (HCC)     CKD (chronic kidney disease)     HLD (hyperlipidemia)     Hypertension     Valvular heart disease           Allergies: Allergies   Allergen Reactions    Pcn [Penicillins]          Home medications:    Current Outpatient Medications   Medication Sig Dispense Refill    VENTOLIN  (90 Base) MCG/ACT inhaler Inhale 2 puffs into the lungs every 4 hours as needed for Wheezing 18 g 5    fluticasone (FLONASE) 50 MCG/ACT nasal spray 2 SPRAYS BY NASAL ROUTE DAILY 16 g 5    sacubitril-valsartan (ENTRESTO)  MG per tablet Take 1 tablet by mouth in the morning and 1 tablet before bedtime. 60 tablet 3    mineral oil-hydrophilic petrolatum (AQUAPHOR) ointment Apply topically as needed.  99 g 0    bumetanide (BUMEX) 1 MG tablet Take 1 tablet by mouth daily 30 tablet 3    spironolactone (ALDACTONE) 25 MG tablet Take 1 tablet by mouth daily 30 tablet 3    metoprolol succinate (TOPROL XL) 100 MG extended release tablet Take 1 tablet by mouth in the morning and at bedtime 60 tablet 3    allopurinol (ZYLOPRIM) 100 MG tablet Take 100 mg by mouth 2 times daily      atorvastatin (LIPITOR) 40 MG tablet Take 40 mg by mouth at bedtime      loratadine (CLARITIN) 10 MG tablet Take 10 mg by mouth daily      aspirin 81 MG EC tablet Take 81 mg by mouth daily       No current facility-administered medications for this visit.          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    TONSILLECTOMY      TUBAL LIGATION           Social History:  Social History     Socioeconomic History    Marital status:      Spouse name: Not on file    Number of children: Not on file    Years of education: Not on file    Highest education level: Not on file   Occupational History    Not on file   Tobacco Use    Smoking status: Every Day     Packs/day: 1.00     Years: 23.00     Pack years: 23.00     Types: Cigarettes     Start date: 7/8/1999    Smokeless tobacco: Never   Vaping Use    Vaping Use: Former    Substances: Flavoring   Substance and Sexual Activity    Alcohol use: Not Currently    Drug use: Never    Sexual activity: Yes     Partners: Male   Other Topics Concern    Not on file   Social History Narrative    Drinks 2-3 glasses iced tea     Social Determinants of Health     Financial Resource Strain: Low Risk     Difficulty of Paying Living Expenses: Not hard at all   Food Insecurity: No Food Insecurity    Worried About Running Out of Food in the Last Year: Never true    Ran Out of Food in the Last Year: Never true   Transportation Needs: Not on file   Physical Activity: Inactive    Days of Exercise per Week: 0 days    Minutes of Exercise per Session: 0 min   Stress: Not on file   Social Connections: Not on file   Intimate Partner Violence: Not on file   Housing Stability: Not on file         Family History:  Family History   Problem Relation Age of Onset    Stroke Mother     Diabetes Mother     High Blood Pressure Mother     Heart Surgery Father 67    Hypertension Father          Review of Systems:  Constitutional: Denies fevers, chills, or weight loss. HEENT: Denies visual changes or hearing loss. Heart: As per HPI. Lungs: Denies shortness of breath, cough, or wheezing. Gastrointestinal: Denies nausea, vomiting, constipation, or diarrhea. Genitourinary: dysuria or hematuria. Psychiatric: Patient denies anxiety or depression. Neurologic: Patient denies weakness of the extremities, dizziness, or headaches. All other ROS checked and found to be negative. Objective:  General Appearance: Pleasant 74y.o. year old female who appears stated age. Communicates well, no acute distress. HEENT: Head is normocephalic, atraumatic. EOMs intact, PERRL. Trachea midline. Lungs: Normal respiratory rate and normal effort. She is not in respiratory distress. Breath sounds clear to auscultation. No wheezes. Heart: Normal rate. Regular rhythm. S1 normal and S2 normal. Positive for murmur. Chest: Symmetric chest wall expansion. Extremities: Normal range of motion. Neurological: Patient is alert and oriented to person, place and time. Patient has normal reflexes. Skin: Warm and dry. Abdomen: Abdomen is soft and non-distended. Bowel sounds are normal. There is no abdominal tenderness tenderness. There is no guarding. There is no mass. Pulses: Distal pulses are intact. Skin: Warm and dry without lesions.         Assessment:   Patient Active Problem List   Diagnosis    Hypertension    Mild intermittent asthma without complication    Pure hypercholesterolemia    Tobacco abuse    Acute decompensated heart failure (HCC)    Acute combined systolic and diastolic congestive heart failure (HCC)    Chronic renal disease, stage III ([facility]) [261643]    Severe aortic stenosis     Severe AS/AI  EF35%      Plan:   Surgery was recommended over a year ago   She did not follow up  She returns now with a recent MICHAEL and [facility]   Unfortunately she now has cardiomyopathy with an EF of 35%  Surgery was once again recommended, natural history of AS was explained including ultimately heart failure and death if surgery is avoided  Despite this, she is not agreeable to surgery          \"Severe AS Shared Decision Making discussion took place using the [address]  Cardiology AS. TAVR/SAVR tool. The treatment plan formulated by the Heart Team and the patient and the patient's preference for AVR is TAVR. \"    Patient seen and discussed with interventional/structural heart cardiologist Dr. Kelly Walker.

## 2022-08-25 ENCOUNTER — OFFICE VISIT (OUTPATIENT)
Dept: CARDIOTHORACIC SURGERY | Age: 67
End: 2022-08-25
Payer: MEDICAID

## 2022-08-25 VITALS
TEMPERATURE: 98 F | DIASTOLIC BLOOD PRESSURE: 74 MMHG | HEIGHT: 65 IN | WEIGHT: 131 LBS | BODY MASS INDEX: 21.83 KG/M2 | SYSTOLIC BLOOD PRESSURE: 171 MMHG | HEART RATE: 85 BPM | RESPIRATION RATE: 20 BRPM

## 2022-08-25 DIAGNOSIS — I35.0 AORTIC VALVE STENOSIS, ETIOLOGY OF CARDIAC VALVE DISEASE UNSPECIFIED: Primary | ICD-10-CM

## 2022-08-25 PROCEDURE — 1123F ACP DISCUSS/DSCN MKR DOCD: CPT | Performed by: THORACIC SURGERY (CARDIOTHORACIC VASCULAR SURGERY)

## 2022-08-25 PROCEDURE — 99213 OFFICE O/P EST LOW 20 MIN: CPT | Performed by: THORACIC SURGERY (CARDIOTHORACIC VASCULAR SURGERY)

## 2022-09-01 ENCOUNTER — HOSPITAL ENCOUNTER (OUTPATIENT)
Dept: MAMMOGRAPHY | Age: 67
Discharge: HOME | End: 2022-09-03

## 2022-09-01 DIAGNOSIS — Z12.31 BREAST CANCER SCREENING BY MAMMOGRAM: ICD-10-CM

## 2022-09-07 RX ORDER — ALLOPURINOL 100 MG/1
TABLET ORAL
Qty: 60 TABLET | Refills: 1 | Status: SHIPPED | OUTPATIENT
Start: 2022-09-07

## 2022-09-07 RX ORDER — ASPIRIN 81 MG/1
TABLET, COATED ORAL
Qty: 30 TABLET | Refills: 5 | Status: SHIPPED
Start: 2022-09-07 | End: 2022-09-20 | Stop reason: SDUPTHER

## 2022-09-09 RX ORDER — ASPIRIN 81 MG/1
TABLET, COATED ORAL
Qty: 30 TABLET | Refills: 5 | OUTPATIENT
Start: 2022-09-09

## 2022-09-15 ENCOUNTER — HOSPITAL ENCOUNTER (OUTPATIENT)
Dept: OTHER | Age: 67
Setting detail: THERAPIES SERIES
Discharge: HOME | End: 2022-09-15

## 2022-09-16 RX ORDER — ASPIRIN 81 MG/1
TABLET, COATED ORAL
Qty: 30 TABLET | Refills: 5 | OUTPATIENT
Start: 2022-09-16

## 2022-09-19 DIAGNOSIS — M54.41 ACUTE BILATERAL LOW BACK PAIN WITH RIGHT-SIDED SCIATICA: ICD-10-CM

## 2022-09-20 RX ORDER — ASPIRIN 81 MG/1
TABLET, COATED ORAL
Qty: 30 TABLET | Refills: 5 | Status: SHIPPED | OUTPATIENT
Start: 2022-09-20

## 2022-09-21 RX ORDER — MELOXICAM 7.5 MG/1
TABLET ORAL
Qty: 30 TABLET | Refills: 5 | OUTPATIENT
Start: 2022-09-21

## 2022-10-04 ENCOUNTER — HOSPITAL ENCOUNTER (OUTPATIENT)
Dept: OTHER | Age: 67
Setting detail: THERAPIES SERIES
Discharge: HOME | End: 2022-10-04

## 2022-10-18 ENCOUNTER — HOSPITAL ENCOUNTER (OUTPATIENT)
Dept: MAMMOGRAPHY | Age: 67
Discharge: HOME | End: 2022-10-20

## 2022-10-18 DIAGNOSIS — Z12.31 BREAST CANCER SCREENING BY MAMMOGRAM: ICD-10-CM

## 2022-10-18 RX ORDER — SPIRONOLACTONE 25 MG/1
TABLET ORAL
Qty: 30 TABLET | Refills: 3 | Status: SHIPPED | OUTPATIENT
Start: 2022-10-18

## 2022-10-18 RX ORDER — METOPROLOL SUCCINATE 100 MG/1
100 TABLET, EXTENDED RELEASE ORAL 2 TIMES DAILY
Qty: 60 TABLET | Refills: 3 | Status: SHIPPED | OUTPATIENT
Start: 2022-10-18

## 2022-10-18 RX ORDER — BUMETANIDE 1 MG/1
TABLET ORAL
Qty: 30 TABLET | Refills: 3 | Status: SHIPPED | OUTPATIENT
Start: 2022-10-18

## 2022-10-20 ENCOUNTER — TELEPHONE (OUTPATIENT)
Dept: PRIMARY CARE CLINIC | Age: 67
End: 2022-10-20

## 2022-10-20 RX ORDER — FLUTICASONE PROPIONATE 220 UG/1
2 AEROSOL, METERED RESPIRATORY (INHALATION) 2 TIMES DAILY
Qty: 1 EACH | Refills: 3 | Status: SHIPPED | OUTPATIENT
Start: 2022-10-20 | End: 2023-10-20

## 2022-10-25 ENCOUNTER — HOSPITAL ENCOUNTER (OUTPATIENT)
Dept: OTHER | Age: 67
Setting detail: THERAPIES SERIES
Discharge: HOME | End: 2022-10-25

## 2022-11-08 ENCOUNTER — HOSPITAL ENCOUNTER (OUTPATIENT)
Dept: OTHER | Age: 67
Setting detail: THERAPIES SERIES
Discharge: HOME | End: 2022-11-08
Payer: MEDICARE

## 2022-11-08 VITALS
SYSTOLIC BLOOD PRESSURE: 150 MMHG | DIASTOLIC BLOOD PRESSURE: 65 MMHG | HEART RATE: 84 BPM | BODY MASS INDEX: 22.47 KG/M2 | RESPIRATION RATE: 16 BRPM | WEIGHT: 135 LBS | OXYGEN SATURATION: 100 %

## 2022-11-08 LAB
ANION GAP SERPL CALCULATED.3IONS-SCNC: 10 MMOL/L (ref 7–16)
BUN BLDV-MCNC: 15 MG/DL (ref 6–23)
CALCIUM SERPL-MCNC: 9.9 MG/DL (ref 8.6–10.2)
CHLORIDE BLD-SCNC: 104 MMOL/L (ref 98–107)
CO2: 26 MMOL/L (ref 22–29)
CREAT SERPL-MCNC: 1.3 MG/DL (ref 0.5–1)
GFR SERPL CREATININE-BSD FRML MDRD: 45 ML/MIN/1.73
GLUCOSE BLD-MCNC: 100 MG/DL (ref 74–99)
POTASSIUM SERPL-SCNC: 4 MMOL/L (ref 3.5–5)
PRO-BNP: 1573 PG/ML (ref 0–125)
SODIUM BLD-SCNC: 140 MMOL/L (ref 132–146)

## 2022-11-08 PROCEDURE — 80048 BASIC METABOLIC PNL TOTAL CA: CPT

## 2022-11-08 PROCEDURE — 36415 COLL VENOUS BLD VENIPUNCTURE: CPT

## 2022-11-08 PROCEDURE — 99214 OFFICE O/P EST MOD 30 MIN: CPT

## 2022-11-08 PROCEDURE — 83880 ASSAY OF NATRIURETIC PEPTIDE: CPT

## 2022-11-08 NOTE — PROGRESS NOTES
Congestive Heart Failure [address] [facility] [place]   1955          Referring Provider: Dr. Arnaud Cruz  Primary Care Physician: Reji FERGUSON  Cardiologist:  DR. Malik De La Paz  Nephrologist:N/A        History of Present Illness:     Jayda Valdes is a 74 y.o. female with a history of HFrEF, most recent EF 30% ON 5/25/22. Patient Story:    She does have dyspnea with exertion, shortness of breath, or decline in overall functional capacity. She does not have orthopnea, PND, nocturnal cough or hemoptysis. She does not have abdominal distention or bloating, early satiety, anorexia/change in appetite. She does have a good urinary response to oral diuretic. She does not have lower extremity edema. She denies lightheadedness, dizziness. She denies palpitations, syncope or near syncope. She does not complain of chest pain, pressure, discomfort. Allergies   Allergen Reactions    Pcn [Penicillins]            Prior to Visit Medications    Medication Sig Taking? Authorizing Provider   fluticasone (FLOVENT HFA) 220 MCG/ACT inhaler Inhale 2 puffs into the lungs 2 times daily  Tiffanie Tineo PA-C   bumetanide (BUMEX) 1 MG tablet TAKE ONE (1) TABLET DAILY. AILEEN rAnold CNP   metoprolol succinate (TOPROL XL) 100 MG extended release tablet Take 1 tablet by mouth in the morning and at bedtime  AILEEN Arnold CNP   spironolactone (ALDACTONE) 25 MG tablet TAKE ONE (1) TABLET DAILY. Nely Keita MD   ASPIRIN LOW DOSE 81 MG EC tablet take 1 tablet by mouth every day  Tai Herrmann PA-C   allopurinol (ZYLOPRIM) 100 MG tablet TAKE 1 TABLET TWO (2) TIMES A DAY. Tai Herrmann PA-C   fluticasone [facility]) 50 MCG/ACT nasal spray 2 SPRAYS BY NASAL ROUTE DAILY  Tai Herrmann PA-C   sacubitril-valsartan (ENTRESTO)  MG per tablet Take 1 tablet by mouth in the morning and 1 tablet before bedtime.   AILEEN Arnold CNP   VENTOLIN  (90 Base) MCG/ACT inhaler Inhale 2 puffs into the lungs every 4 hours as needed for Wheezing  Jim Delgadillo PA-C   mineral oil-hydrophilic petrolatum (AQUAPHOR) ointment Apply topically as needed. Jim Delgadillo PA-C   atorvastatin (LIPITOR) 40 MG tablet Take 40 mg by mouth at bedtime  Historical Provider, MD   loratadine (CLARITIN) 10 MG tablet Take 10 mg by mouth daily  Historical Provider, MD   meloxicam (MOBIC) 7.5 MG tablet Take 1 tablet by mouth daily as needed for Pain  Stephanie Swain DO           Guideline directed medical:  ARNI/ACE I/ARB: Yes  Beta blocker:   Yes  Aldosterone antagonist:  Yes        Physical Examination:     BP (!) 150/65   Pulse 84   Resp 16   Wt 135 lb (61.2 kg)   SpO2 100%   BMI 22.47 kg/m²     Assessment  Charting Type: Shift assessment (CHF Clinic)    Neurological  Level of Consciousness: Alert (0)         HEENT (Head, Ears, Eyes, Nose, & Throat)  HEENT (WDL): Exceptions to WDL  Right Eye: Glasses  Left Eye: Glasses    Respiratory  Respiratory Pattern: Regular  Respiratory Depth: Normal  Respiratory Quality/Effort: Dyspnea with exertion  Chest Assessment: Chest expansion symmetrical  L Breath Sounds: Diminished, Clear  R Breath Sounds: Diminished, Clear              Cardiac  Cardiac Rhythm: Sinus rhythm    Rhythm Interpretation  Heart Rate: 84         Gastrointestinal  Abdominal (WDL): Within Defined Limits               Peripheral Vascular  Peripheral Vascular (WDL): Within Defined Limits  Edema: None                                                 Heart Rate: 84                     LAB DATA:    Last 3 BMP      Sodium (mmol/L)   Date Value   08/12/2022 141   07/12/2022 142   07/01/2022 140     Potassium (mmol/L)   Date Value   08/12/2022 4.0   07/12/2022 4.6   07/01/2022 3.6     Potassium reflex Magnesium (mmol/L)   Date Value   05/24/2022 4.1     Chloride (mmol/L)   Date Value   08/12/2022 106   07/12/2022 103   07/01/2022 101     CO2 (mmol/L)   Date Value   08/12/2022 27   07/12/2022 23   07/01/2022 18 (L)     BUN (mg/dL)   Date Value   08/12/2022 15   07/12/2022 21   07/01/2022 31 (H)     Glucose (mg/dL)   Date Value   08/12/2022 82   07/12/2022 74   07/01/2022 142 (H)     Calcium (mg/dL)   Date Value   08/12/2022 9.4   07/12/2022 9.7   07/01/2022 9.2       Last 3 BNP       Pro-BNP (pg/mL)   Date Value   08/12/2022 3,641 (H)   07/12/2022 3,834 (H)   07/01/2022 2,883 (H)          CBC: No results for input(s): WBC, HGB, PLT in the last 72 hours. BMP:  No results for input(s): NA, K, CL, CO2, BUN, CREATININE, GLUCOSE in the last 72 hours. Hepatic: No results for input(s): AST, ALT, ALB, BILITOT, ALKPHOS in the last 72 hours. Troponin: No results for input(s): TROPONINI in the last 72 hours. BNP: No results for input(s): BNP in the last 72 hours. Lipids: No results for input(s): CHOL, HDL in the last 72 hours. Invalid input(s): LDLCALCU  INR: No results for input(s): INR in the last 72 hours. WEIGHTS:    Wt Readings from Last 3 Encounters:   11/08/22 135 lb (61.2 kg)   08/25/22 131 lb (59.4 kg)   08/12/22 131 lb 11.2 oz (59.7 kg)         TELEMETRY:  Cardiac Regularity: REGULAR  Cardiac Rhythm/Interpretation: NSR        ASSESSMENT:  Phi Breaux is evolemic with stable weights. Weight is up 4lbs from previous visit 8-12-22. States appetite is greatly improved and does admit to occassional salt intake from potato chips, but states it is very rare.     Interventions completed this visit:  IV diuretics given: No  Lab work obtained: Yes, BNP/BMP   Reviewed currently prescribed medications with patient, educated on importance of compliance and answered any questions regarding their medication  Educated on signs and symptoms of HF  Educated on low sodium diet    PLAN:  Future Appointments   Date Time Provider Chin Wesley   11/10/2022  1:45 PM Meena Rae MD [facility] CARDIO [facility]   11/16/2022  9:30 AM [facility] MOBILE [facility] RM 1 SEYZ MOBILE [facility] Radiolo   1/10/2023 10:00 AM [facility] CHF ROOM 1 [facility] [place] CHF [facility] [place]   5/3/2023 10:30 AM [facility]         Given clinic phone number and aware of signs and symptoms to call with any HF change in symptoms  Denies current complaints. Assessmeny unremarkable.

## 2022-11-09 NOTE — RESULT ENCOUNTER NOTE
Labs and CHF clinic note reviewed  Vitals at CHF clinic: /65   Pulse 84     Current GDMT:  Entresto 97/103 mg BID  Toprol 100 mg BID  Spironolactone 25 mg daily   Bumex 1 mg PRN    Please have her start Jardiance vs Farxiga 10 mg daily (whichever one is covered by her insurance)  Follow up labs in 2-3 weeks    Thank you

## 2022-11-10 ENCOUNTER — TELEPHONE (OUTPATIENT)
Dept: CARDIOLOGY CLINIC | Age: 67
End: 2022-11-10

## 2022-11-10 ENCOUNTER — OFFICE VISIT (OUTPATIENT)
Dept: CARDIOLOGY CLINIC | Age: 67
End: 2022-11-10
Payer: MEDICAID

## 2022-11-10 VITALS
DIASTOLIC BLOOD PRESSURE: 58 MMHG | HEIGHT: 65 IN | BODY MASS INDEX: 22.66 KG/M2 | RESPIRATION RATE: 18 BRPM | SYSTOLIC BLOOD PRESSURE: 116 MMHG | WEIGHT: 136 LBS | HEART RATE: 79 BPM

## 2022-11-10 DIAGNOSIS — Z59.6 PATIENT CANNOT AFFORD MEDICATIONS: ICD-10-CM

## 2022-11-10 DIAGNOSIS — I50.41 ACUTE COMBINED SYSTOLIC AND DIASTOLIC CONGESTIVE HEART FAILURE (HCC): Primary | ICD-10-CM

## 2022-11-10 DIAGNOSIS — I35.0 SEVERE AORTIC STENOSIS: ICD-10-CM

## 2022-11-10 DIAGNOSIS — Z79.899 NEW MEDICATION ADDED: ICD-10-CM

## 2022-11-10 DIAGNOSIS — I50.9 CONGESTIVE HEART FAILURE, UNSPECIFIED HF CHRONICITY, UNSPECIFIED HEART FAILURE TYPE (HCC): Primary | ICD-10-CM

## 2022-11-10 DIAGNOSIS — I50.9 CONGESTIVE HEART FAILURE, UNSPECIFIED HF CHRONICITY, UNSPECIFIED HEART FAILURE TYPE (HCC): ICD-10-CM

## 2022-11-10 DIAGNOSIS — Z13.9 ENCOUNTER FOR SCREENING INVOLVING SOCIAL DETERMINANTS OF HEALTH (SDOH): ICD-10-CM

## 2022-11-10 DIAGNOSIS — I35.1 MODERATE TO SEVERE AORTIC INSUFFICIENCY: ICD-10-CM

## 2022-11-10 PROCEDURE — 3074F SYST BP LT 130 MM HG: CPT | Performed by: INTERNAL MEDICINE

## 2022-11-10 PROCEDURE — 99214 OFFICE O/P EST MOD 30 MIN: CPT | Performed by: INTERNAL MEDICINE

## 2022-11-10 PROCEDURE — 1123F ACP DISCUSS/DSCN MKR DOCD: CPT | Performed by: INTERNAL MEDICINE

## 2022-11-10 PROCEDURE — 3078F DIAST BP <80 MM HG: CPT | Performed by: INTERNAL MEDICINE

## 2022-11-10 PROCEDURE — 93000 ELECTROCARDIOGRAM COMPLETE: CPT | Performed by: INTERNAL MEDICINE

## 2022-11-10 SDOH — ECONOMIC STABILITY - INCOME SECURITY: LOW INCOME: Z59.6

## 2022-11-10 NOTE — TELEPHONE ENCOUNTER
nE=either SGLT2i is pulling into covered benefit. Escribed Jardiance to rx, with request to send office PA if indicated. [address], please investigate prescription coverage if she has any. Open enrollment is now, assist with info for choosing plans. Refer to rx assistance if needed. (You may have to notify the cardiology front office staff if they have not entered insurance correctly in system to pull in pharmacy benefits. Yancy offer samples in the mean time.

## 2022-11-10 NOTE — PATIENT INSTRUCTIONS
Continue all your medications at current doses. I will refer you to cardiac surgery. Start taking jardiance. Take an extra 1 mg bumex for the next 2 days. Restrict sodium intake to less than 2-2.5 g/day. Restrict fluid intake to less than 2.2 L/day. Goal BP is less than 130/80. If your weight increases by > 2 lbs in a day or >5 lbs in more than a day, take an extra bumex pill. Please try to exercise for 150 minutes a week. I will see you back in the office in 3 months. Please call the office at ([phone], option 2) if you have any questions.

## 2022-11-10 NOTE — TELEPHONE ENCOUNTER
----- Message from AILEEN Toledo - CNP sent at 11/9/2022  5:13 PM EST -----  Labs and CHF clinic note reviewed  Vitals at CHF clinic: /65   Pulse 84     Current GDMT:  Entresto 97/103 mg BID  Toprol 100 mg BID  Spironolactone 25 mg daily   Bumex 1 mg PRN    Please have her start Jardiance vs Farxiga 10 mg daily (whichever one is covered by her insurance)  Follow up labs in 2-3 weeks    Thank you

## 2022-11-10 NOTE — PROGRESS NOTES
[facility] Cardiology  Office Visit         Reason for Visit: Heart Failure      History of Present Illness:     Ms. Cherylene Asa is a 74year old female with a PMHx of chronic HFrEF, severe AS, HTN, CKD, asthma and hx tobacco abuse. She presented to ER 5/24/2022 for a 1 month history of worsening lower extremity edema bilaterally and intermittent SOB. She was admitted for acute heart failure, IV diuresed and during hospitalization underwent TTE that demonstrated worsening LV function EF 30%, stage III DD, dilated RV with reduced function and mild TR, moderate MR, severe AS, moderate-severe AI. GDMT was adjusted. She previously was following with valve clinic and scheduled for MICHAEL/LHC on 6/16/2022, she requested discharge from hospital for family event with plans to return for outpatient testing. Subsequently, she underwent cardiac catheterization as well as MICHAEL on 7/18/2022. These are summarized below. Based on the results of the above tests, she was recommended to follow-up with cardiothoracic surgery for consideration of aortic valve replacement. She states that her brother underwent the same procedure and unfortunately passed away. Given that, she is not inclined to pursue that option currently. She is evaluated here to discuss options with me in the office today. She presents today in follow-up, since discharge from the hospital she has been compliant with all her current cardiac medications. She has noted increased shortness of breath, early satiety and lower extremity edema. She reports dyspnea with exertion, shortness of breath, or decline in overall functional capacity. She denies orthopnea, PND, nocturnal cough or hemoptysis. She reports abdominal distention or bloating, early satiety, denies anorexia/change in appetite, unintentional weight loss. She does lower extremity edema. She denies exertional lightheadedness. She denies palpitations, syncope or near syncope.      Review of systems is negative for chest pain, pressure, discomfort. When ambulating on an incline, She does not leg claudication. History is negative for neurological symptoms including transient loss of vision, asymmetric weakness, aphasia, dysphasia, numbness, tingling. Patient Active Problem List    Diagnosis Date Noted    Severe aortic stenosis 07/18/2022     Priority: Medium    Chronic renal disease, stage III [facility]) [098270] 07/01/2022     Priority: Medium    Acute decompensated heart failure ([facility] 05/24/2022     Priority: Medium    Acute combined systolic and diastolic congestive heart failure ([facility] [address]      Priority: Medium    Mild intermittent asthma without complication 67/75/1618    Pure hypercholesterolemia 07/08/2019    Tobacco abuse 07/08/2019    Hypertension      Past Medical History:    HTN, currently uncontrolled  CKD--baseline 1.2-1.3--today 1.4  Asthma  HFpEF with VHD:  TTE 8/11/2021: Moderate concentric LVH. Ejection fraction is visually estimated at 50 to 55%. Normal right ventricular size and function. Mild thickening of the mitral valve leaflets. Mild mitral regurgitation is present. Severe calcification of the aortic valve . Moderate aortic regurgitation is noted. There severe aortic stenosis . The aortic valve area is 0.6cm2 and Dimensionless index of 0.18 to 0.23 consistent with severe to critical aortic stenosis  TTE 6/26/2018: Left ventricular size is grossly normal. Moderate left ventricular concentric hypertrophy noted. Ejection fraction is visually estimated at 50%. Severe aortic stenosis is present. The aortic valve area is 0.76 cm2 with a maximum gradient of 55 mmHg and a mean gradient of 29 mmHg. Mild aortic regurgitation is noted.       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 30 tablet 3    metoprolol succinate (TOPROL XL) 100 MG extended release tablet Take 1 tablet by mouth in the morning and at bedtime 60 tablet 3    spironolactone (ALDACTONE) 25 MG tablet TAKE ONE (1) TABLET DAILY. 30 tablet 3    ASPIRIN LOW DOSE 81 MG EC tablet take 1 tablet by mouth every day 30 tablet 5    allopurinol (ZYLOPRIM) 100 MG tablet TAKE 1 TABLET TWO (2) TIMES A DAY. 60 tablet 1    fluticasone (FLONASE) 50 MCG/ACT nasal spray 2 SPRAYS BY NASAL ROUTE DAILY 16 g 5    sacubitril-valsartan (ENTRESTO)  MG per tablet Take 1 tablet by mouth in the morning and 1 tablet before bedtime. 60 tablet 3    VENTOLIN  (90 Base) MCG/ACT inhaler Inhale 2 puffs into the lungs every 4 hours as needed for Wheezing 18 g 5    atorvastatin (LIPITOR) 40 MG tablet Take 40 mg by mouth at bedtime      loratadine (CLARITIN) 10 MG tablet Take 10 mg by mouth daily             Guideline directed medical/device therapy:  ARNI/ACE I/ARB: Yes  Beta blocker:   Yes  Aldosterone antagonist:  Yes  ICD/CRT-P/-D:   None   QRS interval on recent ECG (personally reviewed/interpreted): <120 ms  Percentage RV pacing (personally reviewed/interpreted): %/NA      Review of Systems:   Cardiac: As per HPI  General: No fever, chills, rigors  Pulmonary: As per HPI  HEENT: No visual disturbances, difficult swallowing  GI: No nausea, vomiting, abdominal pain  : No dysuria or hematuria  Endocrine: No thyroid disease or diabetes  Musculoskeletal: KIM x 4, no focal motor deficits  Skin: Intact, no rashes  Neuro/Psych: No headache or seizures        Weights: Wt Readings from Last 3 Encounters:   11/10/22 136 lb (61.7 kg)   11/08/22 135 lb (61.2 kg)   08/25/22 131 lb (59.4 kg)         Physical Examination:     BP (!) 116/58   Pulse 79   Resp 18   Ht 5' 5\" (1.651 m)   Wt 136 lb (61.7 kg)   BMI 22.63 kg/m²     CONSTITUTIONAL: Alert and oriented times 3, no acute distress and cooperative to examination with proper mood and affect. SKIN: Skin color, texture, turgor normal. No rashes or lesions. LYMPH: no cervical nodes, no inguinal nodes  HEENT: Head is normocephalic, atraumatic. EOMI, PERRLA. JVP elevated at 11 cm  NECK: Supple, symmetrical, trachea midline, no adenopathy, thyroid symmetric, not enlarged and no tenderness, skin normal.  CHEST/LUNGS: chest symmetric with normal A/P diameter, normal respiratory rate and rhythm, lungs coarse throughout. No accessory muscle use. Scars None   CARDIOVASCULAR: Heart sounds are normal.  Regular rate and rhythm with III/VI systolic murmur, no gallop or rub. Normal S1 and S2. Carotid and femoral pulses 2+/4 and equal bilaterally. ABDOMEN: Normal shape. No and Laparoscopic scar(s) present. Normal bowel sounds. No bruits. soft, nondistended, no masses or organomegaly. no evidence of hernia. Percussion: Normal without hepatosplenomegally. Tenderness: absent. RECTAL: deferred, not clinically indicated  NEUROLOGIC: There are no focalizing motor or sensory deficits. CN II-XII are grossly intact. Kenroy Ogren EXTREMITIES: no cyanosis, no clubbing. 1+ bilateral lower extremity edema. Warm and well perfused. All the following diagnostics were personally reviewed and interpreted by me.        LAB DATA:     5/25/2022 04:44 5/26/2022 05:05 6/13/2022 13:00   Sodium 141 139 140   Potassium 3.3 (L) 3.7 4.2   Chloride 105 103 108 (H)   CO2 23 27 22   BUN,BUNPL 16 14 25 (H)   Creatinine 1.5 (H) 1.4 (H) 1.5 (H)   Anion Gap 13 9 10   GFR Non- 42 46 42   GFR African American 42 46 42   Magnesium 1.5 (L) 1.8    GLUCOSE, FASTING,GF 79 86 87   CALCIUM, SERUM, 306749 8.3 (L) 8.1 (L) 8.7   Total Protein  5.3 (L)    Pro-BNP   28,263 (H)       IMAGING:    CXR (5/24/2022)  FINDINGS:  Left heart appears prominent, slight pulmonary venous hypertension. No  evidence of pulmonary edema. Right convexity thoracic scoliosis unchanged. Lungs are clear. No pleural fluid or focal pneumonia. Impression  1. Prominent left heart and pulmonary venous hypertension. Findings suggest  minimal CHF/volume overload. 2.  No evidence of alveolar consolidation. CARDIAC TESTING:    TTE (6/26/2018)   Summary   Left ventricular size is grossly normal.   Moderate left ventricular concentric hypertrophy noted. Ejection fraction is visually estimated at 50%. Severe aortic stenosis is present. The aortic valve area is 0.76 cm2 with a maximum gradient of 55 mmHg and a   mean gradient of 29 mmHg. Mild aortic regurgitation is noted. TTE (8/11/2021)   Summary   Moderate concentric LVH   Ejection fraction is visually estimated at 50 to 55%. Normal right ventricular size and function. Mild thickening of the mitral valve leaflets. Mild mitral regurgitation is present. Severe calcification of the aortic valve . Moderate aortic regurgitation is noted. There severe aortic stenosis . The aortic valve area is 0.6cm2 and   Dimensionless index of 0.18 to 0.23 consistent with severe to critical   aortic stenosis    TTE (5/24/2022)   Summary   Ejection fraction is visually estimated at 30%. Overall ejection fraction severely decreased. Severe left ventricular concentric hypertrophy noted. There is doppler evidence of stage III diastolic dysfunction. Dilated right ventricle with reduced function. Left atrial volume index of 35 ml per meters squared BSA. Severe aortic stenosis is present.    The aortic valve area is 0.7 cm2 with a maximum gradient of 77 mmHg and a   mean gradient of 40 mmHg. Moderate-to-severe aortic regurgitation is noted. Aortic valve pressure   half-time 271 ms. Moderate mitral regurgitation is present. Mild tricuspid regurgitation. RVSP is 37 mmHg. No evidence for hemodynamically significant pericardial effusion. Cardiac catheterization from 7/18/2022: LAD 20% eccentric calcified mid vessel disease. Left main no significant disease. Left circumflex no significant disease. RCA eccentric calcified proximal vessel disease of 50%. Sequential 440 and 30% mid vessel narrowings. To ED from 7/18/2022: EF is 35 to 40%. Mildly reduced RV function. Severe aortic stenosis with a valve area 0.8 cm² by direct planimetry. Mean gradient 37 mmHg. DVI is 0.24. Moderate aortic regurgitation. Pressure half-time of 449 ms    EKG  NSR  Left ventricular hypertrophy with strain patter  Poor R wave progression - non-specific      ASSESSMENT:  Acute on chronic HFrEF  ACC stage C / NYHA class III  Hypervolemic  Cardiomyopathy, ischemia evaluation pending  LVEF 30%, LVEDD 5.0, LVMI 233  Severe AS, moderate-severe AI, moderate MR - Following with valve clinic, MICHAEL evaluation pending  HTN  CKD  Asthma   Ongoing tobacco abuse       PLAN:  I discussed various management options for her aortic stenosis including conservative treatment versus surgical intervention with AVR. I explained to her that without intervention, the prognosis will remain guarded. While she is reluctant about proceeding with surgery, she is at least agreeable to seeing cardiothoracic surgery and have a discussion. I will refer her for the same. Even with systolic dysfunction her outcome would be better with aortic valve replacement compared to conservative therapy. She is currently on guideline directed medical therapy in the form of Toprol- twice daily, Entresto  twice daily, Aldactone 25 mg daily.   She is supposed to be on Jardiance but was not aware of it. I will start Jardiance 10 mg daily today. She appears hypervolemic on examination today. She is on Bumex 1 mg daily. I will have her take an extra milligram for the next 2 to 3 days. She will continue with Lipitor 40 mg daily. She has mild to moderate coronary artery disease and does not need any further ischemic work-up at this time.     She will follow-up with me in 3 months

## 2022-11-10 NOTE — TELEPHONE ENCOUNTER
Spoke with patient regarding above. She will  Jardiance 10 mg samples while at Dr. Samantha Chavez apt 11/10.     Lot #: [id number]  Exp: 5/24  2 Boxes of 7 tabs (14)

## 2022-11-10 NOTE — TELEPHONE ENCOUNTER
Patient states that she went to the pharmacy to  prescriptions. But states that she can't take the extra water pill that you wanted her to because she is on two water pills already and that her pills come in a punch pack and she would need a script to take the extra one. States she already has a hard time \"holding her water\" and wants to know what you want her to do.

## 2022-11-14 ENCOUNTER — TELEPHONE (OUTPATIENT)
Dept: CARDIOLOGY CLINIC | Age: 67
End: 2022-11-14

## 2022-11-14 NOTE — PROGRESS NOTES
Education:      Mailed lab scripts and 4 page Jardiance medication guide for oral use. Copyright © 2022 [address] [place] [address]  [id number]

## 2022-11-14 NOTE — TELEPHONE ENCOUNTER
Saw Dr Matthew Hoover 11/10/22. Picked up Jardiance samples 10mg daily. Her insurance is paying for Jardiance. No need for Rx assistance. Next pill packing they will put it in for her. They have her a bottle this time. She also has samples. She will get labs as directed by Dr Matthew Hoover. Denies any adverse effects to Jardiance.      Future Appointments   Date Time Provider Chin Wesley   11/16/2022  9:30 AM [facility] MOBILE [facility] RM 1 SEYZ [facility]   11/17/2022  2:00 PM Redd Wilcox MD CARDIO SURG [facility]   1/10/2023 10:00 AM [facility] CHF ROOM 1 [facility]   5/3/2023 10:30 AM Lindle Savory Char Paget, PA-C TRAIL PC [facility]

## 2022-11-15 ENCOUNTER — TELEPHONE (OUTPATIENT)
Dept: OTHER | Age: 67
End: 2022-11-15

## 2022-11-15 NOTE — TELEPHONE ENCOUNTER
CHF CHW Initial Call  11/15/2022  9:47 AM    CHW received referral from Martine Zuniga RN. Patient need: Prescription assistance/prescription drug coverage. Notes: CHW called patient to assist with prescription drug coverage. Patient stated that she does have prescription drug coverage and she has never had to pay out of pocket for medications. Patient did not answer questions regarding insurance. Patient denied prescription assistance contact information for future needs. CHW services were declined. CHW informed patient of the services and resources that can be provided to them. CHW instructed patient to call CHW for any future assistance. Patient in agreement with plan and further assistance.   [] Agreed    [x] Declined      Electronically signed by Romana Gilles on 11/15/2022 at 9:50 AM

## 2022-11-16 ENCOUNTER — TELEPHONE (OUTPATIENT)
Dept: CARDIOLOGY CLINIC | Age: 67
End: 2022-11-16

## 2022-11-16 RX ORDER — ATORVASTATIN CALCIUM 40 MG/1
TABLET, FILM COATED ORAL
Qty: 30 TABLET | Refills: 5 | Status: SHIPPED | OUTPATIENT
Start: 2022-11-16

## 2022-11-16 RX ORDER — ALLOPURINOL 100 MG/1
TABLET ORAL
Qty: 60 TABLET | Refills: 1 | Status: SHIPPED | OUTPATIENT
Start: 2022-11-16

## 2022-11-16 NOTE — TELEPHONE ENCOUNTER
Called patient to confirm appointment for 11/17 with Dr Olinda Paredes.  Patient confirmed appointment date and time

## 2022-11-17 ENCOUNTER — HOSPITAL ENCOUNTER (OUTPATIENT)
Age: 67
Discharge: HOME | End: 2022-11-17
Payer: MEDICARE

## 2022-11-17 ENCOUNTER — OFFICE VISIT (OUTPATIENT)
Dept: CARDIOTHORACIC SURGERY | Age: 67
End: 2022-11-17
Payer: MEDICARE

## 2022-11-17 VITALS
BODY MASS INDEX: 21.83 KG/M2 | HEIGHT: 65 IN | DIASTOLIC BLOOD PRESSURE: 51 MMHG | SYSTOLIC BLOOD PRESSURE: 139 MMHG | RESPIRATION RATE: 20 BRPM | WEIGHT: 131 LBS | HEART RATE: 73 BPM

## 2022-11-17 DIAGNOSIS — I35.0 SEVERE AORTIC STENOSIS: Primary | ICD-10-CM

## 2022-11-17 DIAGNOSIS — I50.9 CONGESTIVE HEART FAILURE, UNSPECIFIED HF CHRONICITY, UNSPECIFIED HEART FAILURE TYPE (HCC): ICD-10-CM

## 2022-11-17 LAB
ANION GAP SERPL CALCULATED.3IONS-SCNC: 10 MMOL/L (ref 7–16)
BUN BLDV-MCNC: 26 MG/DL (ref 6–23)
CALCIUM SERPL-MCNC: 9.7 MG/DL (ref 8.6–10.2)
CHLORIDE BLD-SCNC: 103 MMOL/L (ref 98–107)
CO2: 26 MMOL/L (ref 22–29)
CREAT SERPL-MCNC: 1.6 MG/DL (ref 0.5–1)
GFR SERPL CREATININE-BSD FRML MDRD: 35 ML/MIN/1.73
GLUCOSE BLD-MCNC: 81 MG/DL (ref 74–99)
POTASSIUM SERPL-SCNC: 3.9 MMOL/L (ref 3.5–5)
PRO-BNP: 687 PG/ML (ref 0–125)
SODIUM BLD-SCNC: 139 MMOL/L (ref 132–146)

## 2022-11-17 PROCEDURE — 1123F ACP DISCUSS/DSCN MKR DOCD: CPT | Performed by: THORACIC SURGERY (CARDIOTHORACIC VASCULAR SURGERY)

## 2022-11-17 PROCEDURE — 80048 BASIC METABOLIC PNL TOTAL CA: CPT

## 2022-11-17 PROCEDURE — 3017F COLORECTAL CA SCREEN DOC REV: CPT | Performed by: THORACIC SURGERY (CARDIOTHORACIC VASCULAR SURGERY)

## 2022-11-17 PROCEDURE — G8400 PT W/DXA NO RESULTS DOC: HCPCS | Performed by: THORACIC SURGERY (CARDIOTHORACIC VASCULAR SURGERY)

## 2022-11-17 PROCEDURE — 3078F DIAST BP <80 MM HG: CPT | Performed by: THORACIC SURGERY (CARDIOTHORACIC VASCULAR SURGERY)

## 2022-11-17 PROCEDURE — 83880 ASSAY OF NATRIURETIC PEPTIDE: CPT

## 2022-11-17 PROCEDURE — G8420 CALC BMI NORM PARAMETERS: HCPCS | Performed by: THORACIC SURGERY (CARDIOTHORACIC VASCULAR SURGERY)

## 2022-11-17 PROCEDURE — G8484 FLU IMMUNIZE NO ADMIN: HCPCS | Performed by: THORACIC SURGERY (CARDIOTHORACIC VASCULAR SURGERY)

## 2022-11-17 PROCEDURE — 4004F PT TOBACCO SCREEN RCVD TLK: CPT | Performed by: THORACIC SURGERY (CARDIOTHORACIC VASCULAR SURGERY)

## 2022-11-17 PROCEDURE — G8427 DOCREV CUR MEDS BY ELIG CLIN: HCPCS | Performed by: THORACIC SURGERY (CARDIOTHORACIC VASCULAR SURGERY)

## 2022-11-17 PROCEDURE — 99213 OFFICE O/P EST LOW 20 MIN: CPT | Performed by: THORACIC SURGERY (CARDIOTHORACIC VASCULAR SURGERY)

## 2022-11-17 PROCEDURE — 36415 COLL VENOUS BLD VENIPUNCTURE: CPT

## 2022-11-17 PROCEDURE — 1090F PRES/ABSN URINE INCON ASSESS: CPT | Performed by: THORACIC SURGERY (CARDIOTHORACIC VASCULAR SURGERY)

## 2022-11-17 PROCEDURE — 3074F SYST BP LT 130 MM HG: CPT | Performed by: THORACIC SURGERY (CARDIOTHORACIC VASCULAR SURGERY)

## 2022-11-17 NOTE — PROGRESS NOTES
[facility]  Visit Note      Patient name: Elian Baptiste    Reason for consult: Severe AS     Primary Care Physician: Komal Paredes PA-C    Date of service: 11/17/2022    Chief Complaint:  Severe AS     HPI:  74y.o.-year-old female presenting to the [address] [place] [address] for continued evaluation of her AS. During her las visit it was thought that given her young age and low STS risk, he was better served with SAVR and surgery was recommended. She did begin the process of her workup for surgery however did not return. I last saw her in August at which time she was noted to have a new cardiomyopathy and she was once again strongly advised to undergo AVR and she declined. She returns today to discuss again. Allergies: Allergies   Allergen Reactions    Pcn [Penicillins]        Home medications:    Current Outpatient Medications   Medication Sig Dispense Refill    allopurinol (ZYLOPRIM) 100 MG tablet TAKE 1 TABLET TWO (2) TIMES A DAY. 60 tablet 1    atorvastatin (LIPITOR) 40 MG tablet TAKE ONE (1) TABLET DAILY. 30 tablet 5    empagliflozin (JARDIANCE) 10 MG tablet Take 1 tablet by mouth daily 60 tablet 1    fluticasone (FLOVENT HFA) 220 MCG/ACT inhaler Inhale 2 puffs into the lungs 2 times daily 1 each 3    bumetanide (BUMEX) 1 MG tablet TAKE ONE (1) TABLET DAILY. 30 tablet 3    metoprolol succinate (TOPROL XL) 100 MG extended release tablet Take 1 tablet by mouth in the morning and at bedtime 60 tablet 3    spironolactone (ALDACTONE) 25 MG tablet TAKE ONE (1) TABLET DAILY. 30 tablet 3    ASPIRIN LOW DOSE 81 MG EC tablet take 1 tablet by mouth every day 30 tablet 5    fluticasone (FLONASE) 50 MCG/ACT nasal spray 2 SPRAYS BY NASAL ROUTE DAILY 16 g 5    sacubitril-valsartan (ENTRESTO)  MG per tablet Take 1 tablet by mouth in the morning and 1 tablet before bedtime.  60 tablet 3    VENTOLIN  (90 Base) MCG/ACT inhaler Inhale 2 puffs into the lungs every 4 hours as needed for Wheezing 18 g 5    mineral oil-hydrophilic petrolatum (AQUAPHOR) ointment Apply topically as needed. 99 g 0    loratadine (CLARITIN) 10 MG tablet Take 10 mg by mouth daily       No current facility-administered medications for this visit.        Past Medical History:  Past Medical History:   Diagnosis Date    Asthma     CHF (congestive heart failure) (HCC)     CKD (chronic kidney disease)     HLD (hyperlipidemia)     Hypertension     Valvular heart disease        Past Surgical History:  Past Surgical History:   Procedure Laterality Date    KIDNEY SURGERY      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    TONSILLECTOMY      TUBAL LIGATION         Social History:  Social History     Socioeconomic History    Marital status:      Spouse name: Not on file    Number of children: Not on file    Years of education: Not on file    Highest education level: Not on file   Occupational History    Not on file   Tobacco Use    Smoking status: Every Day     Packs/day: 1.00     Years: 23.00     Pack years: 23.00     Types: Cigarettes     Start date: 7/8/1999    Smokeless tobacco: Never   Vaping Use    Vaping Use: Former    Substances: Flavoring   Substance and Sexual Activity    Alcohol use: Not Currently    Drug use: Never    Sexual activity: Yes     Partners: Male   Other Topics Concern    Not on file   Social History Narrative    Drinks 2-3 glasses iced tea     Social Determinants of Health     Financial Resource Strain: Low Risk     Difficulty of Paying Living Expenses: Not hard at all   Food Insecurity: No Food Insecurity    Worried About Running Out of Food in the Last Year: Never true    Ran Out of Food in the Last Year: Never true   Transportation Needs: Not on file   Physical Activity: Inactive    Days of Exercise per Week: 0 days    Minutes of Exercise per Session: 0 min   Stress: Not on file   Social Connections: Not on file   Intimate Partner Violence: Not on file   Housing Stability: Not on file Family History:  Family History   Problem Relation Age of Onset    Stroke Mother     Diabetes Mother     High Blood Pressure Mother     Heart Surgery Father 67    Hypertension Father        Review of Systems:  Constitutional: Denies fevers, chills, or weight loss. HEENT: Denies visual changes or hearing loss. Heart: As per HPI. Lungs: Denies shortness of breath, cough, or wheezing. Gastrointestinal: Denies nausea, vomiting, constipation, or diarrhea. Genitourinary: dysuria or hematuria. Psychiatric: Patient denies anxiety or depression. Neurologic: Patient denies weakness of the extremities, dizziness, or headaches. All other ROS checked and found to be negative. Objective:  Vitals BP (!) 139/51 (Site: Left Upper Arm, Position: Sitting)   Pulse 73   Resp 20   Ht 5' 5\" (1.651 m)   Wt 131 lb (59.4 kg)   BMI 21.80 kg/m²   General Appearance: Pleasant 74y.o. year old female who appears stated age. Communicates well, no acute distress. HEENT: Head is normocephalic, atraumatic. EOMs intact, PERRL. Trachea midline. Lungs: Normal respiratory rate and normal effort. She is not in respiratory distress. Breath sounds clear to auscultation. No wheezes. Heart: Normal rate. Regular rhythm. S1 normal and S2 normal. Positive for murmur. Chest: Symmetric chest wall expansion. Extremities: Normal range of motion. Neurological: Patient is alert and oriented to person, place and time. Patient has normal reflexes. Skin: Warm and dry. Abdomen: Abdomen is soft and non-distended. Bowel sounds are normal. There is no abdominal tenderness tenderness. There is no guarding. There is no mass. Pulses: Distal pulses are intact. Skin: Warm and dry without lesions.         Assessment/Plan  Severe AS/AI  EF35%     Unfortunately she has cardiomyopathy with an EF of 35% related to her VHD  Surgery was once again recommended, natural history of AS was explained including ultimately heart failure and death if surgery is avoided  Despite this and our 3rd conversation about replacing the valve, she is not agreeable to surgery   She will return if she changes her mind      Electronically signed by Dennie Hof, DO on 11/17/2022 at 1:21 PM

## 2022-11-18 NOTE — RESULT ENCOUNTER NOTE
Labs reviewed, saw Dr. Tessa Akers was hypervolemic and took 2-3 days of extra bumex  Alexandreargenis Canas was also added which can transiently result rise in bun/scr initially   Please have her recheck labs in 2 weeks     Thank you

## 2022-11-21 ENCOUNTER — TELEPHONE (OUTPATIENT)
Dept: CARDIOLOGY CLINIC | Age: 67
End: 2022-11-21

## 2022-11-21 DIAGNOSIS — I50.9 CONGESTIVE HEART FAILURE, UNSPECIFIED HF CHRONICITY, UNSPECIFIED HEART FAILURE TYPE (HCC): Primary | ICD-10-CM

## 2022-11-21 NOTE — TELEPHONE ENCOUNTER
Left provider instructions in   Requested office call back to confirm understanding    Also mailed lab scripts with approximate due date written on it.

## 2022-11-21 NOTE — TELEPHONE ENCOUNTER
----- Message from AILEEN Caballero - CNP sent at 11/17/2022 10:33 PM EST -----  Labs reviewed, saw Dr. Magan Felix was hypervolemic and took 2-3 days of extra bumex  Cronin Cleaves was also added which can transiently result rise in bun/scr initially   Please have her recheck labs in 2 weeks     Thank you

## 2022-12-01 ENCOUNTER — HOSPITAL ENCOUNTER (OUTPATIENT)
Age: 67
Discharge: HOME | End: 2022-12-01
Payer: MEDICARE

## 2022-12-01 ENCOUNTER — TELEPHONE (OUTPATIENT)
Dept: CARDIOLOGY CLINIC | Age: 67
End: 2022-12-01

## 2022-12-01 DIAGNOSIS — I50.9 CONGESTIVE HEART FAILURE, UNSPECIFIED HF CHRONICITY, UNSPECIFIED HEART FAILURE TYPE (HCC): ICD-10-CM

## 2022-12-01 LAB
ANION GAP SERPL CALCULATED.3IONS-SCNC: 9 MMOL/L (ref 7–16)
BUN BLDV-MCNC: 19 MG/DL (ref 6–23)
CALCIUM SERPL-MCNC: 9.6 MG/DL (ref 8.6–10.2)
CHLORIDE BLD-SCNC: 104 MMOL/L (ref 98–107)
CO2: 29 MMOL/L (ref 22–29)
CREAT SERPL-MCNC: 1.5 MG/DL (ref 0.5–1)
GFR SERPL CREATININE-BSD FRML MDRD: 38 ML/MIN/1.73
GLUCOSE BLD-MCNC: 71 MG/DL (ref 74–99)
POTASSIUM SERPL-SCNC: 3.8 MMOL/L (ref 3.5–5)
PRO-BNP: 1106 PG/ML (ref 0–125)
SODIUM BLD-SCNC: 142 MMOL/L (ref 132–146)

## 2022-12-01 PROCEDURE — 80048 BASIC METABOLIC PNL TOTAL CA: CPT

## 2022-12-01 PROCEDURE — 36415 COLL VENOUS BLD VENIPUNCTURE: CPT

## 2022-12-01 PROCEDURE — 83880 ASSAY OF NATRIURETIC PEPTIDE: CPT

## 2022-12-01 NOTE — TELEPHONE ENCOUNTER
[address] [facility] instructs patient to call if needs visit sooner each visit. [address] with MALDONADO curry cnp instructions. Verbalized understanding.

## 2022-12-01 NOTE — RESULT ENCOUNTER NOTE
Labs reviewed    Current GDMT:  Entresto 97/103 mg BID  Toprol 100 mg TID  Spironolactone 25 mg daily   Jardiance 10 mg daily   Bumex 1 mg daily     On goal GDMT  Remind her to use PRN dose of bumex if needed  Follow up with CHF clinic as scheduled - sooner if needed

## 2022-12-01 NOTE — TELEPHONE ENCOUNTER
----- Message from AILEEN Solano CNP sent at 12/1/2022  3:15 PM EST -----  Labs reviewed    Current GDMT:  Entresto 97/103 mg BID  Toprol 100 mg TID  Spironolactone 25 mg daily   Jardiance 10 mg daily   Bumex 1 mg daily     On goal GDMT  Remind her to use PRN dose of bumex if needed  Follow up with CHF clinic as scheduled - sooner if needed

## 2022-12-11 ENCOUNTER — APPOINTMENT (OUTPATIENT)
Dept: GENERAL RADIOLOGY | Age: 67
End: 2022-12-11
Payer: MEDICARE

## 2022-12-11 ENCOUNTER — APPOINTMENT (OUTPATIENT)
Dept: CT IMAGING | Age: 67
End: 2022-12-11
Payer: MEDICARE

## 2022-12-11 ENCOUNTER — HOSPITAL ENCOUNTER (EMERGENCY)
Age: 67
Discharge: HOME | End: 2022-12-12
Attending: EMERGENCY MEDICINE
Payer: MEDICARE

## 2022-12-11 DIAGNOSIS — R10.9 FLANK PAIN: Primary | ICD-10-CM

## 2022-12-11 DIAGNOSIS — R79.89 ELEVATED SERUM CREATININE: ICD-10-CM

## 2022-12-11 DIAGNOSIS — J10.1 INFLUENZA A: ICD-10-CM

## 2022-12-11 DIAGNOSIS — E87.20 LACTIC ACIDOSIS: ICD-10-CM

## 2022-12-11 LAB
ALBUMIN SERPL-MCNC: 3.8 G/DL (ref 3.5–5.2)
ALP BLD-CCNC: 73 U/L (ref 35–104)
ALT SERPL-CCNC: 23 U/L (ref 0–32)
ANION GAP SERPL CALCULATED.3IONS-SCNC: 17 MMOL/L (ref 7–16)
AST SERPL-CCNC: 26 U/L (ref 0–31)
BACTERIA: ABNORMAL /HPF
BASOPHILS ABSOLUTE: 0.03 E9/L (ref 0–0.2)
BASOPHILS RELATIVE PERCENT: 0.4 % (ref 0–2)
BILIRUB SERPL-MCNC: 0.6 MG/DL (ref 0–1.2)
BILIRUBIN URINE: ABNORMAL
BLOOD, URINE: ABNORMAL
BUN BLDV-MCNC: 43 MG/DL (ref 6–23)
CALCIUM SERPL-MCNC: 10.2 MG/DL (ref 8.6–10.2)
CHLORIDE BLD-SCNC: 99 MMOL/L (ref 98–107)
CLARITY: CLEAR
CO2: 17 MMOL/L (ref 22–29)
COLOR: YELLOW
CREAT SERPL-MCNC: 1.9 MG/DL (ref 0.5–1)
EOSINOPHILS ABSOLUTE: 0 E9/L (ref 0.05–0.5)
EOSINOPHILS RELATIVE PERCENT: 0 % (ref 0–6)
GFR SERPL CREATININE-BSD FRML MDRD: 29 ML/MIN/1.73
GLUCOSE BLD-MCNC: 94 MG/DL (ref 74–99)
GLUCOSE URINE: NEGATIVE MG/DL
HCT VFR BLD CALC: 43.4 % (ref 34–48)
HEMOGLOBIN: 14.4 G/DL (ref 11.5–15.5)
IMMATURE GRANULOCYTES #: 0.02 E9/L
IMMATURE GRANULOCYTES %: 0.2 % (ref 0–5)
KETONES, URINE: ABNORMAL MG/DL
LACTIC ACID: 3.5 MMOL/L (ref 0.5–2.2)
LEUKOCYTE ESTERASE, URINE: NEGATIVE
LIPASE: 46 U/L (ref 13–60)
LYMPHOCYTES ABSOLUTE: 1.91 E9/L (ref 1.5–4)
LYMPHOCYTES RELATIVE PERCENT: 22.8 % (ref 20–42)
MCH RBC QN AUTO: 34 PG (ref 26–35)
MCHC RBC AUTO-ENTMCNC: 33.2 % (ref 32–34.5)
MCV RBC AUTO: 102.4 FL (ref 80–99.9)
MONOCYTES ABSOLUTE: 1.24 E9/L (ref 0.1–0.95)
MONOCYTES RELATIVE PERCENT: 14.8 % (ref 2–12)
NEUTROPHILS ABSOLUTE: 5.19 E9/L (ref 1.8–7.3)
NEUTROPHILS RELATIVE PERCENT: 61.8 % (ref 43–80)
NITRITE, URINE: NEGATIVE
PDW BLD-RTO: 13.1 FL (ref 11.5–15)
PH UA: 5.5 (ref 5–9)
PLATELET # BLD: 158 E9/L (ref 130–450)
PMV BLD AUTO: 12 FL (ref 7–12)
POTASSIUM SERPL-SCNC: 4.5 MMOL/L (ref 3.5–5)
PROTEIN UA: 100 MG/DL
RBC # BLD: 4.24 E12/L (ref 3.5–5.5)
RBC UA: ABNORMAL /HPF (ref 0–2)
SODIUM BLD-SCNC: 133 MMOL/L (ref 132–146)
SPECIFIC GRAVITY UA: 1.02 (ref 1–1.03)
TOTAL PROTEIN: 8.1 G/DL (ref 6.4–8.3)
UROBILINOGEN, URINE: 0.2 E.U./DL
WBC # BLD: 8.4 E9/L (ref 4.5–11.5)
WBC UA: ABNORMAL /HPF (ref 0–5)

## 2022-12-11 PROCEDURE — 99285 EMERGENCY DEPT VISIT HI MDM: CPT

## 2022-12-11 PROCEDURE — 83690 ASSAY OF LIPASE: CPT

## 2022-12-11 PROCEDURE — 71045 X-RAY EXAM CHEST 1 VIEW: CPT

## 2022-12-11 PROCEDURE — 80053 COMPREHEN METABOLIC PANEL: CPT

## 2022-12-11 PROCEDURE — 81001 URINALYSIS AUTO W/SCOPE: CPT

## 2022-12-11 PROCEDURE — 74176 CT ABD & PELVIS W/O CONTRAST: CPT

## 2022-12-11 PROCEDURE — 2580000003 HC RX 258

## 2022-12-11 PROCEDURE — 96374 THER/PROPH/DIAG INJ IV PUSH: CPT

## 2022-12-11 PROCEDURE — 71045 X-RAY EXAM CHEST 1 VIEW: CPT | Performed by: RADIOLOGY

## 2022-12-11 PROCEDURE — 6360000002 HC RX W HCPCS

## 2022-12-11 PROCEDURE — 83605 ASSAY OF LACTIC ACID: CPT

## 2022-12-11 PROCEDURE — 96375 TX/PRO/DX INJ NEW DRUG ADDON: CPT

## 2022-12-11 PROCEDURE — 85025 COMPLETE CBC W/AUTO DIFF WBC: CPT

## 2022-12-11 PROCEDURE — 87635 SARS-COV-2 COVID-19 AMP PRB: CPT

## 2022-12-11 PROCEDURE — 87502 INFLUENZA DNA AMP PROBE: CPT

## 2022-12-11 RX ORDER — ONDANSETRON 2 MG/ML
4 INJECTION INTRAMUSCULAR; INTRAVENOUS ONCE
Status: COMPLETED | OUTPATIENT
Start: 2022-12-11 | End: 2022-12-11

## 2022-12-11 RX ORDER — 0.9 % SODIUM CHLORIDE 0.9 %
500 INTRAVENOUS SOLUTION INTRAVENOUS ONCE
Status: COMPLETED | OUTPATIENT
Start: 2022-12-11 | End: 2022-12-12

## 2022-12-11 RX ORDER — 0.9 % SODIUM CHLORIDE 0.9 %
1000 INTRAVENOUS SOLUTION INTRAVENOUS ONCE
Status: DISCONTINUED | OUTPATIENT
Start: 2022-12-11 | End: 2022-12-11

## 2022-12-11 RX ORDER — FENTANYL CITRATE 50 UG/ML
25 INJECTION, SOLUTION INTRAMUSCULAR; INTRAVENOUS ONCE
Status: COMPLETED | OUTPATIENT
Start: 2022-12-11 | End: 2022-12-11

## 2022-12-11 RX ADMIN — SODIUM CHLORIDE 500 ML: 9 INJECTION, SOLUTION INTRAVENOUS at 23:49

## 2022-12-11 RX ADMIN — ONDANSETRON 4 MG: 2 INJECTION INTRAMUSCULAR; INTRAVENOUS at 23:50

## 2022-12-11 RX ADMIN — FENTANYL CITRATE 25 MCG: 50 INJECTION, SOLUTION INTRAMUSCULAR; INTRAVENOUS at 23:51

## 2022-12-11 ASSESSMENT — PAIN DESCRIPTION - LOCATION
LOCATION: FLANK
LOCATION: BACK

## 2022-12-11 ASSESSMENT — PAIN SCALES - GENERAL
PAINLEVEL_OUTOF10: 10
PAINLEVEL_OUTOF10: 5

## 2022-12-11 ASSESSMENT — PAIN DESCRIPTION - ORIENTATION
ORIENTATION: RIGHT
ORIENTATION: RIGHT

## 2022-12-11 ASSESSMENT — PAIN - FUNCTIONAL ASSESSMENT: PAIN_FUNCTIONAL_ASSESSMENT: 0-10

## 2022-12-11 ASSESSMENT — PAIN DESCRIPTION - DESCRIPTORS: DESCRIPTORS: ACHING

## 2022-12-12 VITALS
OXYGEN SATURATION: 97 % | TEMPERATURE: 97.9 F | WEIGHT: 131 LBS | BODY MASS INDEX: 21.83 KG/M2 | DIASTOLIC BLOOD PRESSURE: 76 MMHG | SYSTOLIC BLOOD PRESSURE: 146 MMHG | RESPIRATION RATE: 20 BRPM | HEART RATE: 85 BPM | HEIGHT: 65 IN

## 2022-12-12 LAB
EKG ATRIAL RATE: 90 BPM
EKG P AXIS: 70 DEGREES
EKG P-R INTERVAL: 146 MS
EKG Q-T INTERVAL: 372 MS
EKG QRS DURATION: 86 MS
EKG QTC CALCULATION (BAZETT): 455 MS
EKG R AXIS: 52 DEGREES
EKG T AXIS: -160 DEGREES
EKG VENTRICULAR RATE: 90 BPM
INFLUENZA A BY PCR: DETECTED
INFLUENZA B BY PCR: NOT DETECTED
LACTIC ACID: 2 MMOL/L (ref 0.5–2.2)
SARS-COV-2, NAAT: NOT DETECTED

## 2022-12-12 PROCEDURE — 93005 ELECTROCARDIOGRAM TRACING: CPT

## 2022-12-12 PROCEDURE — 93010 ELECTROCARDIOGRAM REPORT: CPT | Performed by: INTERNAL MEDICINE

## 2022-12-12 PROCEDURE — 36415 COLL VENOUS BLD VENIPUNCTURE: CPT

## 2022-12-12 PROCEDURE — 83605 ASSAY OF LACTIC ACID: CPT

## 2022-12-12 RX ORDER — ONDANSETRON 4 MG/1
4 TABLET, FILM COATED ORAL 3 TIMES DAILY PRN
Qty: 15 TABLET | Refills: 0 | Status: SHIPPED | OUTPATIENT
Start: 2022-12-12

## 2022-12-12 RX ORDER — OSELTAMIVIR PHOSPHATE 30 MG/1
30 CAPSULE ORAL DAILY
Qty: 5 CAPSULE | Refills: 0 | Status: SHIPPED | OUTPATIENT
Start: 2022-12-12 | End: 2022-12-17

## 2022-12-12 ASSESSMENT — ENCOUNTER SYMPTOMS
WHEEZING: 0
NAUSEA: 0
SHORTNESS OF BREATH: 0
BACK PAIN: 0
ABDOMINAL PAIN: 0
TROUBLE SWALLOWING: 0
PHOTOPHOBIA: 0
COUGH: 1
RHINORRHEA: 0
VOMITING: 0
DIARRHEA: 1
VOICE CHANGE: 0

## 2022-12-12 NOTE — ED PROVIDER NOTES
74y.o. year old female presenting to the emergency room with concerns of flank pain. Reports that symptom's onset 4 days ago. Worsen with not eating or drinking. Improves with none. Severity of 5, with radiation along right side. Symptoms are intermittent in timing. Symptoms described as pressure. associated symptoms of diarrhea. Patient in no acute distress. Patient with previous history of. CKD stage III. Not on dialysis. Reports cough. Chief Complaint   Patient presents with    Flank Pain     Right flank pain x 4 days. Denies fall or injury. Denies urinary symptoms. Denies fever/chills. +dizziness. +diarrhea. Review of Systems   Constitutional:  Negative for chills, fatigue and fever. HENT:  Negative for congestion, rhinorrhea, trouble swallowing and voice change. Eyes:  Negative for photophobia and visual disturbance. Respiratory:  Positive for cough. Negative for shortness of breath and wheezing. Cardiovascular:  Negative for chest pain and palpitations. Gastrointestinal:  Positive for diarrhea. Negative for abdominal pain, nausea and vomiting. Genitourinary:  Positive for flank pain. Negative for dysuria, hematuria and urgency. Musculoskeletal:  Positive for myalgias. Negative for arthralgias, back pain, neck pain and neck stiffness. Skin:  Negative for rash and wound. Neurological:  Negative for dizziness, syncope, weakness, numbness and headaches. Psychiatric/Behavioral:  Negative for behavioral problems and confusion. The patient is nervous/anxious. Physical Exam  Vitals reviewed. Constitutional:       General: She is not in acute distress. Appearance: Normal appearance. She is not ill-appearing. HENT:      Head: Normocephalic. Right Ear: External ear normal.      Left Ear: External ear normal.      Nose: Nose normal.      Mouth/Throat:      Mouth: Mucous membranes are dry. Pharynx: Oropharynx is clear. Eyes:      General: No scleral icterus. Right eye: No discharge. Left eye: No discharge. Conjunctiva/sclera: Conjunctivae normal.   Cardiovascular:      Rate and Rhythm: Regular rhythm. Tachycardia present. Heart sounds: No friction rub. Pulmonary:      Effort: Pulmonary effort is normal. No respiratory distress. Breath sounds: No stridor. No rhonchi. Abdominal:      General: There is no distension. Palpations: Abdomen is soft. Tenderness: There is no abdominal tenderness. There is right CVA tenderness. There is no left CVA tenderness, guarding or rebound. Musculoskeletal:         General: No tenderness or deformity. Cervical back: Normal range of motion and neck supple. No rigidity or tenderness. Right lower leg: No edema. Left lower leg: No edema. Skin:     General: Skin is warm. Coloration: Skin is not jaundiced. Findings: No erythema. Neurological:      Mental Status: She is alert and oriented to person, place, and time. Sensory: No sensory deficit. Motor: No weakness. Psychiatric:         Mood and Affect: Mood is anxious. Behavior: Behavior normal.        Procedures     CT ABDOMEN PELVIS WO CONTRAST Additional Contrast? None   Final Result   No acute intraabdominal or intrapelvic pathology. XR CHEST 1 VIEW   Final Result   No significant congestive heart failure. EKG:  This EKG is signed by emergency department physician. Rate: 95  Rhythm: Sinus  Interpretation: Sinus rhythm with T wave inversions noted V4 through V6, 1 and 2. Comparison: stable as compared to patient's most recent EKG     MDM  Number of Diagnoses or Management Options  Elevated serum creatinine  Flank pain  Influenza A  Lactic acidosis  Diagnosis management comments: 79-year-old female presenting to emergency department complaints of right flank pain x4 days. Patient with associated diarrhea. Patient reports able to urinate today. Previous history of CKD. Discussed patient's creatinine clearance and through shared decision-making patient would prefer to follow-up with her PCP and have a repeat BMP tomorrow. Stressed the importance of patient returning if any new or worsening symptoms develop. Patient reports that she is feeling greatly improved and is agreeable to return if any new symptomatology or worsening symptoms develop. However would like to be discharged at this time. We will plan to discharge patient at this time with follow-up with PCP [GEORGE]      ED Course User Index  [GEORGE] Rommel Finney, DO       --------------------------------------------- PAST HISTORY ---------------------------------------------  Past Medical History:  has a past medical history of Asthma, CHF (congestive heart failure) ([facility] [address] CKD (chronic kidney disease), HLD (hyperlipidemia), Hypertension, and Valvular heart disease. Past Surgical History:  has a past surgical history that includes Kidney surgery; Tubal ligation; and Tonsillectomy. Social History:  reports that she has been smoking cigarettes. She started smoking about 23 years ago. She has a 23.00 pack-year smoking history. She has never used smokeless tobacco. She reports that she does not currently use alcohol. She reports that she does not use drugs. Family History: family history includes Diabetes in her mother; Heart Surgery (age of onset: 67) in her father; High Blood Pressure in her mother; Hypertension in her father; Stroke in her mother. The patients home medications have been reviewed.     Allergies: Pcn [penicillins]    -------------------------------------------------- RESULTS -------------------------------------------------  Labs:  Results for orders placed or performed during the hospital encounter of 12/11/22   COVID-19, Rapid    Specimen: Nasopharyngeal Swab   Result Value Ref Range    SARS-CoV-2, NAAT Not Detected Not Detected   Rapid influenza A/B antigens    Specimen: Nasopharyngeal   Result Value Ref Range    Influenza A by PCR DETECTED (A) Not Detected    Influenza B by PCR Not Detected Not Detected   CBC with Auto Differential   Result Value Ref Range    WBC 8.4 4.5 - 11.5 E9/L    RBC 4.24 3.50 - 5.50 E12/L    Hemoglobin 14.4 11.5 - 15.5 g/dL    Hematocrit 43.4 34.0 - 48.0 %    .4 (H) 80.0 - 99.9 fL    MCH 34.0 26.0 - 35.0 pg    MCHC 33.2 32.0 - 34.5 %    RDW 13.1 11.5 - 15.0 fL    Platelets 424 892 - 908 E9/L    MPV 12.0 7.0 - 12.0 fL    Neutrophils % 61.8 43.0 - 80.0 %    Immature Granulocytes % 0.2 0.0 - 5.0 %    Lymphocytes % 22.8 20.0 - 42.0 %    Monocytes % 14.8 (H) 2.0 - 12.0 %    Eosinophils % 0.0 0.0 - 6.0 %    Basophils % 0.4 0.0 - 2.0 %    Neutrophils Absolute 5.19 1.80 - 7.30 E9/L    Immature Granulocytes # 0.02 E9/L    Lymphocytes Absolute 1.91 1.50 - 4.00 E9/L    Monocytes Absolute 1.24 (H) 0.10 - 0.95 E9/L    Eosinophils Absolute 0.00 (L) 0.05 - 0.50 E9/L    Basophils Absolute 0.03 0.00 - 0.20 E9/L   CMP   Result Value Ref Range    Sodium 133 132 - 146 mmol/L    Potassium 4.5 3.5 - 5.0 mmol/L    Chloride 99 98 - 107 mmol/L    CO2 17 (L) 22 - 29 mmol/L    Anion Gap 17 (H) 7 - 16 mmol/L    Glucose 94 74 - 99 mg/dL    BUN 43 (H) 6 - 23 mg/dL    Creatinine 1.9 (H) 0.5 - 1.0 mg/dL    Est, Glom Filt Rate 29 >=60 mL/min/1.73    Calcium 10.2 8.6 - 10.2 mg/dL    Total Protein 8.1 6.4 - 8.3 g/dL    Albumin 3.8 3.5 - 5.2 g/dL    Total Bilirubin 0.6 0.0 - 1.2 mg/dL    Alkaline Phosphatase 73 35 - 104 U/L    ALT 23 0 - 32 U/L    AST 26 0 - 31 U/L   Lactic Acid   Result Value Ref Range    Lactic Acid 3.5 (HH) 0.5 - 2.2 mmol/L   Urinalysis   Result Value Ref Range    Color, UA Yellow Straw/Yellow    Clarity, UA Clear Clear    Glucose, Ur Negative Negative mg/dL    Bilirubin Urine SMALL (A) Negative    Ketones, Urine TRACE (A) Negative mg/dL    Specific Gravity, UA 1.025 1.005 - 1.030    Blood, Urine TRACE-INTACT Negative    pH, UA 5.5 5.0 - 9.0    Protein,  (A) Negative mg/dL    Urobilinogen, Urine 0.2 <2.0 E.U./dL    Nitrite, Urine Negative Negative    Leukocyte Esterase, Urine Negative Negative   Lipase   Result Value Ref Range    Lipase 46 13 - 60 U/L   Microscopic Urinalysis   Result Value Ref Range    WBC, UA 0-1 0 - 5 /HPF    RBC, UA 1-3 0 - 2 /HPF    Bacteria, UA FEW (A) None Seen /HPF   Lactic Acid   Result Value Ref Range    Lactic Acid 2.0 0.5 - 2.2 mmol/L   EKG 12 Lead   Result Value Ref Range    Ventricular Rate 90 BPM    Atrial Rate 90 BPM    P-R Interval 146 ms    QRS Duration 86 ms    Q-T Interval 372 ms    QTc Calculation (Bazett) 455 ms    P Axis 70 degrees    R Axis 52 degrees    T Axis -160 degrees       Radiology:  CT ABDOMEN PELVIS WO CONTRAST Additional Contrast? None   Final Result   No acute intraabdominal or intrapelvic pathology. XR CHEST 1 VIEW   Final Result   No significant congestive heart failure.             ------------------------- NURSING NOTES AND VITALS REVIEWED ---------------------------  Date / Time Roomed:  12/11/2022 10:24 PM  ED Bed Assignment:  [place] DD/[facility]    The nursing notes within the ED encounter and vital signs as below have been reviewed. BP (!) 146/76   Pulse 85   Temp 97.9 °F (36.6 °C) (Oral)   Resp 20   Ht 5' 5\" (1.651 m)   Wt 131 lb (59.4 kg)   SpO2 97%   BMI 21.80 kg/m²   Oxygen Saturation Interpretation: Normal      ------------------------------------------ PROGRESS NOTES ------------------------------------------  I have spoken with the patient and discussed todays results, in addition to providing specific details for the plan of care and counseling regarding the diagnosis and prognosis. Their questions are answered at this time and they are agreeable with the plan. I discussed at length with them reasons for immediate return here for re evaluation. They will followup with primary care by calling their office tomorrow.       --------------------------------- ADDITIONAL PROVIDER NOTES ---------------------------------  At this time the patient is without objective evidence of an acute process requiring hospitalization or inpatient management. They have remained hemodynamically stable throughout their entire ED visit and are stable for discharge with outpatient follow-up. The plan has been discussed in detail and they are aware of the specific conditions for emergent return, as well as the importance of follow-up. New Prescriptions    ONDANSETRON (ZOFRAN) 4 MG TABLET    Take 1 tablet by mouth 3 times daily as needed for Nausea or Vomiting    OSELTAMIVIR (TAMIFLU) 30 MG CAPSULE    Take 1 capsule by mouth daily for 5 days       Diagnosis:  1. Flank pain    2. Influenza A    3. Lactic acidosis    4. Elevated serum creatinine        Disposition:  Patient's disposition: Discharge to home  Patient's condition is stable. Attending was present and available throughout encounter including all critical portions;  See Attending Note/Attestation for Final [address], DO  Resident  12/12/22 1614

## 2022-12-12 NOTE — ED NOTES
Department of Emergency Medicine  FIRST PROVIDER TRIAGE NOTE             Independent MLP           12/11/22  9:32 PM EST    Date of Encounter: 12/11/22   MRN: [id number]      HPI: Ebenezer Baltazar is a 74 y.o. female who presents to the ED for Flank Pain (Right flank pain x 4 days. Denies fall or injury. Denies urinary symptoms. Denies fever/chills. +dizziness. +diarrhea.)       ROS: Negative for cp or sob. PE: Gen Appearance/Constitutional: alert  Musculoskeletal: moves all extremities x 4     Initial Plan of Care: All treatment areas with department are currently occupied. Plan to order/Initiate the following while awaiting opening in ED: labs and imaging studies.   Initiate Treatment-Testing, Proceed toTreatment Area When Bed Available for ED Attending/MLP to Continue Care    Electronically signed by Candace Rendon PA-C   DD: 12/11/22       Candace Rendon PA-C  12/11/22 1061

## 2022-12-16 ENCOUNTER — OFFICE VISIT (OUTPATIENT)
Dept: PRIMARY CARE CLINIC | Age: 67
End: 2022-12-16
Payer: MEDICARE

## 2022-12-16 VITALS
DIASTOLIC BLOOD PRESSURE: 60 MMHG | HEIGHT: 65 IN | TEMPERATURE: 97 F | BODY MASS INDEX: 21.83 KG/M2 | WEIGHT: 131 LBS | RESPIRATION RATE: 18 BRPM | SYSTOLIC BLOOD PRESSURE: 100 MMHG

## 2022-12-16 DIAGNOSIS — Z79.899 MEDICATION DOSE INCREASED: ICD-10-CM

## 2022-12-16 DIAGNOSIS — I50.9 CONGESTIVE HEART FAILURE, UNSPECIFIED HF CHRONICITY, UNSPECIFIED HEART FAILURE TYPE (HCC): ICD-10-CM

## 2022-12-16 DIAGNOSIS — R35.0 FREQUENCY OF URINATION: Primary | ICD-10-CM

## 2022-12-16 DIAGNOSIS — Z09 HOSPITAL DISCHARGE FOLLOW-UP: ICD-10-CM

## 2022-12-16 DIAGNOSIS — R30.0 DYSURIA: ICD-10-CM

## 2022-12-16 DIAGNOSIS — J10.1 INFLUENZA A: ICD-10-CM

## 2022-12-16 DIAGNOSIS — I35.0 SEVERE AORTIC STENOSIS: ICD-10-CM

## 2022-12-16 DIAGNOSIS — N39.0 URINARY TRACT INFECTION WITHOUT HEMATURIA, SITE UNSPECIFIED: ICD-10-CM

## 2022-12-16 LAB
BILIRUBIN, POC: NEGATIVE
BLOOD URINE, POC: NORMAL
CLARITY, POC: CLEAR
COLOR, POC: YELLOW
GLUCOSE URINE, POC: NORMAL
KETONES, POC: NEGATIVE
LEUKOCYTE EST, POC: NORMAL
NITRITE, POC: NEGATIVE
PH, POC: 5.5
PROTEIN, POC: NEGATIVE
SPECIFIC GRAVITY, POC: 1.02
UROBILINOGEN, POC: NORMAL

## 2022-12-16 PROCEDURE — G8484 FLU IMMUNIZE NO ADMIN: HCPCS | Performed by: PHYSICIAN ASSISTANT

## 2022-12-16 PROCEDURE — 99214 OFFICE O/P EST MOD 30 MIN: CPT | Performed by: PHYSICIAN ASSISTANT

## 2022-12-16 PROCEDURE — 81002 URINALYSIS NONAUTO W/O SCOPE: CPT | Performed by: PHYSICIAN ASSISTANT

## 2022-12-16 PROCEDURE — G8420 CALC BMI NORM PARAMETERS: HCPCS | Performed by: PHYSICIAN ASSISTANT

## 2022-12-16 PROCEDURE — 4004F PT TOBACCO SCREEN RCVD TLK: CPT | Performed by: PHYSICIAN ASSISTANT

## 2022-12-16 PROCEDURE — G8400 PT W/DXA NO RESULTS DOC: HCPCS | Performed by: PHYSICIAN ASSISTANT

## 2022-12-16 PROCEDURE — 3017F COLORECTAL CA SCREEN DOC REV: CPT | Performed by: PHYSICIAN ASSISTANT

## 2022-12-16 PROCEDURE — 3078F DIAST BP <80 MM HG: CPT | Performed by: PHYSICIAN ASSISTANT

## 2022-12-16 PROCEDURE — 3074F SYST BP LT 130 MM HG: CPT | Performed by: PHYSICIAN ASSISTANT

## 2022-12-16 PROCEDURE — 1090F PRES/ABSN URINE INCON ASSESS: CPT | Performed by: PHYSICIAN ASSISTANT

## 2022-12-16 PROCEDURE — G8427 DOCREV CUR MEDS BY ELIG CLIN: HCPCS | Performed by: PHYSICIAN ASSISTANT

## 2022-12-16 PROCEDURE — 1123F ACP DISCUSS/DSCN MKR DOCD: CPT | Performed by: PHYSICIAN ASSISTANT

## 2022-12-16 RX ORDER — SULFAMETHOXAZOLE AND TRIMETHOPRIM 400; 80 MG/1; MG/1
1 TABLET ORAL 2 TIMES DAILY
Qty: 20 TABLET | Refills: 0 | Status: SHIPPED | OUTPATIENT
Start: 2022-12-16 | End: 2022-12-26

## 2022-12-16 NOTE — PROGRESS NOTES
Chief Complaint   Patient presents with    Urinary Frequency     Low back pain , pressure       HPI:  Daryle Koller presents to the office for ER follow up. Patient was seen in the ER for flank pain and diarrhea on 12/11. She was diagnosed with Influenza A. Since being discharged from the ER Milka's  symptoms have been improving . Any prescribed medications have not been finished. She is taking Tamiflu. Discharge instructions and any medication changes were again reviewed. She complains of burning and urinary frequency now, but did not have this when she went to the ER. She has low back pain. She denies fever. A family member passed away, and she then \"chickened out\" of her aortic valve replacement surgery. We discussed this for quite some time. She agrees to continue to think about it. She is aware that this will not spontaneously resolve, but instead likely worsen. Patient's past medical, surgical, social and/or family history reviewed, updated in chart, and are non-contributory (unless otherwise stated). Medications and allergies also reviewed and updated in chart.       Review of Systems:  Constitutional:  No fever, no fatigue, no chills, no headaches, no weight change  Dermatology:  No rash, no mole, no dry or sensitive skin  ENT:  No cough, no sore throat, no sinus pain, no runny nose, no ear pain  Cardiology:  No chest pain, no palpitations, no leg edema, no shortness of breath, no PND  Gastroenterology:  No dysphagia, no abdominal pain, no nausea, no vomiting, no constipation, no diarrhea, no heartburn  Musculoskeletal:  No joint pain, no leg cramps, no back pain, no muscle aches  Respiratory:  No shortness of breath, no orthopnea, no wheezing, no ALMEIDA, no hemoptysis  Urology:  No blood in the urine, + urinary frequency, no urinary incontinence, no urinary urgency, no nocturia, +dysuria    Vitals:    12/16/22 1111   BP: 100/60   Resp: 18   Temp: 97 °F (36.1 °C)   Weight: 131 lb (59.4 kg)   Height: 5' 5\" (1.651 m)       Physical Exam  Constitutional:       General: She is not in acute distress. Appearance: Normal appearance. She is well-developed. HENT:      Head: Normocephalic and atraumatic. Right Ear: External ear normal.      Left Ear: External ear normal.      Nose: Nose normal.   Eyes:      General: No scleral icterus. Conjunctiva/sclera: Conjunctivae normal.      Pupils: Pupils are equal, round, and reactive to light. Neck:      Thyroid: No thyromegaly. Cardiovascular:      Rate and Rhythm: Normal rate and regular rhythm. Heart sounds: Murmur heard. Pulmonary:      Effort: Pulmonary effort is normal. No accessory muscle usage or respiratory distress. Breath sounds: Normal breath sounds. No wheezing. Abdominal:      General: There is no distension. Palpations: Abdomen is soft. Tenderness: There is abdominal tenderness. There is no right CVA tenderness or left CVA tenderness. Musculoskeletal:         General: Normal range of motion. Cervical back: Normal range of motion and neck supple. Skin:     General: Skin is warm and dry. Findings: No rash. Neurological:      Mental Status: She is alert and oriented to person, place, and time. Deep Tendon Reflexes: Reflexes are normal and symmetric. Psychiatric:         Mood and Affect: Mood and affect normal.         Speech: Speech normal.         Behavior: Behavior normal.       Assessment/Plan:      Jeet Sosa was seen today for urinary frequency. Diagnoses and all orders for this visit:    Frequency of urination  -     POCT Urinalysis no Micro    Urinary tract infection without hematuria, site unspecified  -     sulfamethoxazole-trimethoprim (BACTRIM) 400-80 MG per tablet; Take 1 tablet by mouth 2 times daily for 10 days    Dysuria    Influenza A  - on tamiflu, sx improving    Hospital discharge follow-up  -records reviewed    Severe aortic stenosis    As above.   Call or go to ED immediately if symptoms worsen or persist.  Return if symptoms worsen or fail to improve. , or sooner if necessary. Educational materials and/or home exercises printed for patient's review and were included in patient instructions on his/her After Visit Summary and given to patient at the end of visit. Counseled regarding above diagnosis, including possible risks and complications,  especially if left uncontrolled. Counseled regarding the possible side effects, risks, benefits and alternatives to treatment; patient and/or guardian verbalizes understanding, agrees, feels comfortable with and wishes to proceed with above treatment plan. Advised patient to call with any new medication issues, and read all Rx info from pharmacy to assure aware of all possible risks and side effects of medication before taking. Reviewed age and gender appropriate health screening exams and vaccinations. Advised patient regarding importance of keeping up with recommended health maintenance and to schedule as soon as possible if overdue, as this is important in assessing for undiagnosed pathology, especially cancer, as well as protecting against potentially harmful/life threatening disease. Patient and/or guardian verbalizes understanding and agrees with above counseling, assessment and plan. All questions answered. Jes López PA-C  12/20/2022    I have personally reviewed and updated the chief complaint, HPI, Past Medical, Family and Social History, as well as the above Review of Systems.

## 2023-01-06 DIAGNOSIS — J45.21 MILD INTERMITTENT ASTHMA WITH ACUTE EXACERBATION: ICD-10-CM

## 2023-01-06 RX ORDER — ALBUTEROL SULFATE 90 UG/1
AEROSOL, METERED RESPIRATORY (INHALATION)
Qty: 8.5 G | Refills: 2 | Status: SHIPPED | OUTPATIENT
Start: 2023-01-06

## 2023-01-06 RX ORDER — ALLOPURINOL 100 MG/1
TABLET ORAL
Qty: 60 TABLET | Refills: 1 | Status: SHIPPED | OUTPATIENT
Start: 2023-01-06

## 2023-01-09 ENCOUNTER — TELEPHONE (OUTPATIENT)
Dept: PRIMARY CARE CLINIC | Age: 68
End: 2023-01-09

## 2023-01-09 DIAGNOSIS — M25.361 UNSTABLE RIGHT KNEE: Primary | ICD-10-CM

## 2023-01-09 NOTE — TELEPHONE ENCOUNTER
Patient phoned stating she would like a referral for a xray right knee because \"it keeps going out on her. \"  She is going to the hospital tomorrow for something else and would like to get that done at the same time. Please advise.

## 2023-01-10 ENCOUNTER — HOSPITAL ENCOUNTER (OUTPATIENT)
Dept: OTHER | Age: 68
Setting detail: THERAPIES SERIES
Discharge: HOME | End: 2023-01-10
Payer: MEDICARE

## 2023-01-10 ENCOUNTER — HOSPITAL ENCOUNTER (OUTPATIENT)
Dept: GENERAL RADIOLOGY | Age: 68
Discharge: HOME | End: 2023-01-12
Payer: MEDICARE

## 2023-01-10 ENCOUNTER — HOSPITAL ENCOUNTER (OUTPATIENT)
Age: 68
Discharge: HOME | End: 2023-01-12
Payer: MEDICARE

## 2023-01-10 VITALS
BODY MASS INDEX: 22.13 KG/M2 | DIASTOLIC BLOOD PRESSURE: 58 MMHG | SYSTOLIC BLOOD PRESSURE: 136 MMHG | HEART RATE: 64 BPM | RESPIRATION RATE: 18 BRPM | OXYGEN SATURATION: 100 % | WEIGHT: 133 LBS

## 2023-01-10 DIAGNOSIS — M25.361 UNSTABLE RIGHT KNEE: ICD-10-CM

## 2023-01-10 LAB
ANION GAP SERPL CALCULATED.3IONS-SCNC: 13 MMOL/L (ref 7–16)
BUN BLDV-MCNC: 29 MG/DL (ref 6–23)
CALCIUM SERPL-MCNC: 9.5 MG/DL (ref 8.6–10.2)
CHLORIDE BLD-SCNC: 104 MMOL/L (ref 98–107)
CO2: 26 MMOL/L (ref 22–29)
CREAT SERPL-MCNC: 1.7 MG/DL (ref 0.5–1)
GFR SERPL CREATININE-BSD FRML MDRD: 33 ML/MIN/1.73
GLUCOSE BLD-MCNC: 100 MG/DL (ref 74–99)
POTASSIUM SERPL-SCNC: 3.7 MMOL/L (ref 3.5–5)
PRO-BNP: 795 PG/ML (ref 0–125)
SODIUM BLD-SCNC: 143 MMOL/L (ref 132–146)

## 2023-01-10 PROCEDURE — 36415 COLL VENOUS BLD VENIPUNCTURE: CPT

## 2023-01-10 PROCEDURE — 99214 OFFICE O/P EST MOD 30 MIN: CPT

## 2023-01-10 PROCEDURE — 80048 BASIC METABOLIC PNL TOTAL CA: CPT

## 2023-01-10 PROCEDURE — 73562 X-RAY EXAM OF KNEE 3: CPT

## 2023-01-10 PROCEDURE — 83880 ASSAY OF NATRIURETIC PEPTIDE: CPT

## 2023-01-10 NOTE — PLAN OF CARE
Problem: Chronic Conditions and Co-morbidities  Goal: Patient's chronic conditions and co-morbidity symptoms are monitored and maintained or improved  Flowsheets (Taken 1/10/2023 9900)  Care Plan - Patient's Chronic Conditions and Co-Morbidity Symptoms are Monitored and Maintained or Improved: Monitor and assess patient's chronic conditions and comorbid symptoms for stability, deterioration, or improvement

## 2023-01-10 NOTE — PROGRESS NOTES
[facility]   [facility]         Milka Quinteros   1955          Referring Provider:   Primary Care Physician: RODOLFO Tineo   Cardiologist:DR. COOPER   Nephrologist:N/A        History of Present Illness:     Milka Quinteros is a 67 y.o. female with a history of HFrEF, most recent EF 30% ON 5/25/22.     Patient Story:    She does  NOT have dyspnea with exertion, shortness of breath, or decline in overall functional capacity. She does not have orthopnea, PND, nocturnal cough or hemoptysis. She does not have abdominal distention or bloating, early satiety, anorexia/change in appetite. She does have a good urinary response to oral diuretic. She does not have lower extremity edema. She denies lightheadedness, dizziness. She denies palpitations, syncope or near syncope. She does not complain of chest pain, pressure, discomfort.         Allergies   Allergen Reactions    Pcn [Penicillins]            Prior to Visit Medications    Medication Sig Taking? Authorizing Provider   allopurinol (ZYLOPRIM) 100 MG tablet TAKE 1 TABLET TWO (2) TIMES A DAY.  Tiffanie Tineo PA-C   albuterol sulfate HFA (PROVENTIL;VENTOLIN;PROAIR) 108 (90 Base) MCG/ACT inhaler USE (2) INHALATIONS EVERY (4) HOURS AS NEEDED FOR WHEEZING/SHORTNESS OF BREATHE  Tiffanie Tineo PA-C   sacubitril-valsartan (ENTRESTO)  MG per tablet Take 1 tablet by mouth 2 times daily  AILEEN Prajapati CNP   ondansetron (ZOFRAN) 4 MG tablet Take 1 tablet by mouth 3 times daily as needed for Nausea or Vomiting  Reggie Mahajan DO   atorvastatin (LIPITOR) 40 MG tablet TAKE ONE (1) TABLET DAILY.  Tiffanie Tineo PA-C   empagliflozin (JARDIANCE) 10 MG tablet Take 1 tablet by mouth daily  Raf Cooper MD   fluticasone (FLOVENT HFA) 220 MCG/ACT inhaler Inhale 2 puffs into the lungs 2 times daily  Tiffanie Tineo PA-C   bumetanide (BUMEX) 1 MG tablet TAKE ONE (1) TABLET DAILY.  AILEEN Prajapati CNP   metoprolol succinate  (TOPROL XL) 100 MG extended release tablet Take 1 tablet by mouth in the morning and at bedtime  Chaya Wallace APRN - CNP   spironolactone (ALDACTONE) 25 MG tablet TAKE ONE (1) TABLET DAILY. Shannon Kendrick MD   ASPIRIN LOW DOSE 81 MG EC tablet take 1 tablet by mouth every day  Theo Lee PA-C   fluticasone [facility]) 50 MCG/ACT nasal spray 2 SPRAYS BY NASAL ROUTE DAILY  Theo Lee PA-C   mineral oil-hydrophilic petrolatum (AQUAPHOR) ointment Apply topically as needed. Theo Lee PA-C   loratadine (CLARITIN) 10 MG tablet Take 10 mg by mouth daily  Historical Provider, MD   meloxicam (MOBIC) 7.5 MG tablet Take 1 tablet by mouth daily as needed for Pain  Dixie Nieto DO           Guideline directed medical:  ARNI/ACE I/ARB: Yes  Beta blocker:   Yes  Aldosterone antagonist:  Yes  JARDIANCE      Physical Examination:     BP (!) 136/58   Pulse 64   Resp 18   Wt 133 lb (60.3 kg)   SpO2 100%   BMI 22.13 kg/m²     Assessment  Charting Type: Shift assessment              HEENT (Head, Ears, Eyes, Nose, & Throat)  HEENT (WDL): Exceptions to WDL  Right Eye: Glasses  Left Eye: Glasses    Respiratory  Respiratory Pattern: Regular  Respiratory Depth: Normal  Respiratory Quality/Effort: Unlabored  Chest Assessment: Chest expansion symmetrical  L Breath Sounds: Diminished, Rhonchi  R Breath Sounds: Diminished, Rhonchi              Cardiac  Cardiac Rhythm: Sinus rhythm    Rhythm Interpretation  Heart Rate: 64         Gastrointestinal  Abdominal (WDL): Within Defined Limits               Peripheral Vascular  Peripheral Vascular (WDL): Within Defined Limits  Edema: None                                                 Heart Rate: 64                     LAB DATA:    Last 3 BMP      Sodium (mmol/L)   Date Value   12/11/2022 133   12/01/2022 142   11/17/2022 139     Potassium (mmol/L)   Date Value   12/11/2022 4.5   12/01/2022 3.8   11/17/2022 3.9     Potassium reflex Magnesium (mmol/L)   Date Value   05/24/2022 4.1 Chloride (mmol/L)   Date Value   12/11/2022 99   12/01/2022 104   11/17/2022 103     CO2 (mmol/L)   Date Value   12/11/2022 17 (L)   12/01/2022 29   11/17/2022 26     BUN (mg/dL)   Date Value   12/11/2022 43 (H)   12/01/2022 19   11/17/2022 26 (H)     Glucose (mg/dL)   Date Value   12/11/2022 94   12/01/2022 71 (L)   11/17/2022 81     Calcium (mg/dL)   Date Value   12/11/2022 10.2   12/01/2022 9.6   11/17/2022 9.7       Last 3 BNP       Pro-BNP (pg/mL)   Date Value   12/01/2022 1,106 (H)   11/17/2022 687 (H)   11/08/2022 1,573 (H)          CBC: No results for input(s): WBC, HGB, PLT in the last 72 hours. BMP:  No results for input(s): NA, K, CL, CO2, BUN, CREATININE, GLUCOSE in the last 72 hours. Hepatic: No results for input(s): AST, ALT, ALB, BILITOT, ALKPHOS in the last 72 hours. Troponin: No results for input(s): TROPONINI in the last 72 hours. BNP: No results for input(s): BNP in the last 72 hours. Lipids: No results for input(s): CHOL, HDL in the last 72 hours. Invalid input(s): LDLCALCU  INR: No results for input(s): INR in the last 72 hours. WEIGHTS:    Wt Readings from Last 3 Encounters:   01/10/23 133 lb (60.3 kg)   12/16/22 131 lb (59.4 kg)   12/11/22 131 lb (59.4 kg)         TELEMETRY:  Cardiac Regularity: REGULAR  Cardiac Rhythm/Interpretation: NSR        ASSESSMENT:  George Dawkins is evolemic with stable weights. PT DENIES ANY DISCOMFORT AND IS REQUESTING 3 MONTH APPT. AND WILL CALL FOR EARLIER APPT. IF NEEDED.       Interventions completed this visit:  IV diuretics given: No  Lab work obtained: Yes, BNP/BMP   Reviewed currently prescribed medications with patient, educated on importance of compliance and answered any questions regarding their medication  Educated on signs and symptoms of HF  Educated on low sodium diet    PLAN:  Future Appointments   Date Time Provider Chin Wesley   4/12/2023  9:45 AM [facility] CHF ROOM 1 [facility]   5/3/2023 10:30 AM Lucia Wilson PA-C [facility]         Given clinic phone number and aware of signs and symptoms to call with any HF change in symptoms

## 2023-01-11 ENCOUNTER — OFFICE VISIT (OUTPATIENT)
Dept: PRIMARY CARE CLINIC | Age: 68
End: 2023-01-11
Payer: MEDICARE

## 2023-01-11 VITALS
WEIGHT: 133 LBS | HEIGHT: 65 IN | TEMPERATURE: 97.1 F | BODY MASS INDEX: 22.16 KG/M2 | RESPIRATION RATE: 18 BRPM | SYSTOLIC BLOOD PRESSURE: 130 MMHG | DIASTOLIC BLOOD PRESSURE: 60 MMHG

## 2023-01-11 DIAGNOSIS — I10 PRIMARY HYPERTENSION: ICD-10-CM

## 2023-01-11 DIAGNOSIS — N18.32 STAGE 3B CHRONIC KIDNEY DISEASE (HCC): ICD-10-CM

## 2023-01-11 DIAGNOSIS — M25.561 ACUTE PAIN OF RIGHT KNEE: Primary | ICD-10-CM

## 2023-01-11 DIAGNOSIS — I42.9 CARDIOMYOPATHY, UNSPECIFIED TYPE (HCC): ICD-10-CM

## 2023-01-11 DIAGNOSIS — I35.0 SEVERE AORTIC STENOSIS: ICD-10-CM

## 2023-01-11 PROCEDURE — 3078F DIAST BP <80 MM HG: CPT | Performed by: PHYSICIAN ASSISTANT

## 2023-01-11 PROCEDURE — 1123F ACP DISCUSS/DSCN MKR DOCD: CPT | Performed by: PHYSICIAN ASSISTANT

## 2023-01-11 PROCEDURE — G8484 FLU IMMUNIZE NO ADMIN: HCPCS | Performed by: PHYSICIAN ASSISTANT

## 2023-01-11 PROCEDURE — 3074F SYST BP LT 130 MM HG: CPT | Performed by: PHYSICIAN ASSISTANT

## 2023-01-11 PROCEDURE — 99214 OFFICE O/P EST MOD 30 MIN: CPT | Performed by: PHYSICIAN ASSISTANT

## 2023-01-11 PROCEDURE — G8427 DOCREV CUR MEDS BY ELIG CLIN: HCPCS | Performed by: PHYSICIAN ASSISTANT

## 2023-01-11 PROCEDURE — 3017F COLORECTAL CA SCREEN DOC REV: CPT | Performed by: PHYSICIAN ASSISTANT

## 2023-01-11 PROCEDURE — G8400 PT W/DXA NO RESULTS DOC: HCPCS | Performed by: PHYSICIAN ASSISTANT

## 2023-01-11 PROCEDURE — 1090F PRES/ABSN URINE INCON ASSESS: CPT | Performed by: PHYSICIAN ASSISTANT

## 2023-01-11 PROCEDURE — 4004F PT TOBACCO SCREEN RCVD TLK: CPT | Performed by: PHYSICIAN ASSISTANT

## 2023-01-11 PROCEDURE — G8420 CALC BMI NORM PARAMETERS: HCPCS | Performed by: PHYSICIAN ASSISTANT

## 2023-01-11 RX ORDER — METHYLPREDNISOLONE 4 MG/1
TABLET ORAL
Qty: 1 KIT | Refills: 0 | Status: SHIPPED | OUTPATIENT
Start: 2023-01-11 | End: 2023-01-17

## 2023-01-11 RX ORDER — MEDICAL SUPPLY, MISCELLANEOUS
1 EACH MISCELLANEOUS DAILY
Qty: 1 EACH | Refills: 0 | Status: SHIPPED | OUTPATIENT
Start: 2023-01-11

## 2023-01-11 ASSESSMENT — PATIENT HEALTH QUESTIONNAIRE - PHQ9
2. FEELING DOWN, DEPRESSED OR HOPELESS: 0
SUM OF ALL RESPONSES TO PHQ QUESTIONS 1-9: 0
1. LITTLE INTEREST OR PLEASURE IN DOING THINGS: 0
SUM OF ALL RESPONSES TO PHQ9 QUESTIONS 1 & 2: 0
SUM OF ALL RESPONSES TO PHQ QUESTIONS 1-9: 0

## 2023-01-11 NOTE — PROGRESS NOTES
Pain:  Patient is here today with complaints of right knee pain. This is a/an acute problem. This has been going on for 5 day(s). Exacerbating factors include walk and driving. Alleviating factors include knee brace. Pain is sharp in nature. Pain is not radiating. This has happen to patient before. Imaging to date includes 1/10/2023. Therapy to date includes none. Labs to date include yesterday, cr 1.7 gfr 33. When I asked her if she thought of the valve surgery any further, she states \"Going through a difficult time right now.  left me. \" This was her answer when I asked about the aortic valve replacement. Patient's past medical, surgical, social and/or family history reviewed, updated in chart, and are non-contributory (unless otherwise stated). Medications and allergies also reviewed and updated in chart. Review of Systems:  Constitutional:  No fever, no fatigue, no chills, no headaches, no weight change  Dermatology:  No rash, no mole, no dry or sensitive skin  ENT:  No cough, no sore throat, no sinus pain, no runny nose, no ear pain  Cardiology:  No chest pain, no palpitations, no leg edema, no shortness of breath, no PND  Gastroenterology:  No dysphagia, no abdominal pain, no nausea, no vomiting, no constipation, no diarrhea, no heartburn  Musculoskeletal: + joint pain, no leg cramps, no back pain, no muscle aches  Respiratory:  No shortness of breath, no orthopnea, no wheezing, no ALMEIDA, no hemoptysis  Urology:  No blood in the urine, no urinary frequency, no urinary incontinence, no urinary urgency, no nocturia, no dysuria    Vitals:    01/11/23 1226   BP: 130/60   Resp: 18   Temp: 97.1 °F (36.2 °C)   Weight: 133 lb (60.3 kg)   Height: 5' 5\" (1.651 m)       Physical Exam  Constitutional:       General: She is not in acute distress. Appearance: Normal appearance. She is well-developed. HENT:      Head: Normocephalic and atraumatic.       Right Ear: External ear normal. Left Ear: External ear normal.      Nose: Nose normal.   Eyes:      General: No scleral icterus. Conjunctiva/sclera: Conjunctivae normal.      Pupils: Pupils are equal, round, and reactive to light. Neck:      Thyroid: No thyromegaly. Cardiovascular:      Rate and Rhythm: Normal rate and regular rhythm. Heart sounds: Murmur (3/6 systolic) heard. Pulmonary:      Effort: Pulmonary effort is normal. No accessory muscle usage or respiratory distress. Breath sounds: Normal breath sounds. No wheezing. Musculoskeletal:         General: Tenderness (right medial knee) present. Normal range of motion. Cervical back: Normal range of motion and neck supple. Skin:     General: Skin is warm and dry. Findings: No rash. Neurological:      Mental Status: She is alert and oriented to person, place, and time. Deep Tendon Reflexes: Reflexes are normal and symmetric. Psychiatric:         Mood and Affect: Mood and affect normal.         Speech: Speech normal.         Behavior: Behavior normal.       Assessment/Plan:      Darlene May was seen today for knee pain. Diagnoses and all orders for this visit:    Acute pain of right knee  -     methylPREDNISolone (MEDROL, KATERINA,) 4 MG tablet; Take as directed    Cardiomyopathy, unspecified type ([address]  -labs reviewed from the chf clinic    Stage 3b chronic kidney disease ([facility] [address]    Severe aortic stenosis  -she has still not decided to have the surgery    Primary hypertension  -     Blood Pressure Monitoring (B-D ASSURE BPM/AUTO ARM CUFF) MISC; 1 Device by Does not apply route daily Check BP daily, keep log  Dx: fluctuating BP    As above. Call or go to ED immediately if symptoms worsen or persist.  Return in about 3 months (around 4/11/2023). , or sooner if necessary. Educational materials and/or home exercises printed for patient's review and were included in patient instructions on his/her After Visit Summary and given to patient at the end of visit. Counseled regarding above diagnosis, including possible risks and complications,  especially if left uncontrolled. Counseled regarding the possible side effects, risks, benefits and alternatives to treatment; patient and/or guardian verbalizes understanding, agrees, feels comfortable with and wishes to proceed with above treatment plan. Advised patient to call with any new medication issues, and read all Rx info from pharmacy to assure aware of all possible risks and side effects of medication before taking. Reviewed age and gender appropriate health screening exams and vaccinations. Advised patient regarding importance of keeping up with recommended health maintenance and to schedule as soon as possible if overdue, as this is important in assessing for undiagnosed pathology, especially cancer, as well as protecting against potentially harmful/life threatening disease. Patient and/or guardian verbalizes understanding and agrees with above counseling, assessment and plan. All questions answered. Bibiana Pena PA-C  1/12/2023    I have personally reviewed and updated the chief complaint, HPI, Past Medical, Family and Social History, as well as the above Review of Systems.

## 2023-02-08 RX ORDER — FLUTICASONE PROPIONATE 220 UG/1
AEROSOL, METERED RESPIRATORY (INHALATION)
Qty: 12 G | Refills: 3 | Status: SHIPPED | OUTPATIENT
Start: 2023-02-08

## 2023-02-10 RX ORDER — METOPROLOL SUCCINATE 100 MG/1
100 TABLET, EXTENDED RELEASE ORAL 2 TIMES DAILY
Qty: 60 TABLET | Refills: 3 | Status: SHIPPED | OUTPATIENT
Start: 2023-02-10

## 2023-02-10 RX ORDER — BUMETANIDE 1 MG/1
TABLET ORAL
Qty: 30 TABLET | Refills: 3 | Status: SHIPPED | OUTPATIENT
Start: 2023-02-10

## 2023-02-14 RX ORDER — FLUTICASONE PROPIONATE 220 UG/1
AEROSOL, METERED RESPIRATORY (INHALATION)
Qty: 12 G | Refills: 3 | Status: SHIPPED | OUTPATIENT
Start: 2023-02-14

## 2023-02-14 RX ORDER — SPIRONOLACTONE 25 MG/1
TABLET ORAL
Qty: 30 TABLET | Refills: 3 | Status: SHIPPED | OUTPATIENT
Start: 2023-02-14

## 2023-03-03 ENCOUNTER — COMMUNITY OUTREACH (OUTPATIENT)
Dept: PRIMARY CARE CLINIC | Age: 68
End: 2023-03-03

## 2023-03-03 NOTE — PROGRESS NOTES
Patient's HM shows they are overdue for Mammogram, Colorectal Screening and [facility] and  files searched. No results to attach to order nor HM updated.

## 2023-03-06 RX ORDER — ASPIRIN 81 MG/1
TABLET, COATED ORAL
Qty: 30 TABLET | Refills: 5 | Status: SHIPPED | OUTPATIENT
Start: 2023-03-06

## 2023-03-06 RX ORDER — ALLOPURINOL 100 MG/1
TABLET ORAL
Qty: 60 TABLET | Refills: 1 | Status: SHIPPED | OUTPATIENT
Start: 2023-03-06

## 2023-03-22 DIAGNOSIS — Z79.899 MEDICATION DOSE INCREASED: ICD-10-CM

## 2023-03-22 DIAGNOSIS — I50.9 CONGESTIVE HEART FAILURE, UNSPECIFIED HF CHRONICITY, UNSPECIFIED HEART FAILURE TYPE (HCC): ICD-10-CM

## 2023-03-22 RX ORDER — SACUBITRIL AND VALSARTAN 97; 103 MG/1; MG/1
TABLET, FILM COATED ORAL
Qty: 60 TABLET | Refills: 3 | Status: SHIPPED | OUTPATIENT
Start: 2023-03-22

## 2023-03-29 DIAGNOSIS — I50.9 CONGESTIVE HEART FAILURE, UNSPECIFIED HF CHRONICITY, UNSPECIFIED HEART FAILURE TYPE (HCC): ICD-10-CM

## 2023-03-29 DIAGNOSIS — Z79.899 MEDICATION DOSE INCREASED: ICD-10-CM

## 2023-03-29 RX ORDER — SACUBITRIL AND VALSARTAN 97; 103 MG/1; MG/1
TABLET, FILM COATED ORAL
Qty: 60 TABLET | Refills: 3 | Status: SHIPPED | OUTPATIENT
Start: 2023-03-29

## 2023-03-31 DIAGNOSIS — R87.613 HSIL ON PAP SMEAR OF CERVIX: Primary | ICD-10-CM

## 2023-04-04 ENCOUNTER — TELEPHONE (OUTPATIENT)
Dept: PRIMARY CARE CLINIC | Age: 68
End: 2023-04-04

## 2023-04-04 NOTE — TELEPHONE ENCOUNTER
Place new referral to Dr Stephanie Spence because Dr Hyacinth Bose does not take her insurance and his office does.       Fax#[phone]

## 2023-04-04 NOTE — TELEPHONE ENCOUNTER
----- Message from Andi Warren sent at 4/4/2023 11:36 AM EDT -----  Subject: Referral Request    Reason for referral request? Gynecologists' need new referral   recommendation. Provider patient wants to be referred to(if known): Amaris Bernstein    Provider Phone Number(if known): Additional Information for Provider? Wants a call back.    ---------------------------------------------------------------------------  --------------  Cholo SANCHEZPREMA    [phone]; OK to leave message on voicemail  ---------------------------------------------------------------------------  --------------

## 2023-04-04 NOTE — TELEPHONE ENCOUNTER
Phoned patient back Dr Juan Oliver office doesn't accept her insurance.   Dr Elmo Zapata office does will tell provider to place a new referral.

## 2023-04-05 DIAGNOSIS — R87.613 HSIL (HIGH GRADE SQUAMOUS INTRAEPITHELIAL LESION) ON PAP SMEAR OF CERVIX: Primary | ICD-10-CM

## 2023-04-13 ENCOUNTER — TELEPHONE (OUTPATIENT)
Dept: PRIMARY CARE CLINIC | Age: 68
End: 2023-04-13

## 2023-04-20 ENCOUNTER — TELEPHONE (OUTPATIENT)
Dept: PRIMARY CARE CLINIC | Age: 68
End: 2023-04-20

## 2023-04-20 NOTE — TELEPHONE ENCOUNTER
Patient phoned stating Loratadine not covered by insurance anymore can you prescribe a different antihistamine for her. Please advise.     [address]

## 2023-04-21 RX ORDER — CETIRIZINE HYDROCHLORIDE 10 MG/1
10 TABLET ORAL DAILY
Qty: 30 TABLET | Refills: 5 | Status: SHIPPED | OUTPATIENT
Start: 2023-04-21

## 2023-04-24 DIAGNOSIS — J45.21 MILD INTERMITTENT ASTHMA WITH ACUTE EXACERBATION: ICD-10-CM

## 2023-04-24 RX ORDER — ATORVASTATIN CALCIUM 40 MG/1
TABLET, FILM COATED ORAL
Qty: 30 TABLET | Refills: 5 | Status: SHIPPED | OUTPATIENT
Start: 2023-04-24

## 2023-04-24 RX ORDER — ALLOPURINOL 100 MG/1
TABLET ORAL
Qty: 60 TABLET | Refills: 1 | Status: SHIPPED | OUTPATIENT
Start: 2023-04-24

## 2023-04-24 RX ORDER — ALBUTEROL SULFATE 90 UG/1
AEROSOL, METERED RESPIRATORY (INHALATION)
Qty: 6.7 G | Refills: 2 | Status: SHIPPED | OUTPATIENT
Start: 2023-04-24

## 2023-05-04 ENCOUNTER — PATIENT MESSAGE (OUTPATIENT)
Dept: CARDIOLOGY CLINIC | Age: 68
End: 2023-05-04

## 2023-05-04 ENCOUNTER — HOSPITAL ENCOUNTER (OUTPATIENT)
Age: 68
Discharge: HOME | End: 2023-05-04
Payer: MEDICARE

## 2023-05-04 DIAGNOSIS — E87.5 HYPERKALEMIA: ICD-10-CM

## 2023-05-04 LAB
ANION GAP SERPL CALCULATED.3IONS-SCNC: 12 MMOL/L (ref 7–16)
BUN SERPL-MCNC: 28 MG/DL (ref 6–23)
CALCIUM SERPL-MCNC: 9.2 MG/DL (ref 8.6–10.2)
CHLORIDE SERPL-SCNC: 103 MMOL/L (ref 98–107)
CO2 SERPL-SCNC: 25 MMOL/L (ref 22–29)
CREAT SERPL-MCNC: 1.9 MG/DL (ref 0.5–1)
GLUCOSE SERPL-MCNC: 125 MG/DL (ref 74–99)
POTASSIUM SERPL-SCNC: 3.8 MMOL/L (ref 3.5–5)
SODIUM SERPL-SCNC: 140 MMOL/L (ref 132–146)

## 2023-05-04 PROCEDURE — 80048 BASIC METABOLIC PNL TOTAL CA: CPT

## 2023-05-04 PROCEDURE — 36415 COLL VENOUS BLD VENIPUNCTURE: CPT

## 2023-05-05 ENCOUNTER — TELEPHONE (OUTPATIENT)
Dept: CARDIOLOGY CLINIC | Age: 68
End: 2023-05-05

## 2023-05-05 DIAGNOSIS — J45.21 MILD INTERMITTENT ASTHMA WITH ACUTE EXACERBATION: ICD-10-CM

## 2023-05-05 RX ORDER — ALLOPURINOL 100 MG/1
TABLET ORAL
Qty: 60 TABLET | Refills: 2 | Status: SHIPPED | OUTPATIENT
Start: 2023-05-05

## 2023-05-05 RX ORDER — ALBUTEROL SULFATE 90 UG/1
AEROSOL, METERED RESPIRATORY (INHALATION)
Qty: 6.7 G | Refills: 2 | Status: SHIPPED | OUTPATIENT
Start: 2023-05-05

## 2023-05-05 RX ORDER — ATORVASTATIN CALCIUM 40 MG/1
TABLET, FILM COATED ORAL
Qty: 30 TABLET | Refills: 2 | Status: SHIPPED | OUTPATIENT
Start: 2023-05-05

## 2023-05-09 NOTE — TELEPHONE ENCOUNTER
My Open Encounters  (Newest Message First)  Merri Schlatter, MD  Emy Plan 4 hours ago (12:23 PM)     RS  Labs look fine.

## 2023-05-16 ENCOUNTER — OFFICE VISIT (OUTPATIENT)
Dept: CARDIOLOGY CLINIC | Age: 68
End: 2023-05-16
Payer: MEDICARE

## 2023-05-16 VITALS
WEIGHT: 129 LBS | HEART RATE: 64 BPM | BODY MASS INDEX: 21.49 KG/M2 | DIASTOLIC BLOOD PRESSURE: 48 MMHG | HEIGHT: 65 IN | SYSTOLIC BLOOD PRESSURE: 138 MMHG | RESPIRATION RATE: 16 BRPM

## 2023-05-16 DIAGNOSIS — I50.9 CONGESTIVE HEART FAILURE, UNSPECIFIED HF CHRONICITY, UNSPECIFIED HEART FAILURE TYPE (HCC): ICD-10-CM

## 2023-05-16 DIAGNOSIS — I10 PRIMARY HYPERTENSION: ICD-10-CM

## 2023-05-16 DIAGNOSIS — E78.00 PURE HYPERCHOLESTEROLEMIA: ICD-10-CM

## 2023-05-16 DIAGNOSIS — I35.0 SEVERE AORTIC STENOSIS: ICD-10-CM

## 2023-05-16 DIAGNOSIS — R25.1 TREMOR: ICD-10-CM

## 2023-05-16 DIAGNOSIS — I50.41 ACUTE COMBINED SYSTOLIC AND DIASTOLIC CONGESTIVE HEART FAILURE (HCC): Primary | ICD-10-CM

## 2023-05-16 PROCEDURE — 99214 OFFICE O/P EST MOD 30 MIN: CPT | Performed by: INTERNAL MEDICINE

## 2023-05-16 PROCEDURE — 1090F PRES/ABSN URINE INCON ASSESS: CPT | Performed by: INTERNAL MEDICINE

## 2023-05-16 PROCEDURE — 93000 ELECTROCARDIOGRAM COMPLETE: CPT | Performed by: INTERNAL MEDICINE

## 2023-05-16 PROCEDURE — 3017F COLORECTAL CA SCREEN DOC REV: CPT | Performed by: INTERNAL MEDICINE

## 2023-05-16 PROCEDURE — 3078F DIAST BP <80 MM HG: CPT | Performed by: INTERNAL MEDICINE

## 2023-05-16 PROCEDURE — 4004F PT TOBACCO SCREEN RCVD TLK: CPT | Performed by: INTERNAL MEDICINE

## 2023-05-16 PROCEDURE — 1123F ACP DISCUSS/DSCN MKR DOCD: CPT | Performed by: INTERNAL MEDICINE

## 2023-05-16 PROCEDURE — G8400 PT W/DXA NO RESULTS DOC: HCPCS | Performed by: INTERNAL MEDICINE

## 2023-05-16 PROCEDURE — 3075F SYST BP GE 130 - 139MM HG: CPT | Performed by: INTERNAL MEDICINE

## 2023-05-16 PROCEDURE — G8427 DOCREV CUR MEDS BY ELIG CLIN: HCPCS | Performed by: INTERNAL MEDICINE

## 2023-05-16 PROCEDURE — G8420 CALC BMI NORM PARAMETERS: HCPCS | Performed by: INTERNAL MEDICINE

## 2023-05-16 NOTE — PATIENT INSTRUCTIONS
Continue all your medications at current doses. Refer to neurology. Restrict sodium intake to less than 2-2.5 g/day. Restrict fluid intake to less than 2.2 L/day. Goal BP is less than 130/80. If your weight increases by > 2 lbs in a day or >5 lbs in more than a day, take an extra bumex pill. I will see you back in the office in 6 months. Please call the office at ([phone], option 2) if you have any questions.

## 2023-05-16 NOTE — PROGRESS NOTES
conservative therapy. She is currently on guideline directed medical therapy in the form of Toprol- twice daily, Entresto  twice daily, Aldactone 25 mg daily. He is also on Jardiance 10 mg daily. She appears mildly hypervolemic on examination today. She is on Bumex 1 mg daily. She knows to take additional bumetanide as needed. Heart failure education as well as salt restriction counseling provided. She will continue with Lipitor 40 mg daily. She has mild to moderate coronary artery disease and does not need any further ischemic work-up at this time. She complains of ongoing tremors. It appears to be intention tremors. No tremors while she was in the office today. She would like to be referred to neurology for further evaluation. I have placed the order.     She will follow-up with me in 6 months

## 2023-05-23 ENCOUNTER — TELEPHONE (OUTPATIENT)
Dept: PRIMARY CARE CLINIC | Age: 68
End: 2023-05-23

## 2023-05-23 DIAGNOSIS — Z12.31 SCREENING MAMMOGRAM FOR BREAST CANCER: Primary | ICD-10-CM

## 2023-06-06 RX ORDER — METOPROLOL SUCCINATE 100 MG/1
100 TABLET, EXTENDED RELEASE ORAL 2 TIMES DAILY
Qty: 60 TABLET | Refills: 3 | Status: SHIPPED | OUTPATIENT
Start: 2023-06-06

## 2023-06-06 RX ORDER — BUMETANIDE 1 MG/1
TABLET ORAL
Qty: 30 TABLET | Refills: 3 | Status: SHIPPED | OUTPATIENT
Start: 2023-06-06

## 2023-06-26 ENCOUNTER — OFFICE VISIT (OUTPATIENT)
Dept: PRIMARY CARE CLINIC | Age: 68
End: 2023-06-26
Payer: MEDICARE

## 2023-06-26 VITALS
SYSTOLIC BLOOD PRESSURE: 138 MMHG | WEIGHT: 129 LBS | TEMPERATURE: 97.3 F | OXYGEN SATURATION: 99 % | HEIGHT: 65 IN | RESPIRATION RATE: 18 BRPM | BODY MASS INDEX: 21.49 KG/M2 | HEART RATE: 72 BPM | DIASTOLIC BLOOD PRESSURE: 60 MMHG

## 2023-06-26 DIAGNOSIS — I35.0 SEVERE AORTIC STENOSIS: ICD-10-CM

## 2023-06-26 DIAGNOSIS — Z12.11 SCREENING FOR COLORECTAL CANCER: ICD-10-CM

## 2023-06-26 DIAGNOSIS — Z12.12 SCREENING FOR COLORECTAL CANCER: ICD-10-CM

## 2023-06-26 DIAGNOSIS — Z00.00 INITIAL MEDICARE ANNUAL WELLNESS VISIT: Primary | ICD-10-CM

## 2023-06-26 DIAGNOSIS — J30.2 SEASONAL ALLERGIES: ICD-10-CM

## 2023-06-26 PROCEDURE — G0438 PPPS, INITIAL VISIT: HCPCS | Performed by: PHYSICIAN ASSISTANT

## 2023-06-26 PROCEDURE — G8427 DOCREV CUR MEDS BY ELIG CLIN: HCPCS | Performed by: PHYSICIAN ASSISTANT

## 2023-06-26 PROCEDURE — 3075F SYST BP GE 130 - 139MM HG: CPT | Performed by: PHYSICIAN ASSISTANT

## 2023-06-26 PROCEDURE — G8400 PT W/DXA NO RESULTS DOC: HCPCS | Performed by: PHYSICIAN ASSISTANT

## 2023-06-26 PROCEDURE — 4004F PT TOBACCO SCREEN RCVD TLK: CPT | Performed by: PHYSICIAN ASSISTANT

## 2023-06-26 PROCEDURE — 3078F DIAST BP <80 MM HG: CPT | Performed by: PHYSICIAN ASSISTANT

## 2023-06-26 PROCEDURE — 3017F COLORECTAL CA SCREEN DOC REV: CPT | Performed by: PHYSICIAN ASSISTANT

## 2023-06-26 PROCEDURE — G8420 CALC BMI NORM PARAMETERS: HCPCS | Performed by: PHYSICIAN ASSISTANT

## 2023-06-26 PROCEDURE — 99213 OFFICE O/P EST LOW 20 MIN: CPT | Performed by: PHYSICIAN ASSISTANT

## 2023-06-26 PROCEDURE — 1123F ACP DISCUSS/DSCN MKR DOCD: CPT | Performed by: PHYSICIAN ASSISTANT

## 2023-06-26 PROCEDURE — 1090F PRES/ABSN URINE INCON ASSESS: CPT | Performed by: PHYSICIAN ASSISTANT

## 2023-06-26 RX ORDER — FLUTICASONE PROPIONATE 50 MCG
2 SPRAY, SUSPENSION (ML) NASAL DAILY
Qty: 16 G | Refills: 5 | Status: SHIPPED | OUTPATIENT
Start: 2023-06-26

## 2023-06-26 RX ORDER — CETIRIZINE HYDROCHLORIDE 10 MG/1
10 TABLET ORAL DAILY
Qty: 30 TABLET | Refills: 5 | Status: SHIPPED | OUTPATIENT
Start: 2023-06-26

## 2023-06-26 SDOH — ECONOMIC STABILITY: FOOD INSECURITY: WITHIN THE PAST 12 MONTHS, YOU WORRIED THAT YOUR FOOD WOULD RUN OUT BEFORE YOU GOT MONEY TO BUY MORE.: NEVER TRUE

## 2023-06-26 SDOH — ECONOMIC STABILITY: FOOD INSECURITY: WITHIN THE PAST 12 MONTHS, THE FOOD YOU BOUGHT JUST DIDN'T LAST AND YOU DIDN'T HAVE MONEY TO GET MORE.: NEVER TRUE

## 2023-06-26 SDOH — ECONOMIC STABILITY: HOUSING INSECURITY
IN THE LAST 12 MONTHS, WAS THERE A TIME WHEN YOU DID NOT HAVE A STEADY PLACE TO SLEEP OR SLEPT IN A SHELTER (INCLUDING NOW)?: NO

## 2023-06-26 SDOH — ECONOMIC STABILITY: INCOME INSECURITY: HOW HARD IS IT FOR YOU TO PAY FOR THE VERY BASICS LIKE FOOD, HOUSING, MEDICAL CARE, AND HEATING?: NOT HARD AT ALL

## 2023-06-26 ASSESSMENT — PATIENT HEALTH QUESTIONNAIRE - PHQ9
SUM OF ALL RESPONSES TO PHQ QUESTIONS 1-9: 0
SUM OF ALL RESPONSES TO PHQ QUESTIONS 1-9: 0
1. LITTLE INTEREST OR PLEASURE IN DOING THINGS: 0
SUM OF ALL RESPONSES TO PHQ QUESTIONS 1-9: 0
SUM OF ALL RESPONSES TO PHQ9 QUESTIONS 1 & 2: 0
SUM OF ALL RESPONSES TO PHQ QUESTIONS 1-9: 0
2. FEELING DOWN, DEPRESSED OR HOPELESS: 0

## 2023-06-26 ASSESSMENT — LIFESTYLE VARIABLES
HOW MANY STANDARD DRINKS CONTAINING ALCOHOL DO YOU HAVE ON A TYPICAL DAY: PATIENT DOES NOT DRINK
HOW OFTEN DO YOU HAVE A DRINK CONTAINING ALCOHOL: NEVER

## 2023-07-05 RX ORDER — SPIRONOLACTONE 25 MG/1
TABLET ORAL
Qty: 30 TABLET | Refills: 3 | Status: SHIPPED | OUTPATIENT
Start: 2023-07-05

## 2023-07-12 ENCOUNTER — APPOINTMENT (OUTPATIENT)
Dept: OTHER | Age: 68
End: 2023-07-12
Payer: COMMERCIAL

## 2023-07-18 DIAGNOSIS — Z79.899 NEW MEDICATION ADDED: ICD-10-CM

## 2023-07-18 DIAGNOSIS — I10 PRIMARY HYPERTENSION: ICD-10-CM

## 2023-07-18 DIAGNOSIS — E78.00 PURE HYPERCHOLESTEROLEMIA: Primary | ICD-10-CM

## 2023-07-18 DIAGNOSIS — Z79.899 MEDICATION DOSE INCREASED: ICD-10-CM

## 2023-07-18 DIAGNOSIS — I50.9 CONGESTIVE HEART FAILURE, UNSPECIFIED HF CHRONICITY, UNSPECIFIED HEART FAILURE TYPE (HCC): ICD-10-CM

## 2023-07-18 DIAGNOSIS — J45.21 MILD INTERMITTENT ASTHMA WITH ACUTE EXACERBATION: ICD-10-CM

## 2023-07-18 DIAGNOSIS — J30.2 SEASONAL ALLERGIES: ICD-10-CM

## 2023-07-18 DIAGNOSIS — I42.9 CARDIOMYOPATHY, UNSPECIFIED TYPE (HCC): ICD-10-CM

## 2023-07-18 DIAGNOSIS — M54.41 ACUTE BILATERAL LOW BACK PAIN WITH RIGHT-SIDED SCIATICA: ICD-10-CM

## 2023-07-18 RX ORDER — ONDANSETRON 4 MG/1
4 TABLET, FILM COATED ORAL 3 TIMES DAILY PRN
Qty: 15 TABLET | Refills: 0 | Status: SHIPPED | OUTPATIENT
Start: 2023-07-18

## 2023-07-18 RX ORDER — BUMETANIDE 1 MG/1
TABLET ORAL
Qty: 30 TABLET | Refills: 0 | Status: SHIPPED | OUTPATIENT
Start: 2023-07-18

## 2023-07-18 RX ORDER — FLUTICASONE PROPIONATE 50 MCG
2 SPRAY, SUSPENSION (ML) NASAL DAILY
Qty: 16 G | Refills: 0 | Status: SHIPPED | OUTPATIENT
Start: 2023-07-18

## 2023-07-18 RX ORDER — CETIRIZINE HYDROCHLORIDE 10 MG/1
10 TABLET ORAL DAILY
Qty: 30 TABLET | Refills: 0 | Status: SHIPPED | OUTPATIENT
Start: 2023-07-18

## 2023-07-18 RX ORDER — CETIRIZINE HYDROCHLORIDE 10 MG/1
10 TABLET ORAL DAILY
Qty: 30 TABLET | Refills: 5 | Status: SHIPPED | OUTPATIENT
Start: 2023-07-18 | End: 2023-07-18 | Stop reason: SDUPTHER

## 2023-07-18 RX ORDER — ALLOPURINOL 100 MG/1
TABLET ORAL
Qty: 60 TABLET | Refills: 0 | Status: SHIPPED | OUTPATIENT
Start: 2023-07-18

## 2023-07-18 RX ORDER — SACUBITRIL AND VALSARTAN 97; 103 MG/1; MG/1
1 TABLET, FILM COATED ORAL 2 TIMES DAILY
Qty: 60 TABLET | Refills: 3 | Status: SHIPPED | OUTPATIENT
Start: 2023-07-18 | End: 2023-07-18 | Stop reason: SDUPTHER

## 2023-07-18 RX ORDER — SACUBITRIL AND VALSARTAN 97; 103 MG/1; MG/1
1 TABLET, FILM COATED ORAL 2 TIMES DAILY
Qty: 60 TABLET | Refills: 0 | Status: SHIPPED | OUTPATIENT
Start: 2023-07-18

## 2023-07-18 RX ORDER — BUMETANIDE 1 MG/1
TABLET ORAL
Qty: 30 TABLET | Refills: 3 | Status: SHIPPED | OUTPATIENT
Start: 2023-07-18 | End: 2023-07-18 | Stop reason: SDUPTHER

## 2023-07-18 RX ORDER — ONDANSETRON 4 MG/1
4 TABLET, FILM COATED ORAL 3 TIMES DAILY PRN
Qty: 15 TABLET | Refills: 0 | Status: SHIPPED | OUTPATIENT
Start: 2023-07-18 | End: 2023-07-18 | Stop reason: SDUPTHER

## 2023-07-18 RX ORDER — METOPROLOL SUCCINATE 100 MG/1
100 TABLET, EXTENDED RELEASE ORAL 2 TIMES DAILY
Qty: 60 TABLET | Refills: 3 | Status: SHIPPED | OUTPATIENT
Start: 2023-07-18 | End: 2023-07-18 | Stop reason: SDUPTHER

## 2023-07-18 RX ORDER — SPIRONOLACTONE 25 MG/1
TABLET ORAL
Qty: 30 TABLET | Refills: 3 | Status: SHIPPED | OUTPATIENT
Start: 2023-07-18 | End: 2023-07-18 | Stop reason: SDUPTHER

## 2023-07-18 RX ORDER — SPIRONOLACTONE 25 MG/1
TABLET ORAL
Qty: 30 TABLET | Refills: 0 | Status: SHIPPED | OUTPATIENT
Start: 2023-07-18

## 2023-07-18 RX ORDER — FLUTICASONE PROPIONATE 220 UG/1
AEROSOL, METERED RESPIRATORY (INHALATION)
Qty: 12 G | Refills: 0 | Status: SHIPPED | OUTPATIENT
Start: 2023-07-18

## 2023-07-18 RX ORDER — ALLOPURINOL 100 MG/1
TABLET ORAL
Qty: 60 TABLET | Refills: 2 | Status: SHIPPED | OUTPATIENT
Start: 2023-07-18 | End: 2023-07-18

## 2023-07-18 RX ORDER — MELOXICAM 7.5 MG/1
7.5 TABLET ORAL DAILY PRN
Qty: 30 TABLET | Refills: 0 | Status: SHIPPED | OUTPATIENT
Start: 2023-07-18

## 2023-07-18 RX ORDER — ASPIRIN 81 MG/1
81 TABLET ORAL DAILY
Qty: 30 TABLET | Refills: 0 | Status: SHIPPED | OUTPATIENT
Start: 2023-07-18

## 2023-07-18 RX ORDER — ATORVASTATIN CALCIUM 40 MG/1
TABLET, FILM COATED ORAL
Qty: 30 TABLET | Refills: 0 | Status: SHIPPED | OUTPATIENT
Start: 2023-07-18

## 2023-07-18 RX ORDER — FLUTICASONE PROPIONATE 50 MCG
2 SPRAY, SUSPENSION (ML) NASAL DAILY
Qty: 16 G | Refills: 5 | Status: SHIPPED | OUTPATIENT
Start: 2023-07-18 | End: 2023-07-18 | Stop reason: SDUPTHER

## 2023-07-18 RX ORDER — ASPIRIN 81 MG/1
81 TABLET ORAL DAILY
Qty: 30 TABLET | Refills: 5 | Status: SHIPPED | OUTPATIENT
Start: 2023-07-18 | End: 2023-07-18

## 2023-07-18 RX ORDER — ATORVASTATIN CALCIUM 40 MG/1
TABLET, FILM COATED ORAL
Qty: 30 TABLET | Refills: 2 | Status: SHIPPED | OUTPATIENT
Start: 2023-07-18 | End: 2023-07-18 | Stop reason: SDUPTHER

## 2023-07-18 RX ORDER — METOPROLOL SUCCINATE 100 MG/1
100 TABLET, EXTENDED RELEASE ORAL 2 TIMES DAILY
Qty: 60 TABLET | Refills: 0 | Status: SHIPPED | OUTPATIENT
Start: 2023-07-18

## 2023-07-18 NOTE — TELEPHONE ENCOUNTER
Pt called in states she is out of all of her medication and no one called her to inform her of that.

## 2023-07-20 ENCOUNTER — TELEPHONE (OUTPATIENT)
Dept: PRIMARY CARE CLINIC | Age: 68
End: 2023-07-20

## 2023-07-20 NOTE — TELEPHONE ENCOUNTER
Brittany Collier,  with [facility] has received a request from pt for a lift chair in home, for this to become possible pt needs PT evaluation in home to prove necessity, need, and safety for this particular request. Please order physical therapy in home for pt and I will fax the order to [facility] at [phone]

## 2023-07-25 ENCOUNTER — HOSPITAL ENCOUNTER (OUTPATIENT)
Dept: OTHER | Age: 68
Setting detail: THERAPIES SERIES
Discharge: HOME | End: 2023-07-25
Payer: COMMERCIAL

## 2023-07-25 VITALS
DIASTOLIC BLOOD PRESSURE: 55 MMHG | BODY MASS INDEX: 20.63 KG/M2 | WEIGHT: 124 LBS | OXYGEN SATURATION: 98 % | RESPIRATION RATE: 18 BRPM | SYSTOLIC BLOOD PRESSURE: 125 MMHG | HEART RATE: 65 BPM

## 2023-07-25 DIAGNOSIS — Z79.899 NEW MEDICATION ADDED: ICD-10-CM

## 2023-07-25 DIAGNOSIS — E78.00 PURE HYPERCHOLESTEROLEMIA: ICD-10-CM

## 2023-07-25 LAB
ANION GAP SERPL CALCULATED.3IONS-SCNC: 8 MMOL/L (ref 7–16)
BNP SERPL-MCNC: 741 PG/ML (ref 0–125)
BUN SERPL-MCNC: 44 MG/DL (ref 6–23)
CALCIUM SERPL-MCNC: 9.4 MG/DL (ref 8.6–10.2)
CHLORIDE SERPL-SCNC: 109 MMOL/L (ref 98–107)
CO2 SERPL-SCNC: 24 MMOL/L (ref 22–29)
CREAT SERPL-MCNC: 1.7 MG/DL (ref 0.5–1)
GFR SERPL CREATININE-BSD FRML MDRD: 33 ML/MIN/1.73M2
GLUCOSE SERPL-MCNC: 98 MG/DL (ref 74–99)
POTASSIUM SERPL-SCNC: 4.2 MMOL/L (ref 3.5–5)
SODIUM SERPL-SCNC: 141 MMOL/L (ref 132–146)

## 2023-07-25 PROCEDURE — 99214 OFFICE O/P EST MOD 30 MIN: CPT

## 2023-07-25 PROCEDURE — 83880 ASSAY OF NATRIURETIC PEPTIDE: CPT

## 2023-07-25 PROCEDURE — 80048 BASIC METABOLIC PNL TOTAL CA: CPT

## 2023-07-25 ASSESSMENT — PATIENT HEALTH QUESTIONNAIRE - PHQ9
2. FEELING DOWN, DEPRESSED OR HOPELESS: NOT AT ALL
1. LITTLE INTEREST OR PLEASURE IN DOING THINGS: NOT AT ALL
SUM OF ALL RESPONSES TO PHQ9 QUESTIONS 1 & 2: 0

## 2023-07-25 NOTE — PLAN OF CARE
Problem: Chronic Conditions and Co-morbidities  Goal: Patient's chronic conditions and co-morbidity symptoms are monitored and maintained or improved  Flowsheets (Taken 7/25/2023 2591)  Care Plan - Patient's Chronic Conditions and Co-Morbidity Symptoms are Monitored and Maintained or Improved: Monitor and assess patient's chronic conditions and comorbid symptoms for stability, deterioration, or improvement

## 2023-07-25 NOTE — PROGRESS NOTES
Congestive Heart Failure [id number] [facility]   1955          Referring Provider:   Primary Care Physician: Oscar Arrieta   Cardiologist: Dr Poonam Whyte  Nephrologist:N/A        History of Present Illness:     Mara Griffith is a 79 y.o. female with a history of HFrEF, most recent EF 35-40% on 7-18-22. Patient Story:    She denies  dyspnea with exertion, shortness of breath, or decline in overall functional capacity. She does remain a current smoker. She does not have orthopnea, PND, nocturnal cough or hemoptysis. She does not have abdominal distention or bloating, early satiety, anorexia/change in appetite. She does have a good urinary response to oral diuretic. She does not have lower extremity edema. She denies lightheadedness, dizziness. She denies palpitations, syncope or near syncope. She does not complain of chest pain, pressure, discomfort. Allergies   Allergen Reactions    Pcn [Penicillins]            Prior to Visit Medications    Medication Sig Taking? Authorizing Provider   allopurinol (ZYLOPRIM) 100 MG tablet TAKE 1 TABLET TWO (2) TIMES A DAY. Giorgio Caal MD   aspirin (ASPIRIN LOW DOSE) 81 MG EC tablet Take 1 tablet by mouth daily  Giorgio Caal MD   atorvastatin (LIPITOR) 40 MG tablet TAKE ONE (1) TABLET DAILY. Giorgio Caal MD   bumetanide (BUMEX) 1 MG tablet TAKE ONE (1) TABLET DAILY. Giorgio Caal MD   cetirizine (ZYRTEC) 10 MG tablet Take 1 tablet by mouth daily  Giorgio Caal MD   empagliflozin (JARDIANCE) 10 MG tablet Take 1 tablet by mouth daily  Giorgio Caal MD   fluticasone (FLOVENT HFA) 220 MCG/ACT inhaler INHALE (2) PUFFS INTO THE LUNGS (2) TIMES A DAY AS DIRECTED.   Giorgio Caal MD   fluticasone Mac Amend) 50 MCG/ACT nasal spray 2 sprays by Nasal route daily  Giorgio Caal MD   meloxicam (MOBIC) 7.5 MG tablet Take 1 tablet by mouth daily as needed for Pain  Giorgio Caal MD   metoprolol succinate

## 2023-07-26 RX ORDER — ALLOPURINOL 100 MG/1
TABLET ORAL
Qty: 60 TABLET | Refills: 2 | Status: SHIPPED | OUTPATIENT
Start: 2023-07-26

## 2023-07-26 RX ORDER — ATORVASTATIN CALCIUM 40 MG/1
TABLET, FILM COATED ORAL
Qty: 30 TABLET | Refills: 2 | Status: SHIPPED | OUTPATIENT
Start: 2023-07-26

## 2023-08-09 DIAGNOSIS — I50.9 CONGESTIVE HEART FAILURE, UNSPECIFIED HF CHRONICITY, UNSPECIFIED HEART FAILURE TYPE (HCC): ICD-10-CM

## 2023-08-09 DIAGNOSIS — J30.2 SEASONAL ALLERGIES: ICD-10-CM

## 2023-08-09 RX ORDER — BUMETANIDE 1 MG/1
TABLET ORAL
Qty: 30 TABLET | Refills: 0 | Status: SHIPPED | OUTPATIENT
Start: 2023-08-09

## 2023-08-09 RX ORDER — FLUTICASONE PROPIONATE 220 UG/1
AEROSOL, METERED RESPIRATORY (INHALATION)
Qty: 12 G | Refills: 0 | Status: SHIPPED | OUTPATIENT
Start: 2023-08-09

## 2023-08-09 RX ORDER — ASPIRIN 81 MG/1
TABLET, COATED ORAL
Qty: 30 TABLET | Refills: 0 | Status: SHIPPED | OUTPATIENT
Start: 2023-08-09

## 2023-08-12 DIAGNOSIS — I50.9 CONGESTIVE HEART FAILURE, UNSPECIFIED HF CHRONICITY, UNSPECIFIED HEART FAILURE TYPE (HCC): ICD-10-CM

## 2023-08-14 DIAGNOSIS — I50.9 CONGESTIVE HEART FAILURE, UNSPECIFIED HF CHRONICITY, UNSPECIFIED HEART FAILURE TYPE (HCC): ICD-10-CM

## 2023-08-14 DIAGNOSIS — Z79.899 MEDICATION DOSE INCREASED: ICD-10-CM

## 2023-08-15 DIAGNOSIS — I42.9 CARDIOMYOPATHY, UNSPECIFIED TYPE (HCC): ICD-10-CM

## 2023-08-15 DIAGNOSIS — M54.41 ACUTE BILATERAL LOW BACK PAIN WITH RIGHT-SIDED SCIATICA: ICD-10-CM

## 2023-08-15 DIAGNOSIS — Z79.899 NEW MEDICATION ADDED: ICD-10-CM

## 2023-08-15 DIAGNOSIS — I50.9 CONGESTIVE HEART FAILURE, UNSPECIFIED HF CHRONICITY, UNSPECIFIED HEART FAILURE TYPE (HCC): ICD-10-CM

## 2023-08-15 DIAGNOSIS — E78.00 PURE HYPERCHOLESTEROLEMIA: ICD-10-CM

## 2023-08-15 DIAGNOSIS — J30.2 SEASONAL ALLERGIES: ICD-10-CM

## 2023-08-15 RX ORDER — SPIRONOLACTONE 25 MG/1
TABLET ORAL
Qty: 30 TABLET | Refills: 0 | Status: SHIPPED | OUTPATIENT
Start: 2023-08-15

## 2023-08-15 RX ORDER — CETIRIZINE HYDROCHLORIDE 10 MG/1
TABLET ORAL
Qty: 30 TABLET | Refills: 0 | Status: SHIPPED | OUTPATIENT
Start: 2023-08-15

## 2023-08-15 RX ORDER — METOPROLOL SUCCINATE 100 MG/1
TABLET, EXTENDED RELEASE ORAL
Qty: 60 TABLET | Refills: 0 | Status: SHIPPED | OUTPATIENT
Start: 2023-08-15

## 2023-08-15 RX ORDER — ONDANSETRON 4 MG/1
TABLET, FILM COATED ORAL
Qty: 15 TABLET | Refills: 0 | Status: SHIPPED | OUTPATIENT
Start: 2023-08-15

## 2023-08-15 RX ORDER — ATORVASTATIN CALCIUM 40 MG/1
TABLET, FILM COATED ORAL
Qty: 30 TABLET | Refills: 2 | Status: SHIPPED | OUTPATIENT
Start: 2023-08-15

## 2023-08-15 RX ORDER — ASPIRIN 81 MG/1
TABLET, COATED ORAL
Qty: 30 TABLET | Refills: 0 | Status: SHIPPED | OUTPATIENT
Start: 2023-08-15

## 2023-08-15 RX ORDER — SACUBITRIL AND VALSARTAN 97; 103 MG/1; MG/1
TABLET, FILM COATED ORAL
Qty: 60 TABLET | Refills: 0 | Status: SHIPPED | OUTPATIENT
Start: 2023-08-15

## 2023-08-15 RX ORDER — ALLOPURINOL 100 MG/1
TABLET ORAL
Qty: 60 TABLET | Refills: 2 | Status: SHIPPED | OUTPATIENT
Start: 2023-08-15

## 2023-08-15 RX ORDER — BUMETANIDE 1 MG/1
TABLET ORAL
Qty: 30 TABLET | Refills: 0 | Status: SHIPPED | OUTPATIENT
Start: 2023-08-15

## 2023-08-15 RX ORDER — FLUTICASONE PROPIONATE 220 UG/1
AEROSOL, METERED RESPIRATORY (INHALATION)
Qty: 12 G | Refills: 0 | Status: SHIPPED | OUTPATIENT
Start: 2023-08-15

## 2023-08-15 RX ORDER — MELOXICAM 7.5 MG/1
TABLET ORAL
Qty: 30 TABLET | Refills: 0 | Status: SHIPPED | OUTPATIENT
Start: 2023-08-15

## 2023-08-15 RX ORDER — SPIRONOLACTONE 25 MG/1
TABLET ORAL
Qty: 30 TABLET | Refills: 0 | OUTPATIENT
Start: 2023-08-15

## 2023-08-15 RX ORDER — FLUTICASONE PROPIONATE 50 MCG
SPRAY, SUSPENSION (ML) NASAL
Qty: 16 G | Refills: 0 | Status: SHIPPED | OUTPATIENT
Start: 2023-08-15

## 2023-09-12 DIAGNOSIS — J30.2 SEASONAL ALLERGIES: ICD-10-CM

## 2023-09-12 DIAGNOSIS — I50.9 CONGESTIVE HEART FAILURE, UNSPECIFIED HF CHRONICITY, UNSPECIFIED HEART FAILURE TYPE (HCC): ICD-10-CM

## 2023-09-12 RX ORDER — SPIRONOLACTONE 25 MG/1
TABLET ORAL
Qty: 30 TABLET | Refills: 0 | Status: SHIPPED | OUTPATIENT
Start: 2023-09-12

## 2023-09-12 RX ORDER — CETIRIZINE HYDROCHLORIDE 10 MG/1
TABLET ORAL
Qty: 30 TABLET | Refills: 0 | Status: SHIPPED | OUTPATIENT
Start: 2023-09-12

## 2023-09-19 ENCOUNTER — OFFICE VISIT (OUTPATIENT)
Dept: NEUROLOGY | Age: 68
End: 2023-09-19
Payer: MEDICARE

## 2023-09-19 VITALS
OXYGEN SATURATION: 100 % | BODY MASS INDEX: 19.46 KG/M2 | HEART RATE: 80 BPM | HEIGHT: 67 IN | WEIGHT: 124 LBS | SYSTOLIC BLOOD PRESSURE: 120 MMHG | DIASTOLIC BLOOD PRESSURE: 65 MMHG

## 2023-09-19 DIAGNOSIS — F15.10 CAFFEINE ABUSE (HCC): ICD-10-CM

## 2023-09-19 DIAGNOSIS — G25.0 ESSENTIAL TREMOR: Primary | ICD-10-CM

## 2023-09-19 PROCEDURE — 1123F ACP DISCUSS/DSCN MKR DOCD: CPT | Performed by: NURSE PRACTITIONER

## 2023-09-19 PROCEDURE — 3074F SYST BP LT 130 MM HG: CPT | Performed by: NURSE PRACTITIONER

## 2023-09-19 PROCEDURE — 99203 OFFICE O/P NEW LOW 30 MIN: CPT | Performed by: NURSE PRACTITIONER

## 2023-09-19 PROCEDURE — 3078F DIAST BP <80 MM HG: CPT | Performed by: NURSE PRACTITIONER

## 2023-09-19 RX ORDER — PRIMIDONE 50 MG/1
25 TABLET ORAL NIGHTLY
Qty: 15 TABLET | Refills: 0 | Status: SHIPPED | OUTPATIENT
Start: 2023-09-19 | End: 2023-10-19

## 2023-09-19 NOTE — PROGRESS NOTES
reflex 1+  Left Achilles reflex 1+    No Babinskis  No Perez's    No other pathological reflexes    Laboratory/Radiology:     CBC with Differential:    Lab Results   Component Value Date/Time    WBC 8.4 12/11/2022 09:39 PM    RBC 4.24 12/11/2022 09:39 PM    HGB 14.4 12/11/2022 09:39 PM    HCT 43.4 12/11/2022 09:39 PM     12/11/2022 09:39 PM    .4 12/11/2022 09:39 PM    MCH 34.0 12/11/2022 09:39 PM    MCHC 33.2 12/11/2022 09:39 PM    RDW 13.1 12/11/2022 09:39 PM    SEGSPCT 52 07/29/2013 12:40 AM    LYMPHOPCT 22.8 12/11/2022 09:39 PM    MONOPCT 14.8 12/11/2022 09:39 PM    BASOPCT 0.4 12/11/2022 09:39 PM    MONOSABS 1.24 12/11/2022 09:39 PM    LYMPHSABS 1.91 12/11/2022 09:39 PM    EOSABS 0.00 12/11/2022 09:39 PM    BASOSABS 0.03 12/11/2022 09:39 PM     CMP:    Lab Results   Component Value Date/Time     07/25/2023 08:30 AM    K 4.2 07/25/2023 08:30 AM    K 4.1 05/24/2022 10:05 AM     07/25/2023 08:30 AM    CO2 24 07/25/2023 08:30 AM    BUN 44 07/25/2023 08:30 AM    CREATININE 1.7 07/25/2023 08:30 AM    GFRAA >60 08/12/2022 12:15 PM    LABGLOM 33 07/25/2023 08:30 AM    GLUCOSE 98 07/25/2023 08:30 AM    PROT 8.1 12/11/2022 09:39 PM    LABALBU 3.8 12/11/2022 09:39 PM    CALCIUM 9.4 07/25/2023 08:30 AM    BILITOT 0.6 12/11/2022 09:39 PM    ALKPHOS 73 12/11/2022 09:39 PM    AST 26 12/11/2022 09:39 PM    ALT 23 12/11/2022 09:39 PM       All pertinent labs were personally reviewed at the time of this visit, no neuro imaging to review today    Assessment:     Essential tremor   Very minimal tremor noted today although patient has not started her day yet as far as Pepsi, medications or other activities   These began after CHF diagnosis and I suspect due to medications such as albuterol and patient's excessive caffeine exacerbates this. Although patient denies seeing worsening throughout the day. Patient says they more active at times but she has not aware of what triggers these.   Handwriting has

## 2023-09-24 DIAGNOSIS — I42.9 CARDIOMYOPATHY, UNSPECIFIED TYPE (HCC): ICD-10-CM

## 2023-09-25 RX ORDER — METOPROLOL SUCCINATE 100 MG/1
TABLET, EXTENDED RELEASE ORAL
Qty: 60 TABLET | Refills: 0 | Status: SHIPPED | OUTPATIENT
Start: 2023-09-25

## 2023-09-27 DIAGNOSIS — Z79.899 MEDICATION DOSE INCREASED: ICD-10-CM

## 2023-09-27 DIAGNOSIS — I50.9 CONGESTIVE HEART FAILURE, UNSPECIFIED HF CHRONICITY, UNSPECIFIED HEART FAILURE TYPE (HCC): ICD-10-CM

## 2023-09-28 RX ORDER — SACUBITRIL AND VALSARTAN 97; 103 MG/1; MG/1
TABLET, FILM COATED ORAL
Qty: 60 TABLET | Refills: 0 | Status: SHIPPED | OUTPATIENT
Start: 2023-09-28

## 2023-10-10 ENCOUNTER — TELEPHONE (OUTPATIENT)
Dept: PRIMARY CARE CLINIC | Age: 68
End: 2023-10-10

## 2023-10-10 DIAGNOSIS — J30.2 SEASONAL ALLERGIES: ICD-10-CM

## 2023-10-10 DIAGNOSIS — I50.9 CONGESTIVE HEART FAILURE, UNSPECIFIED HF CHRONICITY, UNSPECIFIED HEART FAILURE TYPE (HCC): ICD-10-CM

## 2023-10-10 NOTE — TELEPHONE ENCOUNTER
----- Message from Kenia Gamble sent at 10/10/2023  2:58 PM EDT -----  Subject: Message to Provider    QUESTIONS  Information for Provider? Patient is needing a medical supply of checks so   she can put them on her bed, patient is wanting a larger supply. Please   call patient back to discuss. ---------------------------------------------------------------------------  --------------  Yahaira Rodríguez XXKH  [phone]; OK to leave message on voicemail,OK to respond with electronic   message via [employer] portal (only for patients who have registered [employer]   account)  ---------------------------------------------------------------------------  --------------  SCRIPT ANSWERS  Relationship to Patient?  Self

## 2023-10-10 NOTE — TELEPHONE ENCOUNTER
patients who have registered [employer] account)  Preferred Call Back Phone Number? [phone]  ---------------------------------------------------------------------------  --------------  SCRIPT ANSWERS  Relationship to Patient?  Self

## 2023-10-10 NOTE — TELEPHONE ENCOUNTER
Phoned pt she states she needs to have 30 day supply of chux pads she uses 1 q hs, but is only getting a supply of 8-12. Also she does not want the protective under ware she can not tolerate they irritate pt. Phoned [employer] for pt spoke with rep Yadiel Lange she will up date pt  chart and remove undergarment. The pt will have to call regarding chux pads because there system shows she gets 8 packs of 5 pads per pack total 40 pads monthly, if pt is not receiving the correct amount they will have to verify and will correct.

## 2023-10-11 RX ORDER — ASPIRIN 81 MG/1
81 TABLET ORAL DAILY
Qty: 30 TABLET | Refills: 3 | Status: SHIPPED | OUTPATIENT
Start: 2023-10-11

## 2023-10-11 RX ORDER — CETIRIZINE HYDROCHLORIDE 10 MG/1
10 TABLET ORAL DAILY
Qty: 30 TABLET | Refills: 3 | Status: SHIPPED | OUTPATIENT
Start: 2023-10-11

## 2023-10-11 RX ORDER — BUMETANIDE 1 MG/1
1 TABLET ORAL DAILY
Qty: 30 TABLET | Refills: 3 | Status: SHIPPED | OUTPATIENT
Start: 2023-10-11

## 2023-10-11 RX ORDER — SPIRONOLACTONE 25 MG/1
25 TABLET ORAL DAILY
Qty: 30 TABLET | Refills: 3 | Status: SHIPPED | OUTPATIENT
Start: 2023-10-11

## 2023-10-13 RX ORDER — PRIMIDONE 50 MG/1
25 TABLET ORAL NIGHTLY
Qty: 45 TABLET | Refills: 0 | Status: SHIPPED | OUTPATIENT
Start: 2023-10-13 | End: 2024-01-11

## 2023-10-24 ENCOUNTER — TELEPHONE (OUTPATIENT)
Dept: OTHER | Age: 68
End: 2023-10-24

## 2023-10-24 ENCOUNTER — HOSPITAL ENCOUNTER (OUTPATIENT)
Dept: OTHER | Age: 68
Setting detail: THERAPIES SERIES
Discharge: HOME | End: 2023-10-24
Payer: MEDICARE

## 2023-10-24 VITALS
DIASTOLIC BLOOD PRESSURE: 65 MMHG | SYSTOLIC BLOOD PRESSURE: 160 MMHG | WEIGHT: 127 LBS | BODY MASS INDEX: 19.89 KG/M2 | RESPIRATION RATE: 18 BRPM | OXYGEN SATURATION: 96 % | HEART RATE: 67 BPM

## 2023-10-24 DIAGNOSIS — Z13.9 ENCOUNTER FOR SCREENING INVOLVING SOCIAL DETERMINANTS OF HEALTH (SDOH): Primary | ICD-10-CM

## 2023-10-24 DIAGNOSIS — Z59.86 FINANCIAL INSECURITY: ICD-10-CM

## 2023-10-24 LAB
ANION GAP SERPL CALCULATED.3IONS-SCNC: 7 MMOL/L (ref 7–16)
BNP SERPL-MCNC: 1659 PG/ML (ref 0–125)
BUN SERPL-MCNC: 17 MG/DL (ref 6–23)
CALCIUM SERPL-MCNC: 9.5 MG/DL (ref 8.6–10.2)
CHLORIDE SERPL-SCNC: 105 MMOL/L (ref 98–107)
CO2 SERPL-SCNC: 29 MMOL/L (ref 22–29)
CREAT SERPL-MCNC: 1.3 MG/DL (ref 0.5–1)
GFR SERPL CREATININE-BSD FRML MDRD: 44 ML/MIN/1.73M2
GLUCOSE SERPL-MCNC: 87 MG/DL (ref 74–99)
POTASSIUM SERPL-SCNC: 4.3 MMOL/L (ref 3.5–5)
SODIUM SERPL-SCNC: 141 MMOL/L (ref 132–146)

## 2023-10-24 PROCEDURE — 80048 BASIC METABOLIC PNL TOTAL CA: CPT

## 2023-10-24 PROCEDURE — 36415 COLL VENOUS BLD VENIPUNCTURE: CPT

## 2023-10-24 PROCEDURE — 99214 OFFICE O/P EST MOD 30 MIN: CPT

## 2023-10-24 PROCEDURE — 83880 ASSAY OF NATRIURETIC PEPTIDE: CPT

## 2023-10-24 SDOH — ECONOMIC STABILITY - INCOME SECURITY: FINANCIAL INSECURITY: Z59.86

## 2023-10-24 ASSESSMENT — PATIENT HEALTH QUESTIONNAIRE - PHQ9
SUM OF ALL RESPONSES TO PHQ9 QUESTIONS 1 & 2: 0
1. LITTLE INTEREST OR PLEASURE IN DOING THINGS: NOT AT ALL
2. FEELING DOWN, DEPRESSED OR HOPELESS: NOT AT ALL

## 2023-10-24 NOTE — FLOWSHEET NOTE
10/24/23 0818   Over the past 2 weeks, how often have you been bothered by any of the following problems?    Little interest or pleasure in doing things Not at all   Feeling down, depressed, or hopeless Not at all   Patient Health Questionnaire-2 Score 0

## 2023-10-24 NOTE — TELEPHONE ENCOUNTER
CHF CHW Initial Call  10/24/2023  2:47 PM    CHW received referral from Justin Jimenez RN. Patient need: Assistance with housing and paying rent. Notes: CHW met with patient to assist with financial insecurity. She stated that she reached out to [employer] and they told her they can help if she has documentation of having Covid. She has also applied for section 8, but hasn't heard anything about it. I told her I can see if there are any other resources for her out there. Follow up plan: I will call pt with information I find. Next anticipated outreach: 10/25/23          Patient in agreement with plan and further assistance. [x] Agreed    [] Declined      CHW informed patient of the services and resources that can be provided to them. CHW instructed patient to call CHF CHW at [phone] for any future assistance.      Electronically signed by Sandra Barbour RN on 10/24/2023 at 2:47 PM

## 2023-10-24 NOTE — PROGRESS NOTES
Congestive Heart Failure [id number] [facility]       Referring Provider: Ken White  Primary Care Physician: Lamin Quintero PA-C   Cardiologist: Alvina Valdez  Nephrologist: n/a      HISTORY OF PRESENT ILLNESS:     Aleta Calvillo is a 79 y.o. (1955) female with a history of HFrEF (EF < 40%), most recent EF:  Lab Results   Component Value Date    LVEF 38 07/18/2022         She presents to the CHF clinic for ongoing evaluation and monitoring of heart failure. In the CHF clinic today she denies any adverse symptoms except:  [] Dizziness or lightheadedness   [] Syncope or near syncope  [] Recent Fall  [] Shortness of breath at rest   [] Dyspnea with exertion  [] Decline in functional capacity (unable to perform activities they had previously been able to do)  [] Fatigue   [] Orthopnea  [] PND  [] Nocturnal cough  [] Hemoptysis  [] Chest pain, pressure, or discomfort  [] Palpitations  [] Abdominal distention  [] Abdominal bloating  [] Early satiety  [] Blood in stool   [] Diarrhea  [] Constipation  [] Nausea/Vomiting  [] Decreased urinary response to oral diuretic   [] Scrotal swelling   [] Lower extremity edema  [] Used PRN doses of oral diuretic   [] Weight gain    Wt Readings from Last 3 Encounters:   10/24/23 57.6 kg (127 lb)   09/19/23 56.2 kg (124 lb)   07/25/23 56.2 kg (124 lb)           SOCIAL HISTORY:  [x] Denies tobacco, alcohol or illicit drug abuse  [] Tobacco use:  [] ETOH use:  [] Illicit drug use:        MEDICATIONS:    Allergies   Allergen Reactions    Pcn [Penicillins]      Prior to Visit Medications    Medication Sig Taking?  Authorizing Provider   primidone (MYSOLINE) 50 MG tablet Take 0.5 tablets by mouth nightly  Deandre AILEEN Red NP   cetirizine (ZYRTEC) 10 MG tablet Take 1 tablet by mouth daily  Haris ORDONEZ PA-C   spironolactone (ALDACTONE) 25 MG tablet Take 1 tablet by mouth daily  Haris ORDONEZ PA-C   bumetanide (BUMEX) 1 MG tablet Take 1 tablet

## 2023-10-24 NOTE — PLAN OF CARE
Problem: Chronic Conditions and Co-morbidities  Goal: Patient's chronic conditions and co-morbidity symptoms are monitored and maintained or improved  Flowsheets (Taken 10/24/2023 0409)  Care Plan - Patient's Chronic Conditions and Co-Morbidity Symptoms are Monitored and Maintained or Improved: Monitor and assess patient's chronic conditions and comorbid symptoms for stability, deterioration, or improvement

## 2023-10-26 NOTE — TELEPHONE ENCOUNTER
CHF CHW Follow up Call  10/26/2023  10:06 AM     Notes:  Called pt to discuss information about . Pt did not answer I left a VM stating that I attempted to get information about financial programs for rent but was unsuccessful. They would like to talk to her directly. I provided their contact information along with mine. Follow up plan: I will anticipate a call back from pt. CHW instructed patient to call [facility] CHW at [phone] for further assistance.     Electronically signed by Mauricio Ocampo RN on 10/26/2023 at 10:06 AM

## 2023-10-26 NOTE — TELEPHONE ENCOUNTER
CHF CHW Follow up Call  10/26/2023  1:50 PM     Notes: pt called me back and said she left a message for CM [employer]. She is hoping they get a hold of her before tomorrow. CHW instructed patient to call CHF CHW at [phone] for further assistance.     Electronically signed by Lala Bergman RN on 10/26/2023 at 1:50 PM

## 2023-11-02 DIAGNOSIS — Z79.899 MEDICATION DOSE INCREASED: ICD-10-CM

## 2023-11-02 DIAGNOSIS — J45.21 MILD INTERMITTENT ASTHMA WITH ACUTE EXACERBATION: ICD-10-CM

## 2023-11-02 DIAGNOSIS — J30.2 SEASONAL ALLERGIES: ICD-10-CM

## 2023-11-02 DIAGNOSIS — M54.41 ACUTE BILATERAL LOW BACK PAIN WITH RIGHT-SIDED SCIATICA: ICD-10-CM

## 2023-11-02 DIAGNOSIS — I50.9 CONGESTIVE HEART FAILURE, UNSPECIFIED HF CHRONICITY, UNSPECIFIED HEART FAILURE TYPE (HCC): ICD-10-CM

## 2023-11-02 DIAGNOSIS — I42.9 CARDIOMYOPATHY, UNSPECIFIED TYPE (HCC): ICD-10-CM

## 2023-11-02 DIAGNOSIS — I10 PRIMARY HYPERTENSION: ICD-10-CM

## 2023-11-02 DIAGNOSIS — E78.00 PURE HYPERCHOLESTEROLEMIA: ICD-10-CM

## 2023-11-02 DIAGNOSIS — Z79.899 NEW MEDICATION ADDED: ICD-10-CM

## 2023-11-02 RX ORDER — SACUBITRIL AND VALSARTAN 97; 103 MG/1; MG/1
1 TABLET, FILM COATED ORAL 2 TIMES DAILY
Qty: 60 TABLET | Refills: 0 | Status: SHIPPED | OUTPATIENT
Start: 2023-11-02

## 2023-11-02 RX ORDER — SPIRONOLACTONE 25 MG/1
25 TABLET ORAL DAILY
Qty: 30 TABLET | Refills: 3 | Status: SHIPPED | OUTPATIENT
Start: 2023-11-02

## 2023-11-02 RX ORDER — CETIRIZINE HYDROCHLORIDE 10 MG/1
10 TABLET ORAL DAILY
Qty: 30 TABLET | Refills: 3 | Status: SHIPPED | OUTPATIENT
Start: 2023-11-02

## 2023-11-02 RX ORDER — ALBUTEROL SULFATE 90 UG/1
2 AEROSOL, METERED RESPIRATORY (INHALATION) EVERY 4 HOURS PRN
Qty: 6.7 G | Refills: 2 | Status: SHIPPED | OUTPATIENT
Start: 2023-11-02

## 2023-11-02 RX ORDER — METOPROLOL SUCCINATE 100 MG/1
100 TABLET, EXTENDED RELEASE ORAL 2 TIMES DAILY
Qty: 60 TABLET | Refills: 2 | Status: SHIPPED | OUTPATIENT
Start: 2023-11-02

## 2023-11-02 RX ORDER — MEDICAL SUPPLY, MISCELLANEOUS
1 EACH MISCELLANEOUS DAILY
Qty: 1 EACH | Refills: 0 | OUTPATIENT
Start: 2023-11-02

## 2023-11-02 RX ORDER — ATORVASTATIN CALCIUM 40 MG/1
TABLET, FILM COATED ORAL
Qty: 30 TABLET | Refills: 2 | Status: SHIPPED | OUTPATIENT
Start: 2023-11-02

## 2023-11-02 RX ORDER — BUMETANIDE 1 MG/1
1 TABLET ORAL DAILY
Qty: 30 TABLET | Refills: 3 | Status: SHIPPED | OUTPATIENT
Start: 2023-11-02

## 2023-11-02 RX ORDER — ONDANSETRON 4 MG/1
4 TABLET, FILM COATED ORAL EVERY 12 HOURS PRN
Qty: 15 TABLET | Refills: 2 | Status: SHIPPED | OUTPATIENT
Start: 2023-11-02

## 2023-11-02 RX ORDER — MELOXICAM 7.5 MG/1
7.5 TABLET ORAL DAILY
Qty: 30 TABLET | Refills: 2 | Status: SHIPPED | OUTPATIENT
Start: 2023-11-02

## 2023-11-02 RX ORDER — FLUTICASONE PROPIONATE 220 UG/1
AEROSOL, METERED RESPIRATORY (INHALATION)
Qty: 12 G | Refills: 2 | Status: SHIPPED | OUTPATIENT
Start: 2023-11-02

## 2023-11-02 RX ORDER — ASPIRIN 81 MG/1
81 TABLET ORAL DAILY
Qty: 30 TABLET | Refills: 3 | Status: SHIPPED | OUTPATIENT
Start: 2023-11-02

## 2023-11-02 RX ORDER — FLUTICASONE PROPIONATE 50 MCG
2 SPRAY, SUSPENSION (ML) NASAL DAILY
Qty: 16 G | Refills: 2 | Status: SHIPPED | OUTPATIENT
Start: 2023-11-02

## 2023-11-02 RX ORDER — ALLOPURINOL 100 MG/1
TABLET ORAL
Qty: 60 TABLET | Refills: 2 | Status: SHIPPED | OUTPATIENT
Start: 2023-11-02

## 2023-11-02 RX ORDER — PRIMIDONE 50 MG/1
25 TABLET ORAL NIGHTLY
Qty: 45 TABLET | Refills: 0 | OUTPATIENT
Start: 2023-11-02 | End: 2024-01-31

## 2023-11-13 NOTE — TELEPHONE ENCOUNTER
----- Message from Rajendra Araujo sent at 6/10/2022  9:51 AM EDT -----  Subject: Refill Request    QUESTIONS  Name of Medication? spironolactone (ALDACTONE) 25 MG tablet  Patient-reported dosage and instructions? 25 MG one tablet daily   How many days do you have left? 7  Preferred Pharm[facility]? [facility] phone number (if available)? [phone]  ---------------------------------------------------------------------------  --------------  CALL BACK INFO  What is the best way for the office to contact you? OK to leave message on   voicemail  Preferred Call Back Phone Number? [phone]  ---------------------------------------------------------------------------  --------------  SCRIPT ANSWERS  Relationship to Patient?  Self Per Socorro...  Can you let the patient know we can review his schedule when I see him next week? For the next 2 cycles, it looks correct to me. We just have to schedule his pump disconnect. It's the only thing that is missing.   Generic voicemail left for patient/spouse to contact office  Please relay above info

## 2023-11-29 DIAGNOSIS — Z79.899 MEDICATION DOSE INCREASED: ICD-10-CM

## 2023-11-29 DIAGNOSIS — I50.9 CONGESTIVE HEART FAILURE, UNSPECIFIED HF CHRONICITY, UNSPECIFIED HEART FAILURE TYPE (HCC): ICD-10-CM

## 2023-11-29 RX ORDER — SACUBITRIL AND VALSARTAN 97; 103 MG/1; MG/1
1 TABLET, FILM COATED ORAL 2 TIMES DAILY
Qty: 60 TABLET | Refills: 0 | Status: SHIPPED | OUTPATIENT
Start: 2023-11-29

## 2024-01-04 ENCOUNTER — HOSPITAL ENCOUNTER (OUTPATIENT)
Dept: OTHER | Age: 69
Setting detail: THERAPIES SERIES
Discharge: HOME | End: 2024-01-04
Payer: MEDICARE

## 2024-01-04 VITALS
BODY MASS INDEX: 19.73 KG/M2 | OXYGEN SATURATION: 96 % | DIASTOLIC BLOOD PRESSURE: 60 MMHG | SYSTOLIC BLOOD PRESSURE: 151 MMHG | RESPIRATION RATE: 18 BRPM | HEART RATE: 60 BPM | WEIGHT: 126 LBS

## 2024-01-04 DIAGNOSIS — I50.41 ACUTE COMBINED SYSTOLIC AND DIASTOLIC CONGESTIVE HEART FAILURE (HCC): Primary | ICD-10-CM

## 2024-01-04 DIAGNOSIS — I50.9 CONGESTIVE HEART FAILURE, UNSPECIFIED HF CHRONICITY, UNSPECIFIED HEART FAILURE TYPE (HCC): ICD-10-CM

## 2024-01-04 LAB
ANION GAP SERPL CALCULATED.3IONS-SCNC: 11 MMOL/L (ref 7–16)
BNP SERPL-MCNC: 927 PG/ML (ref 0–125)
BUN SERPL-MCNC: 45 MG/DL (ref 6–23)
CALCIUM SERPL-MCNC: 9.5 MG/DL (ref 8.6–10.2)
CHLORIDE SERPL-SCNC: 105 MMOL/L (ref 98–107)
CO2 SERPL-SCNC: 22 MMOL/L (ref 22–29)
CREAT SERPL-MCNC: 1.7 MG/DL (ref 0.5–1)
GFR SERPL CREATININE-BSD FRML MDRD: 33 ML/MIN/1.73M2
GLUCOSE SERPL-MCNC: 84 MG/DL (ref 74–99)
POTASSIUM SERPL-SCNC: 5.2 MMOL/L (ref 3.5–5)
SODIUM SERPL-SCNC: 138 MMOL/L (ref 132–146)

## 2024-01-04 PROCEDURE — 80048 BASIC METABOLIC PNL TOTAL CA: CPT

## 2024-01-04 PROCEDURE — 99214 OFFICE O/P EST MOD 30 MIN: CPT

## 2024-01-04 PROCEDURE — 36415 COLL VENOUS BLD VENIPUNCTURE: CPT

## 2024-01-04 PROCEDURE — 83880 ASSAY OF NATRIURETIC PEPTIDE: CPT

## 2024-01-04 RX ORDER — SPIRONOLACTONE 25 MG/1
12.5 TABLET ORAL DAILY
Qty: 30 TABLET | Refills: 3 | Status: SHIPPED | OUTPATIENT
Start: 2024-01-04

## 2024-01-04 ASSESSMENT — PATIENT HEALTH QUESTIONNAIRE - PHQ9
SUM OF ALL RESPONSES TO PHQ9 QUESTIONS 1 & 2: 0
2. FEELING DOWN, DEPRESSED OR HOPELESS: NOT AT ALL
1. LITTLE INTEREST OR PLEASURE IN DOING THINGS: NOT AT ALL

## 2024-01-04 NOTE — PROGRESS NOTES
Date of Service: 1/4/2024  8:30 AM     Signed         Labs and CHF clinic note reviewed     Vitals:BP:(!) 151/60  Pulse: 60     Decrease spironolactone 12.5 mg   Follow up labs in 5-7 days     Thank you                               Patient notified of above instructions. Demonstrates understanding.     
blocker)  No  Cardiac glycoside (2b indication for symptomatic HFrEF)  No  Soluble Guanylate Cyclase (sGC) Stimulator (2b indication LVEF <45% with recent HFH, IV diuretics or elevated BNP)  No  Potassium binders (2b indication for hyperkalemia while taking RAASi)  No              HEART FAILURE FOCUSED PHYSICAL EXAMINATION:    Vitals:    01/04/24 0815   BP: (!) 151/60   Pulse: 60   Resp: 18   SpO2: 96%        Assessment  Charting Type: Shift assessment        HEENT (Head, Ears, Eyes, Nose, & Throat)  HEENT (WDL): Exceptions to WDL  Right Eye: Glasses  Left Eye: Glasses  Respiratory  Respiratory Pattern: Regular  Respiratory Depth: Normal  Respiratory Quality/Effort: Unlabored  Chest Assessment: Chest expansion symmetrical  L Breath Sounds: Diminished, Clear  R Breath Sounds: Diminished, Clear        Cardiac  Cardiac Regularity: Regular  Cardiac Rhythm: Sinus rhythm  Rhythm Interpretation  Pulse: 60     Gastrointestinal  Abdominal (WDL): Within Defined Limits         Peripheral Vascular  Peripheral Vascular (WDL): Within Defined Limits  Edema: None                             Pulse: 60               LAB DATA:  BMP:  Sodium (mmol/L)   Date Value   01/04/2024 138   10/24/2023 141   07/25/2023 141     Potassium (mmol/L)   Date Value   01/04/2024 5.2 (H)   10/24/2023 4.3   07/25/2023 4.2     Potassium reflex Magnesium (mmol/L)   Date Value   05/24/2022 4.1     Chloride (mmol/L)   Date Value   01/04/2024 105   10/24/2023 105   07/25/2023 109 (H)     CO2 (mmol/L)   Date Value   01/04/2024 22   10/24/2023 29   07/25/2023 24     BUN (mg/dL)   Date Value   01/04/2024 45 (H)   10/24/2023 17   07/25/2023 44 (H)     Creatinine (mg/dL)   Date Value   01/04/2024 1.7 (H)   10/24/2023 1.3 (H)   07/25/2023 1.7 (H)     Glucose (mg/dL)   Date Value   01/04/2024 84   10/24/2023 87   07/25/2023 98     Calcium (mg/dL)   Date Value   01/04/2024 9.5   10/24/2023 9.5   07/25/2023 9.4     BNP:  Pro-BNP (pg/mL)   Date Value   01/04/2024 92

## 2024-01-04 NOTE — FLOWSHEET NOTE
01/04/24 1596   Over the past 2 weeks, how often have you been bothered by any of the following problems?   Little interest or pleasure in doing things Not at all   Feeling down, depressed, or hopeless Not at all   Patient Health Questionnaire-2 Score 0

## 2024-01-04 NOTE — RESULT ENCOUNTER NOTE
Labs and CHF clinic note reviewed    Vitals:BP:(!) 151/60  Pulse: 60    Decrease spironolactone 12.5 mg   Follow up labs in 5-7 days    Thank you

## 2024-01-04 NOTE — PLAN OF CARE
Problem: Chronic Conditions and Co-morbidities  Goal: Patient's chronic conditions and co-morbidity symptoms are monitored and maintained or improved  Flowsheets (Taken 1/4/2024 6429)  Care Plan - Patient's Chronic Conditions and Co-Morbidity Symptoms are Monitored and Maintained or Improved: Monitor and assess patient's chronic conditions and comorbid symptoms for stability, deterioration, or improvement

## 2024-01-05 DIAGNOSIS — Z79.899 MEDICATION DOSE INCREASED: ICD-10-CM

## 2024-01-05 DIAGNOSIS — I50.9 CONGESTIVE HEART FAILURE, UNSPECIFIED HF CHRONICITY, UNSPECIFIED HEART FAILURE TYPE (HCC): ICD-10-CM

## 2024-01-08 RX ORDER — SACUBITRIL AND VALSARTAN 97; 103 MG/1; MG/1
1 TABLET, FILM COATED ORAL 2 TIMES DAILY
Qty: 60 TABLET | Refills: 0 | Status: SHIPPED | OUTPATIENT
Start: 2024-01-08

## 2024-01-19 ENCOUNTER — TELEPHONE (OUTPATIENT)
Dept: PRIMARY CARE CLINIC | Age: 69
End: 2024-01-19

## 2024-01-19 NOTE — TELEPHONE ENCOUNTER
Pt is calling asking if you can write her a letter she is trying to get a handicap apartment  and they are asking what her disability's are. She also stated she has an aide that comes out a couple times a week.    Please advise?

## 2024-01-24 ENCOUNTER — OFFICE VISIT (OUTPATIENT)
Dept: PRIMARY CARE CLINIC | Age: 69
End: 2024-01-24
Payer: MEDICARE

## 2024-01-24 VITALS
RESPIRATION RATE: 16 BRPM | TEMPERATURE: 97.2 F | WEIGHT: 127 LBS | HEART RATE: 60 BPM | HEIGHT: 67 IN | BODY MASS INDEX: 19.93 KG/M2 | SYSTOLIC BLOOD PRESSURE: 128 MMHG | DIASTOLIC BLOOD PRESSURE: 74 MMHG | OXYGEN SATURATION: 95 %

## 2024-01-24 DIAGNOSIS — N18.32 STAGE 3B CHRONIC KIDNEY DISEASE (HCC): ICD-10-CM

## 2024-01-24 DIAGNOSIS — R06.00 DYSPNEA, UNSPECIFIED TYPE: ICD-10-CM

## 2024-01-24 DIAGNOSIS — Z00.00 ENCOUNTER FOR SUBSEQUENT ANNUAL WELLNESS VISIT (AWV) IN MEDICARE PATIENT: Primary | ICD-10-CM

## 2024-01-24 DIAGNOSIS — I35.0 SEVERE AORTIC STENOSIS: ICD-10-CM

## 2024-01-24 DIAGNOSIS — Z87.891 PERSONAL HISTORY OF TOBACCO USE: ICD-10-CM

## 2024-01-24 PROCEDURE — G8484 FLU IMMUNIZE NO ADMIN: HCPCS | Performed by: PHYSICIAN ASSISTANT

## 2024-01-24 PROCEDURE — 3078F DIAST BP <80 MM HG: CPT | Performed by: PHYSICIAN ASSISTANT

## 2024-01-24 PROCEDURE — 3017F COLORECTAL CA SCREEN DOC REV: CPT | Performed by: PHYSICIAN ASSISTANT

## 2024-01-24 PROCEDURE — 3074F SYST BP LT 130 MM HG: CPT | Performed by: PHYSICIAN ASSISTANT

## 2024-01-24 PROCEDURE — 1123F ACP DISCUSS/DSCN MKR DOCD: CPT | Performed by: PHYSICIAN ASSISTANT

## 2024-01-24 PROCEDURE — G0439 PPPS, SUBSEQ VISIT: HCPCS | Performed by: PHYSICIAN ASSISTANT

## 2024-01-24 ASSESSMENT — PATIENT HEALTH QUESTIONNAIRE - PHQ9
SUM OF ALL RESPONSES TO PHQ QUESTIONS 1-9: 0
SUM OF ALL RESPONSES TO PHQ QUESTIONS 1-9: 0
2. FEELING DOWN, DEPRESSED OR HOPELESS: 0
SUM OF ALL RESPONSES TO PHQ QUESTIONS 1-9: 0
SUM OF ALL RESPONSES TO PHQ9 QUESTIONS 1 & 2: 0
1. LITTLE INTEREST OR PLEASURE IN DOING THINGS: 0

## 2024-01-24 ASSESSMENT — LIFESTYLE VARIABLES
HOW OFTEN DO YOU HAVE A DRINK CONTAINING ALCOHOL: NEVER
HOW MANY STANDARD DRINKS CONTAINING ALCOHOL DO YOU HAVE ON A TYPICAL DAY: PATIENT DOES NOT DRINK

## 2024-01-24 NOTE — PATIENT INSTRUCTIONS
2023               Content Version: 13.9  © 2006-2023 [email or web address].   Care instructions adapted under license by [employer]. If you have questions about a medical condition or this instruction, always ask your healthcare professional. [email or web address] disclaims any warranty or liability for your use of this information.           A Healthy Heart: Care Instructions  Your Care Instructions     Coronary artery disease, also called heart disease, occurs when a substance called plaque builds up in the vessels that supply oxygen-rich blood to your heart muscle. This can narrow the blood vessels and reduce blood flow. A heart attack happens when blood flow is completely blocked. A high-fat diet, smoking, and other factors increase the risk of heart disease.  Your doctor has found that you have a chance of having heart disease. You can do lots of things to keep your heart healthy. It may not be easy, but you can change your diet, exercise more, and quit smoking. These steps really work to lower your chance of heart disease.  Follow-up care is a key part of your treatment and safety. Be sure to make and go to all appointments, and call your doctor if you are having problems. It's also a good idea to know your test results and keep a list of the medicines you take.  How can you care for yourself at home?  Diet    Use less salt when you cook and eat. This helps lower your blood pressure. Taste food before salting. Add only a little salt when you think you need it. With time, your taste buds will adjust to less salt.     Eat fewer snack items, fast foods, canned soups, and other high-salt, high-fat, processed foods.     Read food labels and try to avoid saturated and trans fats. They increase your risk of heart disease by raising cholesterol levels.     Limit the amount of solid fat-butter, margarine, and shortening-you eat. Use olive, peanut, or canola oil when you cook. Bake, broil, and steam foods

## 2024-01-24 NOTE — PROGRESS NOTES
Medicare Annual Wellness Visit    Milka Quinteros is here for Medicare AWV    Assessment & Plan   AWV, subsequent  Stage 3b chronic kidney disease (HCC)  Severe aortic stenosis  Dyspnea, unspecified type  Personal history of tobacco use  -declines assistance now  -letter written for her to obtain a first floor apartment.    Recommendations for Preventive Services Due: see orders and patient instructions/AVS.  Recommended screening schedule for the next 5-10 years is provided to the patient in written form: see Patient Instructions/AVS.     Return for Medicare Annual Wellness Visit in 1 year.     Subjective   The following acute and/or chronic problems were also addressed today:  Needs a note to get a first floor apartment.   Takes 15 minutes to get up 1 flight of stairs at nieces apartment  She gets sob.    Patient's complete Health Risk Assessment and screening values have been reviewed and are found in Flowsheets. The following problems were reviewed today and where indicated follow up appointments were made and/or referrals ordered.    Positive Risk Factor Screenings with Interventions:       Cognitive:   Clock Drawing Test (CDT): (!) Abnormal  Words recalled: 1 Word Recalled  Total Score: (!) 1  Total Score Interpretation: Abnormal Mini-Cog  Interventions:  Patient declines any further evaluation or treatment               Vision Screen:  Do you have difficulty driving, watching TV, or doing any of your daily activities because of your eyesight?: No  Have you had an eye exam within the past year?: (!) No  No results found.    Interventions:   Patient encouraged to make appointment with their eye specialist    Safety:  Do you have non-slip mats or non-slip surfaces or shower bars or grab bars in your shower or bathtub?: (!) No  Interventions:  Patient declined any further interventions or treatment     Advanced Directives:  Do you have a Living Will?: (!) No    Intervention:  has an advanced directive - she had one

## 2024-01-25 ENCOUNTER — HOSPITAL ENCOUNTER (OUTPATIENT)
Age: 69
Discharge: HOME | End: 2024-01-25
Payer: MEDICARE

## 2024-01-25 DIAGNOSIS — I50.41 ACUTE COMBINED SYSTOLIC AND DIASTOLIC CONGESTIVE HEART FAILURE (HCC): ICD-10-CM

## 2024-01-25 LAB
ANION GAP SERPL CALCULATED.3IONS-SCNC: 8 MMOL/L (ref 7–16)
BUN SERPL-MCNC: 34 MG/DL (ref 6–23)
CALCIUM SERPL-MCNC: 10 MG/DL (ref 8.6–10.2)
CHLORIDE SERPL-SCNC: 106 MMOL/L (ref 98–107)
CO2 SERPL-SCNC: 26 MMOL/L (ref 22–29)
CREAT SERPL-MCNC: 1.7 MG/DL (ref 0.5–1)
GFR SERPL CREATININE-BSD FRML MDRD: 33 ML/MIN/1.73M2
GLUCOSE SERPL-MCNC: 92 MG/DL (ref 74–99)
POTASSIUM SERPL-SCNC: 4.9 MMOL/L (ref 3.5–5)
SODIUM SERPL-SCNC: 140 MMOL/L (ref 132–146)

## 2024-01-25 PROCEDURE — 36415 COLL VENOUS BLD VENIPUNCTURE: CPT

## 2024-01-25 PROCEDURE — 80048 BASIC METABOLIC PNL TOTAL CA: CPT

## 2024-01-25 NOTE — RESULT ENCOUNTER NOTE
Labs reviewed  Potassium improved with decreased spironolactone  If she is feeling ok, continue same medications and follow up as scheduled    Thank you

## 2024-01-30 ENCOUNTER — TELEPHONE (OUTPATIENT)
Dept: PRIMARY CARE CLINIC | Age: 69
End: 2024-01-30

## 2024-01-30 ENCOUNTER — OFFICE VISIT (OUTPATIENT)
Dept: CARDIOLOGY CLINIC | Age: 69
End: 2024-01-30
Payer: MEDICARE

## 2024-01-30 VITALS
DIASTOLIC BLOOD PRESSURE: 60 MMHG | HEART RATE: 64 BPM | RESPIRATION RATE: 18 BRPM | WEIGHT: 129 LBS | BODY MASS INDEX: 20.25 KG/M2 | HEIGHT: 67 IN | SYSTOLIC BLOOD PRESSURE: 130 MMHG

## 2024-01-30 DIAGNOSIS — E78.00 PURE HYPERCHOLESTEROLEMIA: ICD-10-CM

## 2024-01-30 DIAGNOSIS — I50.41 ACUTE COMBINED SYSTOLIC AND DIASTOLIC CONGESTIVE HEART FAILURE (HCC): Primary | ICD-10-CM

## 2024-01-30 DIAGNOSIS — Z72.0 TOBACCO ABUSE: ICD-10-CM

## 2024-01-30 DIAGNOSIS — I10 PRIMARY HYPERTENSION: ICD-10-CM

## 2024-01-30 DIAGNOSIS — I35.0 SEVERE AORTIC STENOSIS: ICD-10-CM

## 2024-01-30 PROCEDURE — 1090F PRES/ABSN URINE INCON ASSESS: CPT | Performed by: INTERNAL MEDICINE

## 2024-01-30 PROCEDURE — 4004F PT TOBACCO SCREEN RCVD TLK: CPT | Performed by: INTERNAL MEDICINE

## 2024-01-30 PROCEDURE — G8484 FLU IMMUNIZE NO ADMIN: HCPCS | Performed by: INTERNAL MEDICINE

## 2024-01-30 PROCEDURE — 3017F COLORECTAL CA SCREEN DOC REV: CPT | Performed by: INTERNAL MEDICINE

## 2024-01-30 PROCEDURE — G8420 CALC BMI NORM PARAMETERS: HCPCS | Performed by: INTERNAL MEDICINE

## 2024-01-30 PROCEDURE — G8427 DOCREV CUR MEDS BY ELIG CLIN: HCPCS | Performed by: INTERNAL MEDICINE

## 2024-01-30 PROCEDURE — 1123F ACP DISCUSS/DSCN MKR DOCD: CPT | Performed by: INTERNAL MEDICINE

## 2024-01-30 PROCEDURE — 93000 ELECTROCARDIOGRAM COMPLETE: CPT | Performed by: INTERNAL MEDICINE

## 2024-01-30 PROCEDURE — G8400 PT W/DXA NO RESULTS DOC: HCPCS | Performed by: INTERNAL MEDICINE

## 2024-01-30 PROCEDURE — 3078F DIAST BP <80 MM HG: CPT | Performed by: INTERNAL MEDICINE

## 2024-01-30 PROCEDURE — 3075F SYST BP GE 130 - 139MM HG: CPT | Performed by: INTERNAL MEDICINE

## 2024-01-30 PROCEDURE — 99214 OFFICE O/P EST MOD 30 MIN: CPT | Performed by: INTERNAL MEDICINE

## 2024-01-30 NOTE — PROGRESS NOTES
no focalizing motor or sensory deficits. CN II-XII are grossly intact..   EXTREMITIES: no cyanosis, no clubbing. 1+ bilateral lower extremity edema. Warm and well perfused.       All the following diagnostics were personally reviewed and interpreted by me.       LAB DATA:     5/25/2022 04:44 5/26/2022 05:05 6/13/2022 13:00   Sodium 141 139 140   Potassium 3.3 (L) 3.7 4.2   Chloride 105 103 108 (H)   CO2 23 27 22   BUN,BUNPL 16 14 25 (H)   Creatinine 1.5 (H) 1.4 (H) 1.5 (H)   Anion Gap 13 9 10   GFR Non- 42 46 42   GFR African American 42 46 42   Magnesium 1.5 (L) 1.8    GLUCOSE, FASTING,GF 79 86 87   CALCIUM, SERUM, 122722 8.3 (L) 8.1 (L) 8.7   Total Protein  5.3 (L)    Pro-BNP   28,263 (H)       IMAGING:    CXR (5/24/2022)  FINDINGS:  Left heart appears prominent, slight pulmonary venous hypertension.  No  evidence of pulmonary edema.  Right convexity thoracic scoliosis unchanged.  Lungs are clear.  No pleural fluid or focal pneumonia.  Impression  1.  Prominent left heart and pulmonary venous hypertension.  Findings suggest  minimal CHF/volume overload.  2.  No evidence of alveolar consolidation.      CARDIAC TESTING:    TTE (6/26/2018)   Summary   Left ventricular size is grossly normal.   Moderate left ventricular concentric hypertrophy noted.   Ejection fraction is visually estimated at 50%.   Severe aortic stenosis is present.   The aortic valve area is 0.76 cm2 with a maximum gradient of 55 mmHg and a   mean gradient of 29 mmHg.   Mild aortic regurgitation is noted.    TTE (8/11/2021)   Summary   Moderate concentric LVH   Ejection fraction is visually estimated at 50 to 55%.   Normal right ventricular size and function.   Mild thickening of the mitral valve leaflets.   Mild mitral regurgitation is present.   Severe calcification of the aortic valve .   Moderate aortic regurgitation is noted.   There severe aortic stenosis . The aortic valve area is 0.6cm2 and   Dimensionless index of 0.18 to

## 2024-01-30 NOTE — PATIENT INSTRUCTIONS
Continue all your medications at current doses.   Try alternating tylenol with mobic for pain.   Call me with any worsening symptoms.   Restrict sodium intake to less than 2-2.5 g/day. Restrict fluid intake to less than 2.2 L/day. Goal BP is less than 130/80.   If your weight increases by > 2 lbs in a day or >5 lbs in more than a day, take an extra lasix pill.   Please try to exercise for 150 minutes a week.   I will see you back in the office in 6 months. Please call the office at ([phone], option [phone]) if you have any questions.     Outpatient Medications Marked as Taking for the 10/19/23 encounter (Telephone) with Jackelyn Lara PA-C   Medication Sig Dispense Refill   • [START ON 10/22/2023] gabapentin (NEURONTIN) 800 MG tablet Take 1 tablet by mouth 4 times daily. Do not start before October 22, 2023. 360 tablet 4         No call back needed unless nurse has questions.     Pharmacy: Fax   Vitals wdl, patient able to tolerate fluids post procedure, site clean dry intact, discharge education provided to patient, patient stated understanding, left floor via wheelchair, discharged to home

## 2024-01-30 NOTE — TELEPHONE ENCOUNTER
Pt calling she has a gout flare up in her right great toe that has been hurting for 2 weeks, she was at heart doctor and he told her to check with pcp. She is currently on allopurinol     advise

## 2024-01-31 DIAGNOSIS — M10.9 ACUTE GOUT INVOLVING TOE, UNSPECIFIED CAUSE, UNSPECIFIED LATERALITY: Primary | ICD-10-CM

## 2024-01-31 RX ORDER — PREDNISONE 10 MG/1
10 TABLET ORAL 2 TIMES DAILY
Qty: 10 TABLET | Refills: 0 | Status: SHIPPED | OUTPATIENT
Start: 2024-01-31 | End: 2024-02-05

## 2024-02-04 DIAGNOSIS — Z79.899 MEDICATION DOSE INCREASED: ICD-10-CM

## 2024-02-04 DIAGNOSIS — I50.9 CONGESTIVE HEART FAILURE, UNSPECIFIED HF CHRONICITY, UNSPECIFIED HEART FAILURE TYPE (HCC): ICD-10-CM

## 2024-02-05 RX ORDER — SACUBITRIL AND VALSARTAN 97; 103 MG/1; MG/1
1 TABLET, FILM COATED ORAL 2 TIMES DAILY
Qty: 60 TABLET | Refills: 3 | Status: SHIPPED | OUTPATIENT
Start: 2024-02-05

## 2024-02-11 DIAGNOSIS — I50.9 CONGESTIVE HEART FAILURE, UNSPECIFIED HF CHRONICITY, UNSPECIFIED HEART FAILURE TYPE (HCC): ICD-10-CM

## 2024-02-11 DIAGNOSIS — Z79.899 NEW MEDICATION ADDED: ICD-10-CM

## 2024-02-12 ENCOUNTER — OFFICE VISIT (OUTPATIENT)
Dept: NEUROLOGY | Age: 69
End: 2024-02-12
Payer: MEDICARE

## 2024-02-12 VITALS
SYSTOLIC BLOOD PRESSURE: 188 MMHG | OXYGEN SATURATION: 98 % | HEART RATE: 68 BPM | TEMPERATURE: 97.6 F | DIASTOLIC BLOOD PRESSURE: 60 MMHG

## 2024-02-12 DIAGNOSIS — G25.0 ESSENTIAL TREMOR: Primary | ICD-10-CM

## 2024-02-12 PROCEDURE — G8420 CALC BMI NORM PARAMETERS: HCPCS | Performed by: NURSE PRACTITIONER

## 2024-02-12 PROCEDURE — 3017F COLORECTAL CA SCREEN DOC REV: CPT | Performed by: NURSE PRACTITIONER

## 2024-02-12 PROCEDURE — 99212 OFFICE O/P EST SF 10 MIN: CPT | Performed by: NURSE PRACTITIONER

## 2024-02-12 PROCEDURE — G8427 DOCREV CUR MEDS BY ELIG CLIN: HCPCS | Performed by: NURSE PRACTITIONER

## 2024-02-12 PROCEDURE — 3077F SYST BP >= 140 MM HG: CPT | Performed by: NURSE PRACTITIONER

## 2024-02-12 PROCEDURE — 3078F DIAST BP <80 MM HG: CPT | Performed by: NURSE PRACTITIONER

## 2024-02-12 PROCEDURE — 1090F PRES/ABSN URINE INCON ASSESS: CPT | Performed by: NURSE PRACTITIONER

## 2024-02-12 PROCEDURE — 1123F ACP DISCUSS/DSCN MKR DOCD: CPT | Performed by: NURSE PRACTITIONER

## 2024-02-12 PROCEDURE — 4004F PT TOBACCO SCREEN RCVD TLK: CPT | Performed by: NURSE PRACTITIONER

## 2024-02-12 PROCEDURE — G8484 FLU IMMUNIZE NO ADMIN: HCPCS | Performed by: NURSE PRACTITIONER

## 2024-02-12 PROCEDURE — G8400 PT W/DXA NO RESULTS DOC: HCPCS | Performed by: NURSE PRACTITIONER

## 2024-02-12 RX ORDER — ALLOPURINOL 100 MG/1
TABLET ORAL
Qty: 60 TABLET | Refills: 2 | Status: SHIPPED | OUTPATIENT
Start: 2024-02-12

## 2024-02-12 RX ORDER — EMPAGLIFLOZIN 10 MG/1
10 TABLET, FILM COATED ORAL DAILY
Qty: 60 TABLET | Refills: 1 | Status: SHIPPED | OUTPATIENT
Start: 2024-02-12

## 2024-02-12 RX ORDER — PRIMIDONE 50 MG/1
25 TABLET ORAL 2 TIMES DAILY
Qty: 90 TABLET | Refills: 0 | Status: SHIPPED | OUTPATIENT
Start: 2024-02-12 | End: 2024-05-12

## 2024-02-12 NOTE — PROGRESS NOTES
[facility]  NEUROLOGY  Michael Hurst Jr., M.D., F.A.C.P.  Osiel Sullivan, DNP, APRN, ACNS-BC  Sina Black, MSN, APRN-FNP-C  Kayla Wade, MSPAS, PA-C  Alyssa Hughes, MSN, APRN-FNP-C  Alley Hoang, MSN, APRN-FNP-C   Jaclyn Petty, MSN, APRN-FNP-C  [facility] [address]  Phone: [phone] [address] [address]  Phone: [phone]  Fax: [phone]       Milka Quinteros is a 68 y.o. right handed female     Patient presents to neurology clinic today for evaluation and management of tremor    Patient presents alone and is deemed a good historian.    Patient states she began noticing it after she was diagnosed with CHF last year.  Patient says the tremors have worsened recently.  Patient says that for the longest time she was asking for help with these but no one addressed them. Patient has not noticed any worsening with caffeine, improvement with alcohol which she rarely drinks or any changes after taking her albuterol however admits to not necessarily paying attention during those times to have noticed a difference.  Patient does not appreciate any worsening after doing things such as the above. Patient says sometimes when she holds things she is unable to such as getting things out of the oven.  Most days she can but at times she has to call for help.  Patient says she does not notice dropping smaller items such as cell phone, pins or things but her handwriting has worsened in recent months.  Patient states she has not ever noticed leg, neck, head or mouth involvement.  Patient denies any family history of essential tremor or PD.  Patient denies swallowing difficulties, drooling, headache, dizziness, falls or focal weakness.    Today patient reports she has had improvement by taking primidone nightly.  Unfortunately patient got primidone mixed that with another medication that she was taking twice daily and she has been taking primidone twice daily.

## 2024-02-12 NOTE — PROGRESS NOTES
Progress Note  Date:2024       Room:Room/bed info not found  Patient Name:Milka Quinteros     YOB: 1955     Age:68 y.o.        Subjective    Subjective   Review of Systems  Objective         Vitals Last 24 Hours:  TEMPERATURE:  Temp  Av.6 °F (36.4 °C)  Min: 97.6 °F (36.4 °C)  Max: 97.6 °F (36.4 °C)  RESPIRATIONS RANGE: No data recorded  PULSE OXIMETRY RANGE: SpO2  Av %  Min: 98 %  Max: 98 %  PULSE RANGE: Pulse  Av  Min: 68  Max: 68  BLOOD PRESSURE RANGE: Systolic (24hrs), Av , Min:188 , Max:188   ; Diastolic (24hrs), Av, Min:60, Max:60    I/O (24Hr):  No intake or output data in the 24 hours ending 24 1050  Objective  Labs/Imaging/Diagnostics    Labs:  CBC:No results for input(s): \"WBC\", \"RBC\", \"HGB\", \"HCT\", \"MCV\", \"RDW\", \"PLT\" in the last 72 hours.  CHEMISTRIES:No results for input(s): \"NA\", \"K\", \"CL\", \"CO2\", \"BUN\", \"CREATININE\", \"GLUCOSE\", \"CA\", \"PHOS\", \"MG\" in the last 72 hours.  PT/INR:No results for input(s): \"PROTIME\", \"INR\" in the last 72 hours.  APTT:No results for input(s): \"APTT\" in the last 72 hours.  LIVER PROFILE:No results for input(s): \"AST\", \"ALT\", \"BILIDIR\", \"BILITOT\", \"ALKPHOS\" in the last 72 hours.    Imaging Last 24 Hours:  No results found.  Assessment//Plan           Assessment & Plan    Electronically signed by AILEEN Ray NP on 24 at 10:50 AM EST

## 2024-02-26 ENCOUNTER — OFFICE VISIT (OUTPATIENT)
Dept: PRIMARY CARE CLINIC | Age: 69
End: 2024-02-26
Payer: MEDICARE

## 2024-02-26 VITALS
RESPIRATION RATE: 16 BRPM | BODY MASS INDEX: 19.93 KG/M2 | HEIGHT: 67 IN | TEMPERATURE: 97.2 F | WEIGHT: 127 LBS | SYSTOLIC BLOOD PRESSURE: 136 MMHG | DIASTOLIC BLOOD PRESSURE: 68 MMHG

## 2024-02-26 DIAGNOSIS — Z12.31 BREAST CANCER SCREENING BY MAMMOGRAM: ICD-10-CM

## 2024-02-26 DIAGNOSIS — M54.9 UPPER BACK PAIN ON LEFT SIDE: Primary | ICD-10-CM

## 2024-02-26 DIAGNOSIS — M54.41 ACUTE BILATERAL LOW BACK PAIN WITH RIGHT-SIDED SCIATICA: ICD-10-CM

## 2024-02-26 DIAGNOSIS — M54.2 NECK PAIN: ICD-10-CM
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
## 2024-02-26 PROCEDURE — 3078F DIAST BP <80 MM HG: CPT | Performed by: PHYSICIAN ASSISTANT

## 2024-02-26 PROCEDURE — 3075F SYST BP GE 130 - 139MM HG: CPT | Performed by: PHYSICIAN ASSISTANT

## 2024-02-26 PROCEDURE — G8427 DOCREV CUR MEDS BY ELIG CLIN: HCPCS | Performed by: PHYSICIAN ASSISTANT

## 2024-02-26 PROCEDURE — 1123F ACP DISCUSS/DSCN MKR DOCD: CPT | Performed by: PHYSICIAN ASSISTANT

## 2024-02-26 PROCEDURE — G8420 CALC BMI NORM PARAMETERS: HCPCS | Performed by: PHYSICIAN ASSISTANT

## 2024-02-26 PROCEDURE — 1090F PRES/ABSN URINE INCON ASSESS: CPT | Performed by: PHYSICIAN ASSISTANT

## 2024-02-26 PROCEDURE — G8484 FLU IMMUNIZE NO ADMIN: HCPCS | Performed by: PHYSICIAN ASSISTANT

## 2024-02-26 PROCEDURE — 3017F COLORECTAL CA SCREEN DOC REV: CPT | Performed by: PHYSICIAN ASSISTANT

## 2024-02-26 PROCEDURE — 4004F PT TOBACCO SCREEN RCVD TLK: CPT | Performed by: PHYSICIAN ASSISTANT

## 2024-02-26 PROCEDURE — 99214 OFFICE O/P EST MOD 30 MIN: CPT | Performed by: PHYSICIAN ASSISTANT

## 2024-02-26 PROCEDURE — G8400 PT W/DXA NO RESULTS DOC: HCPCS | Performed by: PHYSICIAN ASSISTANT

## 2024-02-26 RX ORDER — METHYLPREDNISOLONE 4 MG/1
TABLET ORAL
Qty: 21 TABLET | Refills: 0 | Status: SHIPPED | OUTPATIENT
Start: 2024-02-26 | End: 2024-03-03

## 2024-02-26 RX ORDER — BACLOFEN 5 MG/1
5 TABLET ORAL 3 TIMES DAILY
Qty: 30 TABLET | Refills: 0 | Status: SHIPPED | OUTPATIENT
Start: 2024-02-26
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
## 2024-03-08 ENCOUNTER — TELEPHONE (OUTPATIENT)
Dept: PRIMARY CARE CLINIC | Age: 69
End: 2024-03-08

## 2024-03-08 DIAGNOSIS — I50.9 CONGESTIVE HEART FAILURE, UNSPECIFIED HF CHRONICITY, UNSPECIFIED HEART FAILURE TYPE (HCC): ICD-10-CM

## 2024-03-08 DIAGNOSIS — M54.41 ACUTE BILATERAL LOW BACK PAIN WITH RIGHT-SIDED SCIATICA: ICD-10-CM

## 2024-03-08 RX ORDER — ASPIRIN 81 MG/1
81 TABLET, COATED ORAL DAILY
Qty: 30 TABLET | Refills: 5 | Status: SHIPPED | OUTPATIENT
Start: 2024-03-08

## 2024-03-08 RX ORDER — MELOXICAM 7.5 MG/1
TABLET ORAL
Qty: 30 TABLET | Refills: 5 | OUTPATIENT
Start: 2024-03-08
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
## 2024-03-11 DIAGNOSIS — I50.9 CONGESTIVE HEART FAILURE, UNSPECIFIED HF CHRONICITY, UNSPECIFIED HEART FAILURE TYPE (HCC): ICD-10-CM

## 2024-03-11 RX ORDER — SPIRONOLACTONE 25 MG/1
12.5 TABLET ORAL DAILY
Qty: 30 TABLET | Refills: 3 | Status: SHIPPED | OUTPATIENT
Start: 2024-03-11

## 2024-03-11 RX ORDER — PRIMIDONE 50 MG/1
25 TABLET ORAL 2 TIMES DAILY
Qty: 90 TABLET | Refills: 0 | Status: SHIPPED | OUTPATIENT
Start: 2024-03-11 | End: 2024-06-09

## 2024-03-13 ENCOUNTER — OFFICE VISIT (OUTPATIENT)
Dept: PRIMARY CARE CLINIC | Age: 69
End: 2024-03-13
Payer: MEDICARE

## 2024-03-13 VITALS
TEMPERATURE: 97 F | SYSTOLIC BLOOD PRESSURE: 110 MMHG | RESPIRATION RATE: 16 BRPM | BODY MASS INDEX: 20.4 KG/M2 | HEIGHT: 67 IN | WEIGHT: 130 LBS | DIASTOLIC BLOOD PRESSURE: 80 MMHG

## 2024-03-13 DIAGNOSIS — M54.2 NECK PAIN: ICD-10-CM

## 2024-03-13 DIAGNOSIS — J30.2 SEASONAL ALLERGIES: Primary | ICD-10-CM

## 2024-03-13 DIAGNOSIS — L30.9 ECZEMA, UNSPECIFIED TYPE: ICD-10-CM

## 2024-03-13 DIAGNOSIS — M54.9 UPPER BACK PAIN ON LEFT SIDE: ICD-10-CM

## 2024-03-13 PROBLEM — I50.9 ACUTE DECOMPENSATED HEART FAILURE (HCC): Status: RESOLVED | Noted: 2022-05-24 | Resolved: 2024-03-13

## 2024-03-13 PROCEDURE — G8427 DOCREV CUR MEDS BY ELIG CLIN: HCPCS | Performed by: PHYSICIAN ASSISTANT

## 2024-03-13 PROCEDURE — 3074F SYST BP LT 130 MM HG: CPT | Performed by: PHYSICIAN ASSISTANT

## 2024-03-13 PROCEDURE — G8400 PT W/DXA NO RESULTS DOC: HCPCS | Performed by: PHYSICIAN ASSISTANT

## 2024-03-13 PROCEDURE — 1123F ACP DISCUSS/DSCN MKR DOCD: CPT | Performed by: PHYSICIAN ASSISTANT

## 2024-03-13 PROCEDURE — 4004F PT TOBACCO SCREEN RCVD TLK: CPT | Performed by: PHYSICIAN ASSISTANT

## 2024-03-13 PROCEDURE — G8420 CALC BMI NORM PARAMETERS: HCPCS | Performed by: PHYSICIAN ASSISTANT

## 2024-03-13 PROCEDURE — 3079F DIAST BP 80-89 MM HG: CPT | Performed by: PHYSICIAN ASSISTANT

## 2024-03-13 PROCEDURE — 3017F COLORECTAL CA SCREEN DOC REV: CPT | Performed by: PHYSICIAN ASSISTANT

## 2024-03-13 PROCEDURE — 96372 THER/PROPH/DIAG INJ SC/IM: CPT | Performed by: PHYSICIAN ASSISTANT

## 2024-03-13 PROCEDURE — 1090F PRES/ABSN URINE INCON ASSESS: CPT | Performed by: PHYSICIAN ASSISTANT

## 2024-03-13 PROCEDURE — G8484 FLU IMMUNIZE NO ADMIN: HCPCS | Performed by: PHYSICIAN ASSISTANT

## 2024-03-13 PROCEDURE — 99214 OFFICE O/P EST MOD 30 MIN: CPT | Performed by: PHYSICIAN ASSISTANT

## 2024-03-13 RX ORDER — DEXAMETHASONE SODIUM PHOSPHATE 4 MG/ML
4 INJECTION, SOLUTION INTRA-ARTICULAR; INTRALESIONAL; INTRAMUSCULAR; INTRAVENOUS; SOFT TISSUE ONCE
Status: COMPLETED | OUTPATIENT
Start: 2024-03-13 | End: 2024-03-13

## 2024-03-13 RX ORDER — CETIRIZINE HYDROCHLORIDE 10 MG/1
10 TABLET ORAL DAILY
Qty: 30 TABLET | Refills: 5 | Status: SHIPPED | OUTPATIENT
Start: 2024-03-13

## 2024-03-13 RX ORDER — LIDOCAINE 4 G/G
1 PATCH TOPICAL DAILY
Qty: 30 PATCH | Refills: 0 | Status: SHIPPED | OUTPATIENT
Start: 2024-03-13 | End: 2024-04-12

## 2024-03-13 RX ADMIN — DEXAMETHASONE SODIUM PHOSPHATE 4 MG: 4 INJECTION, SOLUTION INTRA-ARTICULAR; INTRALESIONAL; INTRAMUSCULAR; INTRAVENOUS; SOFT TISSUE at 09:11

## 2024-03-13 NOTE — PROGRESS NOTES
and wishes to proceed with above treatment plan.    Advised patient to call with any new medication issues, and read all Rx info from pharmacy to assure aware of all possible risks and side effects of medication before taking.    Reviewed age and gender appropriate health screening exams and vaccinations.  Advised patient regarding importance of keeping up with recommended health maintenance and to schedule as soon as possible if overdue, as this is important in assessing for undiagnosed pathology, especially cancer, as well as protecting against potentially harmful/life threatening disease.        Patient and/or guardian verbalizes understanding and agrees with above counseling, assessment and plan.    All questions answered.    CHARLIE LOZANO PA-C  3/13/2024    I have personally reviewed and updated the chief complaint, HPI, Past Medical, Family and Social History, as well as the above Review of Systems.

## 2024-03-27 RX ORDER — ALBUTEROL SULFATE 90 UG/1
2 AEROSOL, METERED RESPIRATORY (INHALATION) 4 TIMES DAILY PRN
Qty: 54 G | Refills: 1 | Status: SHIPPED | OUTPATIENT
Start: 2024-03-27

## 2024-04-15 ENCOUNTER — TELEPHONE (OUTPATIENT)
Dept: PRIMARY CARE CLINIC | Age: 69
End: 2024-04-15

## 2024-04-15 NOTE — TELEPHONE ENCOUNTER
----- Message from Dena Izquierdo sent at 4/15/2024  2:25 PM EDT -----  Subject: Referral Request    Reason for referral request? MRI referral to [facility] Patient stated that   [facility] has requested this MRI referral. Please call the   patient when the referral has been completed.  Provider patient wants to be referred to(if known):     Provider Phone Number(if known):    Additional Information for Provider?   ---------------------------------------------------------------------------  --------------  CALL BACK INFO    [phone]; OK to leave message on voicemail  ---------------------------------------------------------------------------  --------------

## 2024-04-17 ENCOUNTER — OFFICE VISIT (OUTPATIENT)
Dept: PRIMARY CARE CLINIC | Age: 69
End: 2024-04-17
Payer: MEDICAID

## 2024-04-17 ENCOUNTER — HOSPITAL ENCOUNTER (OUTPATIENT)
Dept: GENERAL RADIOLOGY | Age: 69
Discharge: HOME | End: 2024-04-19
Payer: MEDICAID

## 2024-04-17 ENCOUNTER — HOSPITAL ENCOUNTER (OUTPATIENT)
Age: 69
Discharge: HOME | End: 2024-04-19
Payer: MEDICAID

## 2024-04-17 VITALS
HEART RATE: 51 BPM | WEIGHT: 130 LBS | HEIGHT: 67 IN | TEMPERATURE: 97.2 F | RESPIRATION RATE: 16 BRPM | DIASTOLIC BLOOD PRESSURE: 62 MMHG | BODY MASS INDEX: 20.4 KG/M2 | OXYGEN SATURATION: 95 % | SYSTOLIC BLOOD PRESSURE: 138 MMHG

## 2024-04-17 DIAGNOSIS — M54.9 UPPER BACK PAIN ON LEFT SIDE: ICD-10-CM

## 2024-04-17 DIAGNOSIS — M25.512 LEFT SHOULDER PAIN, UNSPECIFIED CHRONICITY: ICD-10-CM

## 2024-04-17 DIAGNOSIS — M54.9 UPPER BACK PAIN ON LEFT SIDE: Primary | ICD-10-CM

## 2024-04-17 PROCEDURE — 72050 X-RAY EXAM NECK SPINE 4/5VWS: CPT

## 2024-04-17 PROCEDURE — G8427 DOCREV CUR MEDS BY ELIG CLIN: HCPCS | Performed by: PHYSICIAN ASSISTANT

## 2024-04-17 PROCEDURE — 1123F ACP DISCUSS/DSCN MKR DOCD: CPT | Performed by: PHYSICIAN ASSISTANT

## 2024-04-17 PROCEDURE — 99214 OFFICE O/P EST MOD 30 MIN: CPT | Performed by: PHYSICIAN ASSISTANT

## 2024-04-17 PROCEDURE — 3075F SYST BP GE 130 - 139MM HG: CPT | Performed by: PHYSICIAN ASSISTANT

## 2024-04-17 PROCEDURE — G8400 PT W/DXA NO RESULTS DOC: HCPCS | Performed by: PHYSICIAN ASSISTANT

## 2024-04-17 PROCEDURE — 4004F PT TOBACCO SCREEN RCVD TLK: CPT | Performed by: PHYSICIAN ASSISTANT

## 2024-04-17 PROCEDURE — 72074 X-RAY EXAM THORAC SPINE4/>VW: CPT

## 2024-04-17 PROCEDURE — 3017F COLORECTAL CA SCREEN DOC REV: CPT | Performed by: PHYSICIAN ASSISTANT

## 2024-04-17 PROCEDURE — G8420 CALC BMI NORM PARAMETERS: HCPCS | Performed by: PHYSICIAN ASSISTANT

## 2024-04-17 PROCEDURE — 73030 X-RAY EXAM OF SHOULDER: CPT

## 2024-04-17 PROCEDURE — 1090F PRES/ABSN URINE INCON ASSESS: CPT | Performed by: PHYSICIAN ASSISTANT

## 2024-04-17 PROCEDURE — 3078F DIAST BP <80 MM HG: CPT | Performed by: PHYSICIAN ASSISTANT

## 2024-04-17 RX ORDER — TRAMADOL HYDROCHLORIDE 50 MG/1
50 TABLET ORAL EVERY 6 HOURS PRN
Qty: 28 TABLET | Refills: 0 | Status: SHIPPED | OUTPATIENT
Start: 2024-04-17 | End: 2024-04-24
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
## 2024-04-18 DIAGNOSIS — R93.7 ABNORMAL X-RAY OF THORACIC SPINE: ICD-10-CM

## 2024-04-18 DIAGNOSIS — R93.7 ABNORMAL X-RAY OF CERVICAL SPINE: ICD-10-CM
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
## 2024-04-18 DIAGNOSIS — M54.9 UPPER BACK PAIN ON LEFT SIDE: Primary | ICD-10-CM

## 2024-04-18 DIAGNOSIS — M54.2 NECK PAIN: ICD-10-CM

## 2024-04-22 DIAGNOSIS — M54.2 NECK PAIN: Primary | ICD-10-CM

## 2024-04-22 DIAGNOSIS — M54.9 UPPER BACK PAIN ON LEFT SIDE: ICD-10-CM

## 2024-04-24 ENCOUNTER — TELEPHONE (OUTPATIENT)
Dept: PRIMARY CARE CLINIC | Age: 69
End: 2024-04-24

## 2024-04-24 DIAGNOSIS — F41.8 SITUATIONAL ANXIETY: Primary | ICD-10-CM

## 2024-04-24 DIAGNOSIS — F40.240 CLAUSTROPHOBIA: ICD-10-CM

## 2024-04-24 RX ORDER — LORAZEPAM 0.5 MG/1
0.5 TABLET ORAL EVERY 8 HOURS PRN
Qty: 2 TABLET | Refills: 0 | Status: SHIPPED | OUTPATIENT
Start: 2024-04-24 | End: 2024-05-24

## 2024-04-25 ENCOUNTER — HOSPITAL ENCOUNTER (OUTPATIENT)
Dept: OTHER | Age: 69
Setting detail: THERAPIES SERIES
Discharge: HOME | End: 2024-04-25
Payer: MEDICAID

## 2024-04-25 VITALS
WEIGHT: 126 LBS | RESPIRATION RATE: 18 BRPM | BODY MASS INDEX: 19.73 KG/M2 | HEART RATE: 56 BPM | DIASTOLIC BLOOD PRESSURE: 60 MMHG | SYSTOLIC BLOOD PRESSURE: 144 MMHG | OXYGEN SATURATION: 96 %

## 2024-04-25 LAB
ANION GAP SERPL CALCULATED.3IONS-SCNC: 13 MMOL/L (ref 7–16)
BNP SERPL-MCNC: 8165 PG/ML (ref 0–125)
BUN SERPL-MCNC: 19 MG/DL (ref 6–23)
CALCIUM SERPL-MCNC: 9.4 MG/DL (ref 8.6–10.2)
CHLORIDE SERPL-SCNC: 105 MMOL/L (ref 98–107)
CO2 SERPL-SCNC: 23 MMOL/L (ref 22–29)
CREAT SERPL-MCNC: 1.2 MG/DL (ref 0.5–1)
GFR SERPL CREATININE-BSD FRML MDRD: 48 ML/MIN/1.73M2
GLUCOSE SERPL-MCNC: 78 MG/DL (ref 74–99)
POTASSIUM SERPL-SCNC: 4.2 MMOL/L (ref 3.5–5)
SODIUM SERPL-SCNC: 141 MMOL/L (ref 132–146)

## 2024-04-25 PROCEDURE — 80048 BASIC METABOLIC PNL TOTAL CA: CPT

## 2024-04-25 PROCEDURE — 83880 ASSAY OF NATRIURETIC PEPTIDE: CPT

## 2024-04-25 PROCEDURE — 99214 OFFICE O/P EST MOD 30 MIN: CPT

## 2024-04-25 RX ORDER — MELOXICAM 7.5 MG/1
7.5 TABLET ORAL DAILY
COMMUNITY

## 2024-04-25 ASSESSMENT — PATIENT HEALTH QUESTIONNAIRE - PHQ9
SUM OF ALL RESPONSES TO PHQ QUESTIONS 1-9: 0
1. LITTLE INTEREST OR PLEASURE IN DOING THINGS: NOT AT ALL
SUM OF ALL RESPONSES TO PHQ QUESTIONS 1-9: 0
SUM OF ALL RESPONSES TO PHQ QUESTIONS 1-9: 0
SUM OF ALL RESPONSES TO PHQ9 QUESTIONS 1 & 2: 0
SUM OF ALL RESPONSES TO PHQ QUESTIONS 1-9: 0
2. FEELING DOWN, DEPRESSED OR HOPELESS: NOT AT ALL

## 2024-04-25 NOTE — PROGRESS NOTES
MRI [facility]   5/13/2024  3:30 PM [facility] RM 1 [facility]   6/19/2024  8:15 AM Tiffanie Tineo PA-C TRAIL [facility]   7/18/2024  8:30 AM SE CHF ROOM 1 [facility]

## 2024-04-25 NOTE — PLAN OF CARE
Problem: Chronic Conditions and Co-morbidities  Goal: Patient's chronic conditions and co-morbidity symptoms are monitored and maintained or improved  Flowsheets (Taken 4/25/2024 1146)  Care Plan - Patient's Chronic Conditions and Co-Morbidity Symptoms are Monitored and Maintained or Improved: Monitor and assess patient's chronic conditions and comorbid symptoms for stability, deterioration, or improvement

## 2024-04-29 ENCOUNTER — HOSPITAL ENCOUNTER (OUTPATIENT)
Dept: OTHER | Age: 69
Setting detail: THERAPIES SERIES
Discharge: HOME | End: 2024-04-29
Payer: MEDICAID

## 2024-04-29 ENCOUNTER — HOSPITAL ENCOUNTER (OUTPATIENT)
Dept: GENERAL RADIOLOGY | Age: 69
Discharge: HOME | End: 2024-05-01
Payer: MEDICAID

## 2024-04-29 ENCOUNTER — HOSPITAL ENCOUNTER (OUTPATIENT)
Age: 69
Discharge: HOME | End: 2024-04-29
Payer: MEDICAID

## 2024-04-29 VITALS
SYSTOLIC BLOOD PRESSURE: 146 MMHG | WEIGHT: 125 LBS | OXYGEN SATURATION: 100 % | BODY MASS INDEX: 20.8 KG/M2 | HEART RATE: 50 BPM | RESPIRATION RATE: 18 BRPM | DIASTOLIC BLOOD PRESSURE: 58 MMHG

## 2024-04-29 VITALS — BODY MASS INDEX: 20.99 KG/M2 | WEIGHT: 126 LBS | HEIGHT: 65 IN

## 2024-04-29 DIAGNOSIS — I50.41 ACUTE COMBINED SYSTOLIC AND DIASTOLIC CONGESTIVE HEART FAILURE (HCC): ICD-10-CM

## 2024-04-29 DIAGNOSIS — Z12.31 BREAST CANCER SCREENING BY MAMMOGRAM: ICD-10-CM

## 2024-04-29 DIAGNOSIS — I50.41 ACUTE COMBINED SYSTOLIC AND DIASTOLIC CONGESTIVE HEART FAILURE (HCC): Primary | ICD-10-CM

## 2024-04-29 LAB
ANION GAP SERPL CALCULATED.3IONS-SCNC: 13 MMOL/L (ref 7–16)
BNP SERPL-MCNC: 5104 PG/ML (ref 0–125)
BUN SERPL-MCNC: 28 MG/DL (ref 6–23)
CALCIUM SERPL-MCNC: 9.4 MG/DL (ref 8.6–10.2)
CHLORIDE SERPL-SCNC: 105 MMOL/L (ref 98–107)
CO2 SERPL-SCNC: 22 MMOL/L (ref 22–29)
CREAT SERPL-MCNC: 1.5 MG/DL (ref 0.5–1)
GFR SERPL CREATININE-BSD FRML MDRD: 37 ML/MIN/1.73M2
GLUCOSE SERPL-MCNC: 76 MG/DL (ref 74–99)
POTASSIUM SERPL-SCNC: 4.3 MMOL/L (ref 3.5–5)
SODIUM SERPL-SCNC: 140 MMOL/L (ref 132–146)

## 2024-04-29 PROCEDURE — 80048 BASIC METABOLIC PNL TOTAL CA: CPT

## 2024-04-29 PROCEDURE — 83880 ASSAY OF NATRIURETIC PEPTIDE: CPT

## 2024-04-29 PROCEDURE — 77063 BREAST TOMOSYNTHESIS BI: CPT

## 2024-04-29 PROCEDURE — 99214 OFFICE O/P EST MOD 30 MIN: CPT

## 2024-04-29 PROCEDURE — 36415 COLL VENOUS BLD VENIPUNCTURE: CPT

## 2024-04-29 NOTE — PROGRESS NOTES
Congestive Heart Failure Clinic   [facility]       Referring Provider: Dr.Pranav Lema  Primary Care Physician: Tiffanie Tineo PA-C   Cardiologist:   Nephrologist: n/a      HISTORY OF PRESENT ILLNESS:     Milka Quinteros is a 69 y.o. (1955) female with a history of HFrEF (EF < 40%), most recent EF:  Lab Results   Component Value Date    LVEF 38 07/18/2022         She presents to the CHF clinic for ongoing evaluation and monitoring of heart failure.    In the CHF clinic today she denies any adverse symptoms except:  [] Dizziness or lightheadedness ( with position change )  [] Syncope or near syncope  [] Recent Fall  [] Shortness of breath at rest   [x] Dyspnea with exertion( rarely )  [] Decline in functional capacity (unable to perform activities they had previously been able to do)  [] Fatigue   [] Orthopnea  [] PND  [] Nocturnal cough  [] Hemoptysis  [] Chest pain, pressure, or discomfort  [] Palpitations  [] Abdominal distention  [] Abdominal bloating  [] Early satiety  [] Blood in stool   [] Diarrhea (occasionally )  [] Constipation (occasionally )  [] Nausea/Vomiting  [] Decreased urinary response to oral diuretic   [] Scrotal swelling   [] Lower extremity edema  [] Used PRN doses of oral diuretic   [] Weight gain    Wt Readings from Last 3 Encounters:   04/29/24 56.7 kg (125 lb)   04/29/24 57.2 kg (126 lb)   04/25/24 57.2 kg (126 lb)           SOCIAL HISTORY:  [] Denies tobacco, alcohol or illicit drug abuse  [x] Tobacco use: 1/2 pack a day  [] ETOH use:  [] Illicit drug use:        MEDICATIONS:    Allergies   Allergen Reactions    Pcn [Penicillins]      Prior to Visit Medications    Medication Sig Taking? Authorizing Provider   meloxicam (MOBIC) 7.5 MG tablet Take 1 tablet by mouth daily  Patient not taking: Reported on 4/29/2024  Provider, MD Catalina   LORazepam (ATIVAN) 0.5 MG tablet Take 1 tablet by mouth every 8 hours as needed for Anxiety for up to 30

## 2024-04-29 NOTE — RESULT ENCOUNTER NOTE
CHF clinic visit and labs reviewed   VS:146/58, pulse 50  Euvolemic on exam, denies symptoms     >>8165>>5104  BUN 34>>19>>28  Cr 1.7>>1.2>>1.5     GDMT:   Entresto 97/103 mg twice daily   Toprol  mg twice daily   Spironolactone 25 mg daily   Jardiance 10 mg daily     Bumex 1 mg daily     May take an additional 1 mg of Bumex if needed  Remainder of medications the same  Follow up as scheduled, sooner if needed

## 2024-04-29 NOTE — PLAN OF CARE
Problem: Chronic Conditions and Co-morbidities  Goal: Patient's chronic conditions and co-morbidity symptoms are monitored and maintained or improved  Flowsheets (Taken 4/29/2024 1154)  Care Plan - Patient's Chronic Conditions and Co-Morbidity Symptoms are Monitored and Maintained or Improved: Monitor and assess patient's chronic conditions and comorbid symptoms for stability, deterioration, or improvement

## 2024-04-30 ENCOUNTER — TELEPHONE (OUTPATIENT)
Dept: PRIMARY CARE CLINIC | Age: 69
End: 2024-04-30

## 2024-04-30 NOTE — TELEPHONE ENCOUNTER
----- Message from Myriam Gambino sent at 4/30/2024  3:34 PM EDT -----  Subject: Refill Request    QUESTIONS  Name of Medication? traMADol (ULTRAM) 50 MG tablet  Patient-reported dosage and instructions? 50 MG  How many days do you have left? 0  Preferred Pharmacy? [facility]  Pharmacy phone number (if available)? [phone]  Additional Information for Provider? Patient says she was in to see Dr. Tineo. She prescribed this medication for her. She cannot get a ride. She   wants to know if she can get another script for this until she can get her   MRI. She finished the last one today. In pain. Please call to advise.   ---------------------------------------------------------------------------  --------------  CALL BACK INFO  What is the best way for the office to contact you? OK to leave message on   voicemail  Preferred Call Back Phone Number? [phone]  ---------------------------------------------------------------------------  --------------  SCRIPT ANSWERS  Relationship to Patient? Self

## 2024-05-20 DIAGNOSIS — I42.9 CARDIOMYOPATHY, UNSPECIFIED TYPE (HCC): ICD-10-CM

## 2024-05-20 RX ORDER — METOPROLOL SUCCINATE 100 MG/1
100 TABLET, EXTENDED RELEASE ORAL 2 TIMES DAILY
Qty: 60 TABLET | Refills: 3 | Status: SHIPPED | OUTPATIENT
Start: 2024-05-20

## 2024-05-20 RX ORDER — BUMETANIDE 0.5 MG/1
TABLET ORAL
Qty: 90 TABLET | Refills: 0 | Status: SHIPPED | OUTPATIENT
Start: 2024-05-20

## 2024-05-29 ENCOUNTER — HOSPITAL ENCOUNTER (OUTPATIENT)
Dept: MRI IMAGING | Age: 69
Discharge: HOME | End: 2024-05-31

## 2024-05-29 ENCOUNTER — TELEPHONE (OUTPATIENT)
Dept: PRIMARY CARE CLINIC | Age: 69
End: 2024-05-29

## 2024-05-29 DIAGNOSIS — M54.2 NECK PAIN: ICD-10-CM

## 2024-05-29 DIAGNOSIS — M54.9 UPPER BACK PAIN ON LEFT SIDE: ICD-10-CM

## 2024-05-29 DIAGNOSIS — I42.9 CARDIOMYOPATHY, UNSPECIFIED TYPE (HCC): ICD-10-CM

## 2024-05-29 RX ORDER — METOPROLOL SUCCINATE 100 MG/1
100 TABLET, EXTENDED RELEASE ORAL 2 TIMES DAILY
Qty: 60 TABLET | Refills: 3 | OUTPATIENT
Start: 2024-05-29

## 2024-05-29 NOTE — TELEPHONE ENCOUNTER
Radiology dept calling they had to cancel MRI for today, pt given central scheduling to reschedule for an open MRI     FYI

## 2024-06-14 DIAGNOSIS — Z79.899 NEW MEDICATION ADDED: ICD-10-CM

## 2024-06-14 DIAGNOSIS — I50.9 CONGESTIVE HEART FAILURE, UNSPECIFIED HF CHRONICITY, UNSPECIFIED HEART FAILURE TYPE (HCC): ICD-10-CM

## 2024-06-14 DIAGNOSIS — Z79.899 MEDICATION DOSE INCREASED: ICD-10-CM

## 2024-06-14 RX ORDER — BUMETANIDE 1 MG/1
TABLET ORAL
Qty: 30 TABLET | Refills: 3 | Status: SHIPPED | OUTPATIENT
Start: 2024-06-14

## 2024-06-14 RX ORDER — PRIMIDONE 50 MG/1
TABLET ORAL
Qty: 30 TABLET | Refills: 0 | Status: SHIPPED | OUTPATIENT
Start: 2024-06-14

## 2024-06-14 RX ORDER — SACUBITRIL AND VALSARTAN 97; 103 MG/1; MG/1
TABLET, FILM COATED ORAL
Qty: 60 TABLET | Refills: 2 | Status: SHIPPED | OUTPATIENT
Start: 2024-06-14

## 2024-06-14 RX ORDER — EMPAGLIFLOZIN 10 MG/1
10 TABLET, FILM COATED ORAL DAILY
Qty: 30 TABLET | Refills: 3 | Status: SHIPPED | OUTPATIENT
Start: 2024-06-14

## 2024-06-14 NOTE — TELEPHONE ENCOUNTER
Pediatric Otolaryngology - Head and Neck Surgery History and Physical    History Of Present Illness  Diana Calloway is a 11 y.o. male presenting for a follow-up ear check. He is s/p BMT on 1/6/2023.      No infections but he does get waxy. They used drops to help this.   Grandmother notes the left tube has been extruded.   Has had recent colds this winter.      8/14/23  DIANA is a 10 year old male, accompanied by is grandmother who presents for a post-op visit following a bilateral T-tube placement on 1/6/2023. Patient is doing well. he felt his left tube come out     02/08/2023:  DIANA is a 10 year old male, accompanied by his mother and sibling, who presents for a post-op visit following a bilateral T-tube placement on 1/6/2023. This is his second set of ear tubes. Mom reports she has noticed that he is able to hear much better. No issues recovering.      11/28/2022:  DINAA is a 10 year old male, accompanied by his guardian/grandmother, who presents for a follow up ear check. He has had 2 sets of tubes, with the last placed in 2020. Patient had 2 ear infections since last seen. He is complaining of troubles hearing and he is having to turn the volume up on the T.V due to this. His last hearing assessment was 1 year ago. His grades are good in school. She has not heard anything from his teachers regarding his hearing, though the patient states that his teachers have brought this up to him in class. His mother did not have issues with her ears.      5/13/2022:  This is a 9-year-old here for urgent follow-up for a right-sided ear infection he was seen in urgent care on April 30 and they noted a right-sided ear infection he completed course of antibiotics and is feeling better his main complaint was ear pain and congestion. No other complaints he has a history of earwax he had tubes laced in 2020 02/21/2022:  Diana presents for a follow up. He presents with complaints of ear popping. There have been no ear  Phoned [facility] they will send office note and type of MRI they are requesting, they want it sent to [facility]  [phone] PHONE [phone]   "infections or drainage. He is sleeping well.         11/15/2021:  Referred by: self     DIANA is a 9 year old male, accompanied by his mother, presenting as a new patient for ear tube check-up. He had ear tubes placed in 3/9/2020 at the [facility]. He also had adenoidectomy, and sleep endoscopy. He recently has a slight case of pneumonia. He has been in the custody of his grandparents since 9/2021. He has two siblings. There are no hearing concerns, and he sleeps very well. He is seeing a GI for his pancreas. He has not had any colds recently. His sister has had many ear infections.        Past Medical History  He has a past medical history of Personal history of other diseases of the digestive system.    Surgical History  He has no past surgical history on file.     Social History  He reports that he does not have a smoking history on file. He has been exposed to tobacco smoke. He does not have any smokeless tobacco history on file. No history on file for alcohol use and drug use.    Family History  No family history on file.     Allergies  Patient has no known allergies.    Review of Systems  A 12-point review of systems was performed and noted be negative except for that which was mentioned in the history of present illness     Last Recorded Vitals  Blood pressure (!) 90/55, pulse 72, temperature 37.2 °C (99 °F), temperature source Temporal, resp. rate 22, height (!) 1.23 m (4' 0.43\"), weight 23 kg, SpO2 100%.     PHYSICAL EXAMINATION:  General Healthy-appearing, well-nourished, well groomed, in no acute distress.   Neuro: Developmentally appropriate for age. Reacts appropriately to commands or stimuli.   Extremities Normal. Good tone.  Respiratory No increased work of breathing. Chest expands symmetrically. No stertor or stridor at rest.  Cardiovascular: No peripheral cyanosis. No jugular venous distension.   Head and Face: Atraumatic with no masses, lesions, or scarring. Salivary glands normal without " tenderness or palpable masses.  Eyes: EOM intact, conjunctiva non-injected, sclera white.   Ears:  External inspection of ears:  Right Ear  Right pinna normally formed and free of lesions. No preauricular pits. No mastoid tenderness.  Otoscopic examination: right auditory canal has normal appearance and no significant cerumen obstruction. No erythema. Tympanic membrane is mobile per pneumatic otoscopy, translucent, with clear landmarks and no evidence of middle ear effusion.   Left Ear  Left pinna normally formed and free of lesions. No preauricular pits. No mastoid tenderness.  Otoscopic examination: Left auditory canal has normal appearance and no significant cerumen obstruction. No erythema. Tympanic membrane is mobile per pneumatic otoscopy, translucent, with clear landmarks and no evidence of middle ear effusion.   Nose: no external nasal lesions, lacerations, or scars. Nasal mucosa normal, pink and moist. Septum not markedly deformed. Turbinates normal in size. No obvious polyps.   Oral Cavity: Lips, tongue, teeth, and gums: mucous membranes moist, no lesions  Oropharynx: Mucosa moist, no lesions. Soft palate normal. Normal posterior pharyngeal wall. Tonsils 2+.   Neck: Symmetrical, trachea midline. No enlarged cervical lymph nodes.   Skin: Normal without rashes or lesions.    Medications:  Scheduled medications    Continuous medications    PRN medications      Recent Labs:  No results found for this or any previous visit (from the past 24 hour(s)).         Operative Plan:    Plan for bilateral ear tubes    Ezra Andrade MD  PGY4  Otolaryngology - Head and Neck Surgery

## 2024-07-11 ENCOUNTER — TELEPHONE (OUTPATIENT)
Dept: PRIMARY CARE CLINIC | Age: 69
End: 2024-07-11

## 2024-07-11 NOTE — TELEPHONE ENCOUNTER
Patient phoned in complaining of alot of PND and losing her voice which she believes is flaring up her asthma.  She is using her inhalers, nebulizer, allergy pills, and nasal spray more often then usual.   Patient going to [facility] to be evaluated.

## 2024-07-18 ENCOUNTER — HOSPITAL ENCOUNTER (OUTPATIENT)
Dept: OTHER | Age: 69
Setting detail: THERAPIES SERIES
Discharge: HOME | End: 2024-07-18

## 2024-07-23 DIAGNOSIS — Z79.899 NEW MEDICATION ADDED: ICD-10-CM

## 2024-07-23 RX ORDER — PRIMIDONE 50 MG/1
TABLET ORAL
Qty: 30 TABLET | Refills: 1 | Status: SHIPPED | OUTPATIENT
Start: 2024-07-23

## 2024-07-23 RX ORDER — ALLOPURINOL 100 MG/1
TABLET ORAL
Qty: 60 TABLET | Refills: 1 | Status: SHIPPED | OUTPATIENT
Start: 2024-07-23

## 2024-07-25 ENCOUNTER — HOSPITAL ENCOUNTER (OUTPATIENT)
Dept: OTHER | Age: 69
Setting detail: THERAPIES SERIES
Discharge: HOME | End: 2024-07-25

## 2024-07-31 ENCOUNTER — HOSPITAL ENCOUNTER (OUTPATIENT)
Age: 69
Discharge: HOME | End: 2024-07-31
Payer: MEDICARE

## 2024-07-31 PROCEDURE — 86481 TB AG RESPONSE T-CELL SUSP: CPT

## 2024-07-31 PROCEDURE — 36415 COLL VENOUS BLD VENIPUNCTURE: CPT

## 2024-08-04 LAB — T SPOT TB TEST: NORMAL

## 2024-08-08 ENCOUNTER — HOSPITAL ENCOUNTER (OUTPATIENT)
Dept: OTHER | Age: 69
Setting detail: THERAPIES SERIES
Discharge: HOME | End: 2024-08-08
Payer: MEDICARE

## 2024-08-08 VITALS
SYSTOLIC BLOOD PRESSURE: 163 MMHG | BODY MASS INDEX: 19.8 KG/M2 | RESPIRATION RATE: 18 BRPM | HEART RATE: 61 BPM | OXYGEN SATURATION: 98 % | WEIGHT: 119 LBS | DIASTOLIC BLOOD PRESSURE: 62 MMHG

## 2024-08-08 LAB
ANION GAP SERPL CALCULATED.3IONS-SCNC: 9 MMOL/L (ref 7–16)
BNP SERPL-MCNC: 5973 PG/ML (ref 0–125)
BUN SERPL-MCNC: 21 MG/DL (ref 6–23)
CALCIUM SERPL-MCNC: 9.1 MG/DL (ref 8.6–10.2)
CHLORIDE SERPL-SCNC: 107 MMOL/L (ref 98–107)
CO2 SERPL-SCNC: 24 MMOL/L (ref 22–29)
CREAT SERPL-MCNC: 1.2 MG/DL (ref 0.5–1)
GFR, ESTIMATED: 52 ML/MIN/1.73M2
GLUCOSE SERPL-MCNC: 83 MG/DL (ref 74–99)
POTASSIUM SERPL-SCNC: 4 MMOL/L (ref 3.5–5)
SODIUM SERPL-SCNC: 140 MMOL/L (ref 132–146)

## 2024-08-08 PROCEDURE — 80048 BASIC METABOLIC PNL TOTAL CA: CPT

## 2024-08-08 PROCEDURE — 83880 ASSAY OF NATRIURETIC PEPTIDE: CPT

## 2024-08-08 PROCEDURE — 99214 OFFICE O/P EST MOD 30 MIN: CPT

## 2024-08-08 RX ORDER — ISOSORBIDE DINITRATE 5 MG/1
5 TABLET ORAL 3 TIMES DAILY
Qty: 90 TABLET | Refills: 3 | Status: SHIPPED | OUTPATIENT
Start: 2024-08-08

## 2024-08-08 RX ORDER — HYDRALAZINE HYDROCHLORIDE 10 MG/1
10 TABLET, FILM COATED ORAL 3 TIMES DAILY
Qty: 90 TABLET | Refills: 3 | Status: SHIPPED | OUTPATIENT
Start: 2024-08-08

## 2024-08-08 ASSESSMENT — PATIENT HEALTH QUESTIONNAIRE - PHQ9
1. LITTLE INTEREST OR PLEASURE IN DOING THINGS: NOT AT ALL
SUM OF ALL RESPONSES TO PHQ QUESTIONS 1-9: 0
2. FEELING DOWN, DEPRESSED OR HOPELESS: NOT AT ALL
SUM OF ALL RESPONSES TO PHQ QUESTIONS 1-9: 0
SUM OF ALL RESPONSES TO PHQ9 QUESTIONS 1 & 2: 0
SUM OF ALL RESPONSES TO PHQ QUESTIONS 1-9: 0
SUM OF ALL RESPONSES TO PHQ QUESTIONS 1-9: 0

## 2024-08-08 NOTE — PLAN OF CARE
Problem: Chronic Conditions and Co-morbidities  Goal: Patient's chronic conditions and co-morbidity symptoms are monitored and maintained or improved  Flowsheets (Taken 8/8/2024 1047)  Care Plan - Patient's Chronic Conditions and Co-Morbidity Symptoms are Monitored and Maintained or Improved: Monitor and assess patient's chronic conditions and comorbid symptoms for stability, deterioration, or improvement

## 2024-08-08 NOTE — RESULT ENCOUNTER NOTE
Labs and CHF Clinic note reviewed  Vitals: 163/62, 61    Please have her start hydralazine 10 mg three times daily   Isordil 5 mg TID    Follow up in CHF clinic in 1 month    Thank you

## 2024-08-13 ENCOUNTER — HOSPITAL ENCOUNTER (OUTPATIENT)
Dept: OTHER | Age: 69
Setting detail: THERAPIES SERIES
Discharge: HOME | End: 2024-08-13
Payer: MEDICARE

## 2024-08-13 VITALS
RESPIRATION RATE: 18 BRPM | WEIGHT: 121 LBS | BODY MASS INDEX: 20.14 KG/M2 | SYSTOLIC BLOOD PRESSURE: 162 MMHG | HEART RATE: 54 BPM | DIASTOLIC BLOOD PRESSURE: 80 MMHG

## 2024-08-13 PROCEDURE — 99214 OFFICE O/P EST MOD 30 MIN: CPT

## 2024-08-13 NOTE — PLAN OF CARE
Problem: Chronic Conditions and Co-morbidities  Goal: Patient's chronic conditions and co-morbidity symptoms are monitored and maintained or improved  Flowsheets (Taken 8/13/2024 1022)  Care Plan - Patient's Chronic Conditions and Co-Morbidity Symptoms are Monitored and Maintained or Improved: Monitor and assess patient's chronic conditions and comorbid symptoms for stability, deterioration, or improvement

## 2024-08-13 NOTE — PROGRESS NOTES
tablet Take 1 tablet by mouth 3 times daily  Patient not taking: Reported on 4/29/2024  Tiffanie Tineo PA-C          GUIDELINE DIRECTED MEDICAL THERAPY for HFrEF/HFmrEF:  ARNI/ACE I/ARB: (1a indication)  Entresto 97/103 mg BID  Hydralazine/Nitrates (1a in symptomatic AA population, 2b if unable to tolerate ACE/ARB/ARNI)  No  Beta blocker: (1a indication)  Metoprolol Succinate (Toprol)  Aldosterone antagonist: (1a indication)  Spironolactone 12.5 mg daily  SGLT2i: (1a indication)  Jardiance 10 mg daily    If Channel inhibitor (2a indication if HR >70 on maximally tolerated beta blocker)  No  Cardiac glycoside (2b indication for symptomatic HFrEF)  No  Soluble Guanylate Cyclase (sGC) Stimulator (2b indication LVEF <45% with recent HFH, IV diuretics or elevated BNP)  No  Potassium binders (2b indication for hyperkalemia while taking RAASi)  No              HEART FAILURE FOCUSED PHYSICAL EXAMINATION:    Vitals:    08/13/24 1020   BP: (!) 162/80   Pulse:    Resp:         Assessment  Charting Type: Shift assessment        HEENT (Head, Ears, Eyes, Nose, & Throat)  HEENT (WDL): Exceptions to WDL  Right Eye: Glasses  Left Eye: Glasses  Respiratory  Respiratory Pattern: Regular  Respiratory Depth: Normal  Respiratory Quality/Effort: Unlabored  Chest Assessment: Chest expansion symmetrical  L Breath Sounds: Diminished, Clear  R Breath Sounds: Diminished, Clear  Breath Sounds  Respiratory Pattern: Regular     Cardiac  Cardiac Regularity: Regular  Cardiac Rhythm: Sinus rhythm  Rhythm Interpretation  Cardiac Rhythm: Sinus rhythm  Pulse: 54     Gastrointestinal  Abdominal (WDL): Within Defined Limits         Peripheral Vascular  Peripheral Vascular (WDL): Within Defined Limits  Edema: None                             Pulse: 54               LAB DATA:  BMP:  Sodium (mmol/L)   Date Value   08/08/2024 140   04/29/2024 140   04/25/2024 141     Potassium (mmol/L)   Date Value   08/08/2024 4.0   04/29/2024 4.3   04/25/2024 4.2

## 2024-08-19 ENCOUNTER — HOSPITAL ENCOUNTER (OUTPATIENT)
Dept: OTHER | Age: 69
Setting detail: THERAPIES SERIES
Discharge: HOME | End: 2024-08-19
Payer: MEDICARE

## 2024-08-19 ENCOUNTER — TELEPHONE (OUTPATIENT)
Dept: OTHER | Age: 69
End: 2024-08-19

## 2024-08-19 VITALS
SYSTOLIC BLOOD PRESSURE: 160 MMHG | OXYGEN SATURATION: 99 % | WEIGHT: 121 LBS | RESPIRATION RATE: 18 BRPM | HEART RATE: 63 BPM | DIASTOLIC BLOOD PRESSURE: 54 MMHG | BODY MASS INDEX: 20.14 KG/M2

## 2024-08-19 DIAGNOSIS — I50.9 CONGESTIVE HEART FAILURE, UNSPECIFIED HF CHRONICITY, UNSPECIFIED HEART FAILURE TYPE (HCC): ICD-10-CM

## 2024-08-19 LAB
ANION GAP SERPL CALCULATED.3IONS-SCNC: 11 MMOL/L (ref 7–16)
BNP SERPL-MCNC: 3147 PG/ML (ref 0–125)
BUN SERPL-MCNC: 23 MG/DL (ref 6–23)
CALCIUM SERPL-MCNC: 9 MG/DL (ref 8.6–10.2)
CHLORIDE SERPL-SCNC: 106 MMOL/L (ref 98–107)
CO2 SERPL-SCNC: 25 MMOL/L (ref 22–29)
CREAT SERPL-MCNC: 1.9 MG/DL (ref 0.5–1)
GFR, ESTIMATED: 29 ML/MIN/1.73M2
GLUCOSE SERPL-MCNC: 73 MG/DL (ref 74–99)
POTASSIUM SERPL-SCNC: 4.6 MMOL/L (ref 3.5–5)
SODIUM SERPL-SCNC: 142 MMOL/L (ref 132–146)

## 2024-08-19 PROCEDURE — 99214 OFFICE O/P EST MOD 30 MIN: CPT

## 2024-08-19 PROCEDURE — 80048 BASIC METABOLIC PNL TOTAL CA: CPT

## 2024-08-19 PROCEDURE — 83880 ASSAY OF NATRIURETIC PEPTIDE: CPT

## 2024-08-19 RX ORDER — HYDRALAZINE HYDROCHLORIDE 25 MG/1
25 TABLET, FILM COATED ORAL 3 TIMES DAILY
Qty: 90 TABLET | Refills: 3 | Status: SHIPPED | OUTPATIENT
Start: 2024-08-19

## 2024-08-19 NOTE — PROGRESS NOTES
provided community resources for counseling services  [] PCP called and PHQ result faxed   [] Behavorial health consultant called      Future Appointments   Date Time Provider Department Center   9/12/2024 10:00 AM [facility] CHF ROOM 2 [facility]   11/14/2024  9:15 AM [facility] CHF ROOM 3 [facility]

## 2024-08-19 NOTE — RESULT ENCOUNTER NOTE
Labs and CHF clinic note reviewed  Vitals: 160/54, 63    Increase hydralazine to 25 mg TID  Follow up as scheduled in CHF clinic     Thank you

## 2024-08-19 NOTE — TELEPHONE ENCOUNTER
2:52 PM Called pt regarding med change Per AILEEN De La Rosa-CNP      Labs and CHF clinic note reviewed  Vitals: 160/54, 63     Increase hydralazine to 25 mg TID  Follow up as scheduled in CHF clinic      Thank you     Pt states she will  today or tomorrow.   Instructed pt to call clinic with any questions.   Electronically signed by Baldemar Kirk RN on 8/19/2024 at 2:53 PM

## 2024-08-20 RX ORDER — ASPIRIN 81 MG/1
81 TABLET, COATED ORAL DAILY
Qty: 30 TABLET | Refills: 5 | Status: SHIPPED | OUTPATIENT
Start: 2024-08-20

## 2024-09-11 NOTE — TELEPHONE ENCOUNTER
----- Message from Molly Mills sent at 9/4/2021 12:33 PM EDT -----  Subject: Refill Request    QUESTIONS  Name of Medication? montelukast (SINGULAIR) 10 MG tablet  Patient-reported dosage and instructions? 10 mg once a day  How many days do you have left? 3  Preferred Pharmacy? [id number] [place] [address]. Pharmacy phone number (if available)? [phone]  ---------------------------------------------------------------------------  --------------  CALL BACK INFO  What is the best way for the office to contact you? OK to leave message on   voicemail  Preferred Call Back Phone Number?  [phone] Attending Attestation (For Attendings USE Only)...

## 2024-09-12 ENCOUNTER — HOSPITAL ENCOUNTER (OUTPATIENT)
Dept: OTHER | Age: 69
Setting detail: THERAPIES SERIES
Discharge: HOME | End: 2024-09-12
Payer: MEDICARE

## 2024-09-12 VITALS
SYSTOLIC BLOOD PRESSURE: 154 MMHG | RESPIRATION RATE: 18 BRPM | WEIGHT: 118 LBS | DIASTOLIC BLOOD PRESSURE: 54 MMHG | BODY MASS INDEX: 19.64 KG/M2 | HEART RATE: 56 BPM | OXYGEN SATURATION: 100 %

## 2024-09-12 LAB
ANION GAP SERPL CALCULATED.3IONS-SCNC: 12 MMOL/L (ref 7–16)
BNP SERPL-MCNC: 4361 PG/ML (ref 0–125)
BUN SERPL-MCNC: 23 MG/DL (ref 6–23)
CALCIUM SERPL-MCNC: 9.7 MG/DL (ref 8.6–10.2)
CHLORIDE SERPL-SCNC: 104 MMOL/L (ref 98–107)
CO2 SERPL-SCNC: 25 MMOL/L (ref 22–29)
CREAT SERPL-MCNC: 1.4 MG/DL (ref 0.5–1)
GFR, ESTIMATED: 41 ML/MIN/1.73M2
GLUCOSE SERPL-MCNC: 78 MG/DL (ref 74–99)
POTASSIUM SERPL-SCNC: 4.4 MMOL/L (ref 3.5–5)
SODIUM SERPL-SCNC: 141 MMOL/L (ref 132–146)

## 2024-09-12 PROCEDURE — 83880 ASSAY OF NATRIURETIC PEPTIDE: CPT

## 2024-09-12 PROCEDURE — 80048 BASIC METABOLIC PNL TOTAL CA: CPT

## 2024-09-12 PROCEDURE — 36415 COLL VENOUS BLD VENIPUNCTURE: CPT

## 2024-09-12 PROCEDURE — 99214 OFFICE O/P EST MOD 30 MIN: CPT

## 2024-09-13 RX ORDER — HYDRALAZINE HYDROCHLORIDE 50 MG/1
50 TABLET, FILM COATED ORAL 3 TIMES DAILY
Qty: 90 TABLET | Refills: 3 | Status: SHIPPED | OUTPATIENT
Start: 2024-09-13

## 2024-09-13 NOTE — PROGRESS NOTES
Date of Service: 9/12/2024 10:00 AM     Signed         Labs and CHF clinic note reviewed  Vitals: BP:(!) 154/54  Pulse:56     Increase hydralazine 50 mg TID  Follow up as scheduled                 Patient notified of above. Demonstrates understanding .

## 2024-09-23 DIAGNOSIS — Z79.899 NEW MEDICATION ADDED: ICD-10-CM

## 2024-09-23 DIAGNOSIS — I50.9 CONGESTIVE HEART FAILURE, UNSPECIFIED HF CHRONICITY, UNSPECIFIED HEART FAILURE TYPE (HCC): ICD-10-CM

## 2024-09-23 DIAGNOSIS — I42.9 CARDIOMYOPATHY, UNSPECIFIED TYPE (HCC): ICD-10-CM

## 2024-09-23 DIAGNOSIS — Z79.899 MEDICATION DOSE INCREASED: ICD-10-CM

## 2024-09-23 DIAGNOSIS — E78.00 PURE HYPERCHOLESTEROLEMIA: ICD-10-CM

## 2024-09-23 RX ORDER — PRIMIDONE 50 MG/1
TABLET ORAL
Qty: 30 TABLET | Refills: 1 | Status: SHIPPED | OUTPATIENT
Start: 2024-09-23

## 2024-09-23 RX ORDER — ATORVASTATIN CALCIUM 40 MG/1
TABLET, FILM COATED ORAL
Qty: 30 TABLET | Refills: 3 | Status: SHIPPED | OUTPATIENT
Start: 2024-09-23

## 2024-09-23 RX ORDER — SACUBITRIL AND VALSARTAN 97; 103 MG/1; MG/1
TABLET, FILM COATED ORAL
Qty: 60 TABLET | Refills: 2 | Status: SHIPPED | OUTPATIENT
Start: 2024-09-23

## 2024-09-23 RX ORDER — ALLOPURINOL 100 MG/1
TABLET ORAL
Qty: 60 TABLET | Refills: 1 | Status: SHIPPED | OUTPATIENT
Start: 2024-09-23

## 2024-09-24 RX ORDER — METOPROLOL SUCCINATE 100 MG/1
100 TABLET, EXTENDED RELEASE ORAL 2 TIMES DAILY
Qty: 60 TABLET | Refills: 3 | Status: SHIPPED | OUTPATIENT
Start: 2024-09-24

## 2024-10-02 ENCOUNTER — TELEPHONE (OUTPATIENT)
Dept: PRIMARY CARE CLINIC | Age: 69
End: 2024-10-02

## 2024-10-02 DIAGNOSIS — I50.41 ACUTE COMBINED SYSTOLIC AND DIASTOLIC CONGESTIVE HEART FAILURE (HCC): Primary | ICD-10-CM

## 2024-10-03 ENCOUNTER — HOSPITAL ENCOUNTER (OUTPATIENT)
Dept: OTHER | Age: 69
Setting detail: THERAPIES SERIES
Discharge: HOME | End: 2024-10-03

## 2024-10-07 ENCOUNTER — OFFICE VISIT (OUTPATIENT)
Dept: PRIMARY CARE CLINIC | Age: 69
End: 2024-10-07
Payer: MEDICARE

## 2024-10-07 VITALS
TEMPERATURE: 97.4 F | HEIGHT: 65 IN | HEART RATE: 66 BPM | OXYGEN SATURATION: 99 % | SYSTOLIC BLOOD PRESSURE: 120 MMHG | BODY MASS INDEX: 18.99 KG/M2 | WEIGHT: 114 LBS | DIASTOLIC BLOOD PRESSURE: 60 MMHG | RESPIRATION RATE: 16 BRPM

## 2024-10-07 DIAGNOSIS — J30.2 SEASONAL ALLERGIES: ICD-10-CM

## 2024-10-07 DIAGNOSIS — I10 PRIMARY HYPERTENSION: Primary | ICD-10-CM

## 2024-10-07 DIAGNOSIS — E78.00 PURE HYPERCHOLESTEROLEMIA: ICD-10-CM

## 2024-10-07 DIAGNOSIS — Z72.0 TOBACCO ABUSE: ICD-10-CM

## 2024-10-07 DIAGNOSIS — Z23 NEED FOR INFLUENZA VACCINATION: ICD-10-CM

## 2024-10-07 DIAGNOSIS — N18.32 STAGE 3B CHRONIC KIDNEY DISEASE (HCC): ICD-10-CM

## 2024-10-07 DIAGNOSIS — G25.0 ESSENTIAL TREMOR: ICD-10-CM

## 2024-10-07 DIAGNOSIS — J45.20 MILD INTERMITTENT ASTHMA WITHOUT COMPLICATION: ICD-10-CM

## 2024-10-07 DIAGNOSIS — Z86.2 HISTORY OF ANEMIA: ICD-10-CM

## 2024-10-07 DIAGNOSIS — I42.9 CARDIOMYOPATHY, UNSPECIFIED TYPE (HCC): ICD-10-CM

## 2024-10-07 DIAGNOSIS — I35.0 SEVERE AORTIC STENOSIS: ICD-10-CM

## 2024-10-07 DIAGNOSIS — Z12.11 COLON CANCER SCREENING: ICD-10-CM

## 2024-10-07 LAB
BASOPHILS ABSOLUTE: 0.02 K/UL (ref 0–0.2)
BASOPHILS RELATIVE PERCENT: 0 % (ref 0–2)
CHOLESTEROL, TOTAL: 151 MG/DL
EOSINOPHILS ABSOLUTE: 0.25 K/UL (ref 0.05–0.5)
EOSINOPHILS RELATIVE PERCENT: 3 % (ref 0–6)
HCT VFR BLD CALC: 41.2 % (ref 34–48)
HDLC SERPL-MCNC: 61 MG/DL
HEMOGLOBIN: 13.1 G/DL (ref 11.5–15.5)
IMMATURE GRANULOCYTES %: 0 % (ref 0–5)
IMMATURE GRANULOCYTES ABSOLUTE: <0.03 K/UL (ref 0–0.58)
IRON % SATURATION: 22 % (ref 15–50)
IRON: 58 UG/DL (ref 37–145)
LDL CHOLESTEROL: 66 MG/DL
LYMPHOCYTES ABSOLUTE: 2.01 K/UL (ref 1.5–4)
LYMPHOCYTES RELATIVE PERCENT: 26 % (ref 20–42)
MCH RBC QN AUTO: 33.2 PG (ref 26–35)
MCHC RBC AUTO-ENTMCNC: 31.8 G/DL (ref 32–34.5)
MCV RBC AUTO: 104.6 FL (ref 80–99.9)
MONOCYTES ABSOLUTE: 0.72 K/UL (ref 0.1–0.95)
MONOCYTES RELATIVE PERCENT: 9 % (ref 2–12)
NEUTROPHILS ABSOLUTE: 4.66 K/UL (ref 1.8–7.3)
NEUTROPHILS RELATIVE PERCENT: 61 % (ref 43–80)
PDW BLD-RTO: 13.2 % (ref 11.5–15)
PLATELET # BLD: 171 K/UL (ref 130–450)
PMV BLD AUTO: 11.9 FL (ref 7–12)
RBC # BLD: 3.94 M/UL (ref 3.5–5.5)
T4 FREE: 0.9 NG/DL (ref 0.9–1.7)
TOTAL IRON BINDING CAPACITY: 265 UG/DL (ref 250–450)
TRIGL SERPL-MCNC: 119 MG/DL
TSH SERPL DL<=0.05 MIU/L-ACNC: 1.13 UIU/ML (ref 0.27–4.2)
VLDLC SERPL CALC-MCNC: 24 MG/DL
WBC # BLD: 7.7 K/UL (ref 4.5–11.5)

## 2024-10-07 PROCEDURE — 90653 IIV ADJUVANT VACCINE IM: CPT | Performed by: PHYSICIAN ASSISTANT

## 2024-10-07 PROCEDURE — 3074F SYST BP LT 130 MM HG: CPT | Performed by: PHYSICIAN ASSISTANT

## 2024-10-07 PROCEDURE — G8420 CALC BMI NORM PARAMETERS: HCPCS | Performed by: PHYSICIAN ASSISTANT

## 2024-10-07 PROCEDURE — 3078F DIAST BP <80 MM HG: CPT | Performed by: PHYSICIAN ASSISTANT

## 2024-10-07 PROCEDURE — 1123F ACP DISCUSS/DSCN MKR DOCD: CPT | Performed by: PHYSICIAN ASSISTANT

## 2024-10-07 PROCEDURE — G8427 DOCREV CUR MEDS BY ELIG CLIN: HCPCS | Performed by: PHYSICIAN ASSISTANT

## 2024-10-07 PROCEDURE — G0008 ADMIN INFLUENZA VIRUS VAC: HCPCS | Performed by: PHYSICIAN ASSISTANT

## 2024-10-07 PROCEDURE — G8482 FLU IMMUNIZE ORDER/ADMIN: HCPCS | Performed by: PHYSICIAN ASSISTANT

## 2024-10-07 PROCEDURE — 4004F PT TOBACCO SCREEN RCVD TLK: CPT | Performed by: PHYSICIAN ASSISTANT

## 2024-10-07 PROCEDURE — 99214 OFFICE O/P EST MOD 30 MIN: CPT | Performed by: PHYSICIAN ASSISTANT

## 2024-10-07 PROCEDURE — 36415 COLL VENOUS BLD VENIPUNCTURE: CPT | Performed by: PHYSICIAN ASSISTANT

## 2024-10-07 PROCEDURE — G8400 PT W/DXA NO RESULTS DOC: HCPCS | Performed by: PHYSICIAN ASSISTANT

## 2024-10-07 PROCEDURE — 1090F PRES/ABSN URINE INCON ASSESS: CPT | Performed by: PHYSICIAN ASSISTANT

## 2024-10-07 PROCEDURE — 3017F COLORECTAL CA SCREEN DOC REV: CPT | Performed by: PHYSICIAN ASSISTANT

## 2024-10-07 RX ORDER — FLUTICASONE PROPIONATE 50 MCG
2 SPRAY, SUSPENSION (ML) NASAL DAILY
Qty: 16 G | Refills: 2 | Status: SHIPPED | OUTPATIENT
Start: 2024-10-07

## 2024-10-07 SDOH — ECONOMIC STABILITY: FOOD INSECURITY: WITHIN THE PAST 12 MONTHS, THE FOOD YOU BOUGHT JUST DIDN'T LAST AND YOU DIDN'T HAVE MONEY TO GET MORE.: NEVER TRUE

## 2024-10-07 SDOH — ECONOMIC STABILITY: FOOD INSECURITY: WITHIN THE PAST 12 MONTHS, YOU WORRIED THAT YOUR FOOD WOULD RUN OUT BEFORE YOU GOT MONEY TO BUY MORE.: NEVER TRUE

## 2024-10-07 SDOH — ECONOMIC STABILITY: INCOME INSECURITY: HOW HARD IS IT FOR YOU TO PAY FOR THE VERY BASICS LIKE FOOD, HOUSING, MEDICAL CARE, AND HEATING?: NOT HARD AT ALL

## 2024-10-07 NOTE — PROGRESS NOTES
Butterfly  right AC  hand x1 25g x 3/4  
well-developed.   HENT:      Head: Normocephalic and atraumatic.      Right Ear: External ear normal.      Left Ear: External ear normal.      Nose: Nose normal.   Eyes:      General: No scleral icterus.     Conjunctiva/sclera: Conjunctivae normal.      Pupils: Pupils are equal, round, and reactive to light.   Neck:      Thyroid: No thyromegaly.   Cardiovascular:      Rate and Rhythm: Normal rate and regular rhythm.      Heart sounds: Murmur (systolic) heard.   Pulmonary:      Effort: Pulmonary effort is normal. No accessory muscle usage or respiratory distress.      Breath sounds: Normal breath sounds. No wheezing.   Musculoskeletal:         General: Normal range of motion.      Cervical back: Normal range of motion and neck supple.   Skin:     General: Skin is warm and dry.      Findings: No rash.   Neurological:      Mental Status: She is alert and oriented to person, place, and time.      Deep Tendon Reflexes: Reflexes are normal and symmetric.   Psychiatric:         Mood and Affect: Mood and affect normal.         Speech: Speech normal.         Behavior: Behavior normal.         Assessment/Plan:      Milka was seen today for asthma, tremors and fall.    Diagnoses and all orders for this visit:    Primary hypertension    Stage 3b chronic kidney disease (HCC)    Severe aortic stenosis  -     Echo (TTE) complete (PRN contrast/bubble/strain/3D); Future    Cardiomyopathy, unspecified type (HCC)    Essential tremor  -     TSH  -     T4, Free    Pure hypercholesterolemia  -     Lipid Panel    Mild intermittent asthma without complication    Tobacco abuse    Colon cancer screening  -     Cologuard (Fecal DNA Colorectal Cancer Screening)    Need for influenza vaccination  -     Influenza, FLUAD Trivalent, (age 65 y+), IM, Preservative Free, 0.5mL    Seasonal allergies  -     fluticasone (FLONASE) 50 MCG/ACT nasal spray; 2 sprays by Each Nostril route daily    History of anemia  -     CBC with Auto Differential  -

## 2024-10-08 ENCOUNTER — TELEPHONE (OUTPATIENT)
Dept: CARDIOLOGY | Age: 69
End: 2024-10-08

## 2024-10-08 NOTE — TELEPHONE ENCOUNTER
CALLED PATIENT TO SCHEDULE ECHO. LEFT MESSAGE.    Electronically signed by Kindra Lilly on 10/8/2024 at 10:35 AM

## 2024-10-09 ENCOUNTER — TELEPHONE (OUTPATIENT)
Dept: PRIMARY CARE CLINIC | Age: 69
End: 2024-10-09

## 2024-10-09 NOTE — TELEPHONE ENCOUNTER
Patient phoned requesting results from 10/7/2024 blood work.  Patient states you were checking to see if she was anemic.    Please advise.

## 2024-10-09 NOTE — TELEPHONE ENCOUNTER
Patient notified.  She wants to know if she should take any type of vitamin because she's always cold.

## 2024-10-16 DIAGNOSIS — Z79.899 NEW MEDICATION ADDED: ICD-10-CM

## 2024-10-16 DIAGNOSIS — I50.9 CONGESTIVE HEART FAILURE, UNSPECIFIED HF CHRONICITY, UNSPECIFIED HEART FAILURE TYPE (HCC): ICD-10-CM

## 2024-10-16 RX ORDER — EMPAGLIFLOZIN 10 MG/1
10 TABLET, FILM COATED ORAL DAILY
Qty: 90 TABLET | Refills: 3 | Status: SHIPPED | OUTPATIENT
Start: 2024-10-16

## 2024-10-16 RX ORDER — BUMETANIDE 1 MG/1
TABLET ORAL
Qty: 30 TABLET | Refills: 3 | Status: SHIPPED | OUTPATIENT
Start: 2024-10-16

## 2024-10-16 NOTE — TELEPHONE ENCOUNTER
Name of Medication(s) Requested:  Requested Prescriptions     Pending Prescriptions Disp Refills    JARDIANCE 10 MG tablet [Pharmacy Med Name: JARDIANCE 10 MG TAB 10 Tablet] 30 tablet 3     Sig: TAKE 1 TABLET BY MOUTH EVERY DAY       Medication is on current medication list Yes    Dosage and directions were verified? Yes      Quantity verified: 30 day supply     Pharmacy Verified?  Yes    Last Appointment:  10/7/2024    Future appts:  Future Appointments   Date Time Provider Department Center   10/17/2024  9:15 AM [facility] ROOM 3 [facility]   11/6/2024  2:30 PM [facility] MARIO ECHO 1 SEYZ RUBIO [facility] Rad/Car   11/14/2024  9:15 AM [facility] access hospital [facility]OM [facility]        (If no appt send self scheduling link. .REFILLAPPT)  Scheduling request sent?     [] Yes  [] No    Does patient need updated?  [] Yes  [] No

## 2024-10-17 ENCOUNTER — HOSPITAL ENCOUNTER (OUTPATIENT)
Dept: OTHER | Age: 69
Setting detail: THERAPIES SERIES
Discharge: HOME | End: 2024-10-17
Payer: MEDICARE

## 2024-10-17 VITALS
WEIGHT: 114 LBS | HEART RATE: 59 BPM | OXYGEN SATURATION: 100 % | RESPIRATION RATE: 16 BRPM | SYSTOLIC BLOOD PRESSURE: 147 MMHG | DIASTOLIC BLOOD PRESSURE: 60 MMHG | BODY MASS INDEX: 18.97 KG/M2

## 2024-10-17 LAB
ANION GAP SERPL CALCULATED.3IONS-SCNC: 13 MMOL/L (ref 7–16)
BNP SERPL-MCNC: 4595 PG/ML (ref 0–125)
BUN SERPL-MCNC: 26 MG/DL (ref 6–23)
CALCIUM SERPL-MCNC: 10 MG/DL (ref 8.6–10.2)
CHLORIDE SERPL-SCNC: 104 MMOL/L (ref 98–107)
CO2 SERPL-SCNC: 23 MMOL/L (ref 22–29)
CREAT SERPL-MCNC: 1.3 MG/DL (ref 0.5–1)
GFR, ESTIMATED: 45 ML/MIN/1.73M2
GLUCOSE SERPL-MCNC: 80 MG/DL (ref 74–99)
POTASSIUM SERPL-SCNC: 4.2 MMOL/L (ref 3.5–5)
SODIUM SERPL-SCNC: 140 MMOL/L (ref 132–146)

## 2024-10-17 PROCEDURE — 83880 ASSAY OF NATRIURETIC PEPTIDE: CPT

## 2024-10-17 PROCEDURE — 80048 BASIC METABOLIC PNL TOTAL CA: CPT

## 2024-10-17 PROCEDURE — 99214 OFFICE O/P EST MOD 30 MIN: CPT

## 2024-10-17 PROCEDURE — 36415 COLL VENOUS BLD VENIPUNCTURE: CPT

## 2024-10-17 ASSESSMENT — PATIENT HEALTH QUESTIONNAIRE - PHQ9
SUM OF ALL RESPONSES TO PHQ QUESTIONS 1-9: 0
SUM OF ALL RESPONSES TO PHQ QUESTIONS 1-9: 0
2. FEELING DOWN, DEPRESSED OR HOPELESS: NOT AT ALL
1. LITTLE INTEREST OR PLEASURE IN DOING THINGS: NOT AT ALL
SUM OF ALL RESPONSES TO PHQ QUESTIONS 1-9: 0
SUM OF ALL RESPONSES TO PHQ QUESTIONS 1-9: 0
SUM OF ALL RESPONSES TO PHQ9 QUESTIONS 1 & 2: 0

## 2024-10-17 NOTE — PROGRESS NOTES
`    Congestive Heart Failure Clinic   [facility]       Referring Provider: Dr.Pranav Lema  Primary Care Physician: Tiffanie Tineo PA-C   Cardiologist:   Nephrologist: n/a      HISTORY OF PRESENT ILLNESS:     Milka Quinteros is a 69 y.o. (1955) female with a history of HFrEF (EF < 40%), most recent EF:  Lab Results   Component Value Date    LVEF 38 07/18/2022         She presents to the CHF clinic for ongoing evaluation and monitoring of heart failure.    In the CHF clinic today she denies any adverse symptoms except:  [] Dizziness or lightheadedness ( with position change )  [] Syncope or near syncope  [] Recent Fall  [] Shortness of breath at rest   [x] Dyspnea with exertion( rarely )  [] Decline in functional capacity (unable to perform activities they had previously been able to do)  [] Fatigue   [] Orthopnea  [] PND  [] Nocturnal cough  [] Hemoptysis  [] Chest pain, pressure, or discomfort  [] Palpitations  [] Abdominal distention  [] Abdominal bloating  [] Early satiety  [] Blood in stool   [] Diarrhea (occasionally )  [] Constipation (occasionally )  [] Nausea/Vomiting  [] Decreased urinary response to oral diuretic   [] Scrotal swelling   [] Lower extremity edema  [] Used PRN doses of oral diuretic   [] Weight gain    Wt Readings from Last 3 Encounters:   10/17/24 51.7 kg (114 lb)   10/07/24 51.7 kg (114 lb)   09/12/24 53.5 kg (118 lb)           SOCIAL HISTORY:  [] Denies tobacco, alcohol or illicit drug abuse  [x] Tobacco use: 1/2 pack a day  [] ETOH use:  [] Illicit drug use:        MEDICATIONS:    Allergies   Allergen Reactions    Pcn [Penicillins]      Prior to Visit Medications    Medication Sig Taking? Authorizing Provider   empagliflozin (JARDIANCE) 10 MG tablet TAKE 1 TABLET BY MOUTH EVERY DAY Yes Tiffanie Tineo PA-C   bumetanide (BUMEX) 1 MG tablet TAKE ONE (1) TABLET DAILY. Yes Raf Hendrickson MD   fluticasone (FLONASE) 50 MCG/ACT nasal spray 2 sprays by

## 2024-10-29 ENCOUNTER — TELEPHONE (OUTPATIENT)
Dept: PRIMARY CARE CLINIC | Age: 69
End: 2024-10-29

## 2024-10-29 RX ORDER — DOXYCYCLINE HYCLATE 100 MG
100 TABLET ORAL 2 TIMES DAILY
Qty: 14 TABLET | Refills: 0 | Status: SHIPPED | OUTPATIENT
Start: 2024-10-29 | End: 2024-11-05

## 2024-10-29 NOTE — TELEPHONE ENCOUNTER
Pt calling c/o sinus inf keep blowing her nose and has congestion( yellowish )wants to know if you can call her something in     Please advise?

## 2024-11-13 DIAGNOSIS — Z79.899 NEW MEDICATION ADDED: ICD-10-CM

## 2024-11-13 RX ORDER — PRIMIDONE 50 MG/1
TABLET ORAL
Qty: 30 TABLET | Refills: 1 | Status: SHIPPED | OUTPATIENT
Start: 2024-11-13

## 2024-11-13 RX ORDER — ALLOPURINOL 100 MG/1
TABLET ORAL
Qty: 60 TABLET | Refills: 1 | Status: SHIPPED | OUTPATIENT
Start: 2024-11-13

## 2024-11-14 ENCOUNTER — HOSPITAL ENCOUNTER (OUTPATIENT)
Dept: OTHER | Age: 69
Setting detail: THERAPIES SERIES
Discharge: HOME | End: 2024-11-14
Payer: MEDICARE

## 2024-11-14 VITALS
BODY MASS INDEX: 19.14 KG/M2 | DIASTOLIC BLOOD PRESSURE: 64 MMHG | SYSTOLIC BLOOD PRESSURE: 136 MMHG | WEIGHT: 115 LBS | OXYGEN SATURATION: 98 % | RESPIRATION RATE: 18 BRPM | HEART RATE: 62 BPM

## 2024-11-14 PROCEDURE — 99214 OFFICE O/P EST MOD 30 MIN: CPT

## 2024-11-14 RX ORDER — ISOSORBIDE DINITRATE 5 MG/1
5 TABLET ORAL 3 TIMES DAILY
Qty: 90 TABLET | Refills: 3 | Status: SHIPPED | OUTPATIENT
Start: 2024-11-14

## 2024-11-14 NOTE — PROGRESS NOTES
`    Congestive Heart Failure Clinic   [facility]       Referring Provider: Dr.Pranav Lema  Primary Care Physician: Tiffanie Tineo PA-C   Cardiologist:   Nephrologist: n/a      HISTORY OF PRESENT ILLNESS:     Milka Quinteros is a 69 y.o. (1955) female with a history of HFrEF (EF < 40%), most recent EF:  Lab Results   Component Value Date    LVEF 38 07/18/2022         She presents to the CHF clinic for ongoing evaluation and monitoring of heart failure.    In the CHF clinic today she denies any adverse symptoms except:  [] Dizziness or lightheadedness ( with position change )  [] Syncope or near syncope  [] Recent Fall  [] Shortness of breath at rest   [x] Dyspnea with exertion( rarely )  [] Decline in functional capacity (unable to perform activities they had previously been able to do)  [] Fatigue   [] Orthopnea  [] PND  [] Nocturnal cough  [] Hemoptysis  [] Chest pain, pressure, or discomfort  [] Palpitations  [] Abdominal distention  [] Abdominal bloating  [] Early satiety  [] Blood in stool   [] Diarrhea (occasionally )  [] Constipation (occasionally )  [] Nausea/Vomiting  [] Decreased urinary response to oral diuretic   [] Scrotal swelling   [] Lower extremity edema  [] Used PRN doses of oral diuretic   [] Weight gain    Wt Readings from Last 3 Encounters:   11/14/24 52.2 kg (115 lb)   10/17/24 51.7 kg (114 lb)   10/07/24 51.7 kg (114 lb)           SOCIAL HISTORY:  [] Denies tobacco, alcohol or illicit drug abuse  [x] Tobacco use: 1/2 pack a day  [] ETOH use:  [] Illicit drug use:        MEDICATIONS:    Allergies   Allergen Reactions    Pcn [Penicillins]      Prior to Visit Medications    Medication Sig Taking? Authorizing Provider   isosorbide dinitrate (ISORDIL) 5 MG tablet Take 1 tablet by mouth 3 times daily Yes Tomasa Coker, APRN - CNP   primidone (MYSOLINE) 50 MG tablet Take 0.5 tablets by mouth in the morning and at bedtime Yes Tiffanie Tineo PA-C

## 2024-11-14 NOTE — PLAN OF CARE
Problem: Chronic Conditions and Co-morbidities  Goal: Patient's chronic conditions and co-morbidity symptoms are monitored and maintained or improved  Flowsheets (Taken 11/14/2024 9629)  Care Plan - Patient's Chronic Conditions and Co-Morbidity Symptoms are Monitored and Maintained or Improved: Monitor and assess patient's chronic conditions and comorbid symptoms for stability, deterioration, or improvement

## 2024-12-17 ENCOUNTER — TELEPHONE (OUTPATIENT)
Dept: CARDIOLOGY | Age: 69
End: 2024-12-17

## 2024-12-31 DIAGNOSIS — J30.2 SEASONAL ALLERGIES: ICD-10-CM

## 2024-12-31 DIAGNOSIS — I50.9 CONGESTIVE HEART FAILURE, UNSPECIFIED HF CHRONICITY, UNSPECIFIED HEART FAILURE TYPE (HCC): ICD-10-CM

## 2024-12-31 DIAGNOSIS — Z79.899 MEDICATION DOSE INCREASED: ICD-10-CM

## 2025-01-02 RX ORDER — SACUBITRIL AND VALSARTAN 97; 103 MG/1; MG/1
TABLET, FILM COATED ORAL
Qty: 60 TABLET | Refills: 2 | Status: SHIPPED | OUTPATIENT
Start: 2025-01-02

## 2025-01-02 RX ORDER — FLUTICASONE PROPIONATE 50 MCG
2 SPRAY, SUSPENSION (ML) NASAL DAILY
Qty: 16 G | Refills: 2 | Status: SHIPPED | OUTPATIENT
Start: 2025-01-02

## 2025-01-02 NOTE — TELEPHONE ENCOUNTER
Name of Medication(s) Requested:  Requested Prescriptions     Pending Prescriptions Disp Refills    fluticasone (FLONASE) 50 MCG/ACT nasal spray [Pharmacy Med Name: FLUTICASONE 50 MCG NASAL SP 50 SAIDA] 16 g 2     Si sprays by Each Nostril route daily    ENTRESTO  MG per tablet [Pharmacy Med Name: ENTRESTO  MG TABS  Tablet] 60 tablet 2     Sig: TAKE ONE (1) TABLET BY MOUTH TWO TIMES DAILY.       Medication is on current medication list Yes    Dosage and directions were verified? Yes    Quantity verified: 30 day supply     Pharmacy Verified?  Yes      Last Appointment:  10/7/2024    Future appts:  No future appointments.     (If no appt send self scheduling link. .REFILLAPPT)  Scheduling request sent?     [] Yes  [x] No    Does patient need updated?  [] Yes  [x] No

## 2025-01-10 DIAGNOSIS — E78.00 PURE HYPERCHOLESTEROLEMIA: ICD-10-CM

## 2025-01-10 DIAGNOSIS — I42.9 CARDIOMYOPATHY, UNSPECIFIED TYPE (HCC): ICD-10-CM

## 2025-01-10 DIAGNOSIS — Z79.899 NEW MEDICATION ADDED: ICD-10-CM

## 2025-01-12 RX ORDER — METOPROLOL SUCCINATE 100 MG/1
100 TABLET, EXTENDED RELEASE ORAL 2 TIMES DAILY
Qty: 60 TABLET | Refills: 3 | Status: SHIPPED | OUTPATIENT
Start: 2025-01-12

## 2025-01-13 RX ORDER — ALLOPURINOL 100 MG/1
TABLET ORAL
Qty: 60 TABLET | Refills: 3 | Status: SHIPPED | OUTPATIENT
Start: 2025-01-13

## 2025-01-13 RX ORDER — ATORVASTATIN CALCIUM 40 MG/1
TABLET, FILM COATED ORAL
Qty: 30 TABLET | Refills: 3 | Status: SHIPPED | OUTPATIENT
Start: 2025-01-13

## 2025-01-13 RX ORDER — PRIMIDONE 50 MG/1
TABLET ORAL
Qty: 30 TABLET | Refills: 3 | Status: SHIPPED | OUTPATIENT
Start: 2025-01-13

## 2025-01-13 NOTE — TELEPHONE ENCOUNTER
Name of Medication(s) Requested:  Requested Prescriptions     Pending Prescriptions Disp Refills    primidone (MYSOLINE) 50 MG tablet [Pharmacy Med Name: PRIMIDONE 50 MG TABLET 50 Tablet] 30 tablet 3     Sig: Take 0.5 tablets by mouth in the morning and at bedtime    allopurinol (ZYLOPRIM) 100 MG tablet [Pharmacy Med Name: ALLOPURINOL 100 MG TABS 100 Tablet] 60 tablet 3     Sig: TAKE ONE (1) TABLET BY MOUTH TWO TIMES DAILY.    atorvastatin (LIPITOR) 40 MG tablet [Pharmacy Med Name: ATORVASTATIN 40 MG TABLET 40 Tablet] 30 tablet 3     Sig: TAKE ONE (1) TABLET BY MOUTH DAILY.       Medication is on current medication list Yes    Dosage and directions were verified? Yes    Quantity verified: 30 day supply     Pharmacy Verified?  Yes    Last Appointment:  10/7/2024    Future appts:  No future appointments.     (If no appt send self scheduling link. .REFILLAPPT)  Scheduling request sent?     [] Yes  [] No    Does patient need updated?  [] Yes  [] No

## 2025-01-28 ENCOUNTER — HOSPITAL ENCOUNTER (OUTPATIENT)
Dept: OTHER | Age: 70
Setting detail: THERAPIES SERIES
Discharge: HOME | End: 2025-01-28

## 2025-01-28 NOTE — PROGRESS NOTES
Patient a no show for today's appointment despite reminder call. Called patient, no answer. Left voicemail requesting return call to reschedule.

## 2025-02-17 DIAGNOSIS — I50.9 CONGESTIVE HEART FAILURE, UNSPECIFIED HF CHRONICITY, UNSPECIFIED HEART FAILURE TYPE (HCC): ICD-10-CM

## 2025-02-17 RX ORDER — SPIRONOLACTONE 25 MG/1
12.5 TABLET ORAL DAILY
Qty: 30 TABLET | Refills: 4 | Status: SHIPPED | OUTPATIENT
Start: 2025-02-17

## 2025-02-17 RX ORDER — ASPIRIN 81 MG/1
81 TABLET, COATED ORAL DAILY
Qty: 30 TABLET | Refills: 6 | Status: SHIPPED | OUTPATIENT
Start: 2025-02-17

## 2025-02-17 NOTE — TELEPHONE ENCOUNTER
Name of Medication(s) Requested:  Requested Prescriptions     Pending Prescriptions Disp Refills    spironolactone (ALDACTONE) 25 MG tablet [Pharmacy Med Name: SPIRONOLACTONE 25 MG TABLET 25 Tablet] 30 tablet 4     Sig: Take 0.5 tablets by mouth daily    ASPIRIN LOW DOSE 81 MG EC tablet [Pharmacy Med Name: ASPIRIN LOW DOSE 81 MG TBEC 81 Tablet] 30 tablet 6     Sig: take 1 tablet by mouth every day       Medication is on current medication list Yes    Dosage and directions were verified? Yes    Quantity verified: 30 day supply     Pharmacy Verified?  Yes    Last Appointment:  10/7/2024    Future appts:  No future appointments.     (If no appt send self scheduling link. .REFILLAPPT)  Scheduling request sent?     [] Yes  [] No    Does patient need updated?  [] Yes  [] No

## 2025-02-18 RX ORDER — BUMETANIDE 1 MG/1
TABLET ORAL
Qty: 30 TABLET | Refills: 0 | Status: SHIPPED | OUTPATIENT
Start: 2025-02-18

## 2025-03-06 ENCOUNTER — TELEPHONE (OUTPATIENT)
Dept: CARDIOLOGY CLINIC | Age: 70
End: 2025-03-06

## 2025-03-06 NOTE — TELEPHONE ENCOUNTER
Pt called in she would like to speak with the clinical staff regarding a note she would like the doctor to write for her. Milka can be reached at [phone]

## 2025-03-11 DIAGNOSIS — I50.9 CONGESTIVE HEART FAILURE, UNSPECIFIED HF CHRONICITY, UNSPECIFIED HEART FAILURE TYPE (HCC): ICD-10-CM

## 2025-03-11 DIAGNOSIS — Z79.899 MEDICATION DOSE INCREASED: ICD-10-CM

## 2025-03-11 RX ORDER — SACUBITRIL AND VALSARTAN 97; 103 MG/1; MG/1
TABLET, FILM COATED ORAL
Qty: 60 TABLET | Refills: 1 | Status: SHIPPED | OUTPATIENT
Start: 2025-03-11

## 2025-03-11 RX ORDER — BUMETANIDE 1 MG/1
TABLET ORAL
Qty: 90 TABLET | Refills: 3 | Status: SHIPPED | OUTPATIENT
Start: 2025-03-11

## 2025-03-11 RX ORDER — ISOSORBIDE DINITRATE 5 MG/1
5 TABLET ORAL 3 TIMES DAILY
Qty: 90 TABLET | Refills: 4 | Status: SHIPPED | OUTPATIENT
Start: 2025-03-11

## 2025-03-11 NOTE — TELEPHONE ENCOUNTER
Name of Medication(s) Requested:  Requested Prescriptions     Pending Prescriptions Disp Refills    ENTRESTO  MG per tablet [Pharmacy Med Name: ENTRESTO  MG TABS  Tablet] 60 tablet 1     Sig: TAKE ONE (1) TABLET BY MOUTH TWO TIMES DAILY.       Medication is on current medication list Yes    Dosage and directions were verified? Yes    Quantity verified: 30 day supply     Pharmacy Verified?  Yes    Last Appointment:  10/7/2024    Future appts:  Future Appointments   Date Time Provider Department Center   3/26/2025  9:00 AM Raf Hendrickson MD [facility] CARDIO [facility]   4/11/2025  8:30 AM Tiffanie Tineo PA-C TRAIL PC [facility] ECC DEP        (If no appt send self scheduling link. .REFILLAPPT)  Scheduling request sent?     [] Yes  [] No    Does patient need updated?  [] Yes  [] No

## 2025-03-25 RX ORDER — HYDRALAZINE HYDROCHLORIDE 50 MG/1
50 TABLET, FILM COATED ORAL 3 TIMES DAILY
Qty: 90 TABLET | Refills: 3 | Status: SHIPPED
Start: 2025-03-25 | End: 2025-03-26

## 2025-03-26 ENCOUNTER — OFFICE VISIT (OUTPATIENT)
Dept: CARDIOLOGY CLINIC | Age: 70
End: 2025-03-26
Payer: MEDICARE

## 2025-03-26 VITALS
WEIGHT: 109 LBS | RESPIRATION RATE: 20 BRPM | HEIGHT: 65 IN | BODY MASS INDEX: 18.16 KG/M2 | HEART RATE: 70 BPM | SYSTOLIC BLOOD PRESSURE: 124 MMHG | DIASTOLIC BLOOD PRESSURE: 60 MMHG

## 2025-03-26 DIAGNOSIS — I35.0 SEVERE AORTIC STENOSIS: Primary | ICD-10-CM

## 2025-03-26 DIAGNOSIS — N18.32 STAGE 3B CHRONIC KIDNEY DISEASE (HCC): ICD-10-CM

## 2025-03-26 DIAGNOSIS — E78.00 PURE HYPERCHOLESTEROLEMIA: ICD-10-CM

## 2025-03-26 DIAGNOSIS — I50.9 CONGESTIVE HEART FAILURE, UNSPECIFIED HF CHRONICITY, UNSPECIFIED HEART FAILURE TYPE (HCC): ICD-10-CM

## 2025-03-26 DIAGNOSIS — I42.9 CARDIOMYOPATHY, UNSPECIFIED TYPE (HCC): ICD-10-CM

## 2025-03-26 PROCEDURE — 3074F SYST BP LT 130 MM HG: CPT | Performed by: INTERNAL MEDICINE

## 2025-03-26 PROCEDURE — 1159F MED LIST DOCD IN RCRD: CPT | Performed by: INTERNAL MEDICINE

## 2025-03-26 PROCEDURE — 4004F PT TOBACCO SCREEN RCVD TLK: CPT | Performed by: INTERNAL MEDICINE

## 2025-03-26 PROCEDURE — 1090F PRES/ABSN URINE INCON ASSESS: CPT | Performed by: INTERNAL MEDICINE

## 2025-03-26 PROCEDURE — G8427 DOCREV CUR MEDS BY ELIG CLIN: HCPCS | Performed by: INTERNAL MEDICINE

## 2025-03-26 PROCEDURE — 99214 OFFICE O/P EST MOD 30 MIN: CPT | Performed by: INTERNAL MEDICINE

## 2025-03-26 PROCEDURE — G2211 COMPLEX E/M VISIT ADD ON: HCPCS | Performed by: INTERNAL MEDICINE

## 2025-03-26 PROCEDURE — 3017F COLORECTAL CA SCREEN DOC REV: CPT | Performed by: INTERNAL MEDICINE

## 2025-03-26 PROCEDURE — G8400 PT W/DXA NO RESULTS DOC: HCPCS | Performed by: INTERNAL MEDICINE

## 2025-03-26 PROCEDURE — 93000 ELECTROCARDIOGRAM COMPLETE: CPT | Performed by: INTERNAL MEDICINE

## 2025-03-26 PROCEDURE — 3078F DIAST BP <80 MM HG: CPT | Performed by: INTERNAL MEDICINE

## 2025-03-26 PROCEDURE — 1123F ACP DISCUSS/DSCN MKR DOCD: CPT | Performed by: INTERNAL MEDICINE

## 2025-03-26 PROCEDURE — G8419 CALC BMI OUT NRM PARAM NOF/U: HCPCS | Performed by: INTERNAL MEDICINE

## 2025-03-26 RX ORDER — HYDRALAZINE HYDROCHLORIDE 50 MG/1
50 TABLET, FILM COATED ORAL 2 TIMES DAILY
Qty: 90 TABLET | Refills: 3 | Status: SHIPPED | OUTPATIENT
Start: 2025-03-26

## 2025-03-26 RX ORDER — ISOSORBIDE DINITRATE 5 MG/1
5 TABLET ORAL 2 TIMES DAILY
Qty: 90 TABLET | Refills: 4 | Status: SHIPPED | OUTPATIENT
Start: 2025-03-26

## 2025-03-26 NOTE — PATIENT INSTRUCTIONS
Continue all your medications at current doses.   Can cut hydralazine and isordil to twice a day.   We will schedule an echo.   Restrict sodium intake to less than 2-2.5 g/day. Restrict fluid intake to less than 2.2 L/day. Goal BP is less than 130/80.   If your weight increases by > 2 lbs in a day or >5 lbs in more than a day, take an extra lasix pill.   Please try to exercise for 150 minutes a week.   I will see you back in the office in 6 months. Please call the office at ([phone]) if you have any questions.

## 2025-03-26 NOTE — PROGRESS NOTES
[facility] Cardiology  Office Visit         Reason for Visit: Heart Failure      History of Present Illness:     Ms. Quinteros is a 67 year old female with a PMHx of chronic HFrEF, severe AS, HTN, CKD, asthma and hx tobacco abuse.     She presented to ER 5/24/2022 for a 1 month history of worsening lower extremity edema bilaterally and intermittent SOB. She was admitted for acute heart failure, IV diuresed and during hospitalization underwent TTE that demonstrated worsening LV function EF 30%, stage III DD, dilated RV with reduced function and mild TR, moderate MR, severe AS, moderate-severe AI.  GDMT was adjusted.  She previously was following with valve clinic and scheduled for MICHAEL/LHC on 6/16/2022, she requested discharge from hospital for family event with plans to return for outpatient testing.  Subsequently, she underwent cardiac catheterization as well as MICHAEL on 7/18/2022.  These are summarized below.    Based on the results of the above tests, she was recommended to follow-up with cardiothoracic surgery for consideration of aortic valve replacement.  She states that her brother underwent the same procedure and unfortunately passed away about a year ago.  Given that, she is not inclined to pursue that option currently.  I have not seen her for a year now.  No visits to the hospital or emergency room.  Has been compliant with her medications.  Does follow with the congestive heart failure clinic.  She is now inclined to undergo TAVR evaluation    Has been compliant with her medications.  She does not report any functional limitations.  She does admit to being sedentary, however.    She reports dyspnea with exertion, shortness of breath, or decline in overall functional capacity. She denies orthopnea, PND, nocturnal cough or hemoptysis. She reports abdominal distention or bloating, early satiety, denies anorexia/change in appetite, unintentional weight loss. She does lower extremity edema. She denies exertional

## 2025-03-27 ENCOUNTER — TELEPHONE (OUTPATIENT)
Dept: CARDIOLOGY | Age: 70
End: 2025-03-27

## 2025-03-27 NOTE — TELEPHONE ENCOUNTER
Spoke w/patient to schedule echo ordered by RS (sooner than later).  I offered her an appt tomorrow, but patient declined, as she needs to set up a ride first.  I told her I would be in touch for a future date/time that suits her needs better.    Electronically signed by Rasheeda Smalls on 3/27/2025 at 11:12 AM

## 2025-03-31 ENCOUNTER — TELEPHONE (OUTPATIENT)
Dept: PRIMARY CARE CLINIC | Age: 70
End: 2025-03-31

## 2025-03-31 DIAGNOSIS — J45.21 MILD INTERMITTENT ASTHMA WITH ACUTE EXACERBATION: ICD-10-CM

## 2025-03-31 RX ORDER — BENZONATATE 100 MG/1
100 CAPSULE ORAL 2 TIMES DAILY PRN
Qty: 20 CAPSULE | Refills: 0 | Status: SHIPPED | OUTPATIENT
Start: 2025-03-31 | End: 2025-04-07

## 2025-03-31 RX ORDER — ALBUTEROL SULFATE 90 UG/1
2 INHALANT RESPIRATORY (INHALATION) 4 TIMES DAILY PRN
Qty: 54 G | Refills: 1 | Status: SHIPPED | OUTPATIENT
Start: 2025-03-31

## 2025-03-31 RX ORDER — PREDNISONE 10 MG/1
10 TABLET ORAL 2 TIMES DAILY
Qty: 10 TABLET | Refills: 0 | Status: SHIPPED | OUTPATIENT
Start: 2025-03-31 | End: 2025-04-05

## 2025-03-31 NOTE — TELEPHONE ENCOUNTER
----- Message from Chuck MARISCAL sent at 3/31/2025 12:26 PM EDT -----  Regarding: ECC Escalation To Practice  ECC Escalation To Practice      Type of Escalation: Red Flag Symptom  --------------------------------------------------------------------------------------------------------------------------    Information for Provider:  Patient is looking for appointment for: Symptom Difficulty Breathing  Reasons for Message: Unable to reach practice     Additional Information The Patient also have cold and she also need's a medication for her asthma   --------------------------------------------------------------------------------------------------------------------------    Relationship to Patient: Self  Call Back Info: OK to leave message on voicemail  Preferred Call Back Number: Phone [phone]

## 2025-03-31 NOTE — TELEPHONE ENCOUNTER
Called pt she states she is sick for about 4 days and having a hard time breathing due to her asthma, pt has loss of apatite vomiting and nausea, pt advised she needs evaluated to use er or walk in clinic,  pt states she has no transportation and asking if you will send medication in for her cold sx and also a medication for her asthma to   [facility]

## 2025-04-09 ENCOUNTER — HOSPITAL ENCOUNTER (OUTPATIENT)
Dept: CARDIOLOGY | Age: 70
Discharge: HOME | End: 2025-04-11
Payer: MEDICAID

## 2025-04-09 VITALS
BODY MASS INDEX: 18.16 KG/M2 | HEIGHT: 65 IN | WEIGHT: 109 LBS | SYSTOLIC BLOOD PRESSURE: 124 MMHG | DIASTOLIC BLOOD PRESSURE: 60 MMHG

## 2025-04-09 DIAGNOSIS — I35.0 SEVERE AORTIC STENOSIS: ICD-10-CM

## 2025-04-09 LAB
ECHO AR MAX VEL PISA: 4.4 M/S
ECHO AV AREA PEAK VELOCITY: 0.8 CM2
ECHO AV AREA VTI: 0.7 CM2
ECHO AV AREA/BSA PEAK VELOCITY: 0.5 CM2/M2
ECHO AV AREA/BSA VTI: 0.5 CM2/M2
ECHO AV CUSP MM: 0.6 CM
ECHO AV MEAN GRADIENT: 44 MMHG
ECHO AV MEAN VELOCITY: 3.2 M/S
ECHO AV PEAK GRADIENT: 79 MMHG
ECHO AV PEAK VELOCITY: 4.4 M/S
ECHO AV REGURGITANT PHT: 217.9 MS
ECHO AV VELOCITY RATIO: 0.2
ECHO AV VTI: 105.3 CM
ECHO BSA: 1.51 M2
ECHO EST RA PRESSURE: 3 MMHG
ECHO LA DIAMETER INDEX: 2.68 CM/M2
ECHO LA DIAMETER: 4.1 CM
ECHO LA VOL A-L A2C: 105 ML (ref 22–52)
ECHO LA VOL A-L A4C: 109 ML (ref 22–52)
ECHO LA VOL MOD A2C: 100 ML (ref 22–52)
ECHO LA VOL MOD A4C: 101 ML (ref 22–52)
ECHO LA VOLUME AREA LENGTH: 110 ML
ECHO LA VOLUME INDEX A-L A2C: 69 ML/M2 (ref 16–34)
ECHO LA VOLUME INDEX A-L A4C: 71 ML/M2 (ref 16–34)
ECHO LA VOLUME INDEX AREA LENGTH: 72 ML/M2 (ref 16–34)
ECHO LA VOLUME INDEX MOD A2C: 65 ML/M2 (ref 16–34)
ECHO LA VOLUME INDEX MOD A4C: 66 ML/M2 (ref 16–34)
ECHO LV EDV A2C: 190 ML
ECHO LV EDV A4C: 162 ML
ECHO LV EDV BP: 180 ML (ref 56–104)
ECHO LV EDV INDEX A4C: 106 ML/M2
ECHO LV EDV INDEX BP: 118 ML/M2
ECHO LV EDV NDEX A2C: 124 ML/M2
ECHO LV EF PHYSICIAN: 35 %
ECHO LV EJECTION FRACTION A2C: 29 %
ECHO LV EJECTION FRACTION A4C: 37 %
ECHO LV EJECTION FRACTION BIPLANE: 32 % (ref 55–100)
ECHO LV ESV A2C: 135 ML
ECHO LV ESV A4C: 103 ML
ECHO LV ESV BP: 123 ML (ref 19–49)
ECHO LV ESV INDEX A2C: 88 ML/M2
ECHO LV ESV INDEX A4C: 67 ML/M2
ECHO LV ESV INDEX BP: 80 ML/M2
ECHO LV FRACTIONAL SHORTENING: 15 % (ref 28–44)
ECHO LV INTERNAL DIMENSION DIASTOLE INDEX: 3.53 CM/M2
ECHO LV INTERNAL DIMENSION DIASTOLIC: 5.4 CM (ref 3.9–5.3)
ECHO LV INTERNAL DIMENSION SYSTOLIC INDEX: 3.01 CM/M2
ECHO LV INTERNAL DIMENSION SYSTOLIC: 4.6 CM
ECHO LV IVSD: 1.5 CM (ref 0.6–0.9)
ECHO LV MASS 2D: 362.7 G (ref 67–162)
ECHO LV MASS INDEX 2D: 237.1 G/M2 (ref 43–95)
ECHO LV POSTERIOR WALL DIASTOLIC: 1.5 CM (ref 0.6–0.9)
ECHO LV RELATIVE WALL THICKNESS RATIO: 0.56
ECHO LVOT AREA: 3.8 CM2
ECHO LVOT AV VTI INDEX: 0.19
ECHO LVOT DIAM: 2.2 CM
ECHO LVOT MEAN GRADIENT: 2 MMHG
ECHO LVOT PEAK GRADIENT: 3 MMHG
ECHO LVOT PEAK VELOCITY: 0.9 M/S
ECHO LVOT STROKE VOLUME INDEX: 49.4 ML/M2
ECHO LVOT SV: 75.6 ML
ECHO LVOT VTI: 19.9 CM
ECHO MV A VELOCITY: 0.7 M/S
ECHO MV AREA PHT: 3.7 CM2
ECHO MV AREA VTI: 3 CM2
ECHO MV E DECELERATION TIME (DT): 192.2 MS
ECHO MV E VELOCITY: 0.89 M/S
ECHO MV E/A RATIO: 1.27
ECHO MV EROA PISA: 0.3 CM2
ECHO MV LVOT VTI INDEX: 1.25
ECHO MV MAX VELOCITY: 1.1 M/S
ECHO MV MEAN GRADIENT: 1 MMHG
ECHO MV MEAN VELOCITY: 0.6 M/S
ECHO MV PEAK GRADIENT: 5 MMHG
ECHO MV PRESSURE HALF TIME (PHT): 59 MS
ECHO MV REGURGITANT ALIASING (NYQUIST) VELOCITY: 25 CM/S
ECHO MV REGURGITANT RADIUS PISA: 1.01 CM
ECHO MV REGURGITANT VELOCITY PISA: 6 M/S
ECHO MV REGURGITANT VOLUME PISA: 50.72 ML
ECHO MV REGURGITANT VTIA: 190 CM
ECHO MV VTI: 24.9 CM
ECHO PULMONARY ARTERY SYSTOLIC PRESSURE (PASP): 53 MMHG
ECHO PV MAX VELOCITY: 0.9 M/S
ECHO PV MEAN GRADIENT: 1 MMHG
ECHO PV MEAN VELOCITY: 0.5 M/S
ECHO PV PEAK GRADIENT: 3 MMHG
ECHO PV VTI: 12.8 CM
ECHO RIGHT VENTRICULAR SYSTOLIC PRESSURE (RVSP): 53 MMHG
ECHO RV INTERNAL DIMENSION: 2.5 CM
ECHO TV REGURGITANT MAX VELOCITY: 3.55 M/S
ECHO TV REGURGITANT PEAK GRADIENT: 51 MMHG

## 2025-04-09 PROCEDURE — 93306 TTE W/DOPPLER COMPLETE: CPT

## 2025-04-10 ENCOUNTER — RESULTS FOLLOW-UP (OUTPATIENT)
Dept: PRIMARY CARE CLINIC | Age: 70
End: 2025-04-10

## 2025-04-11 ENCOUNTER — OFFICE VISIT (OUTPATIENT)
Dept: PRIMARY CARE CLINIC | Age: 70
End: 2025-04-11
Payer: MEDICARE

## 2025-04-11 VITALS
BODY MASS INDEX: 18.99 KG/M2 | WEIGHT: 114 LBS | SYSTOLIC BLOOD PRESSURE: 138 MMHG | DIASTOLIC BLOOD PRESSURE: 75 MMHG | TEMPERATURE: 97.3 F | HEIGHT: 65 IN | HEART RATE: 79 BPM | RESPIRATION RATE: 16 BRPM

## 2025-04-11 DIAGNOSIS — I42.9 CARDIOMYOPATHY, UNSPECIFIED TYPE (HCC): ICD-10-CM

## 2025-04-11 DIAGNOSIS — Z87.891 PERSONAL HISTORY OF TOBACCO USE: ICD-10-CM

## 2025-04-11 DIAGNOSIS — J01.10 ACUTE NON-RECURRENT FRONTAL SINUSITIS: ICD-10-CM

## 2025-04-11 DIAGNOSIS — I35.0 SEVERE AORTIC STENOSIS: ICD-10-CM

## 2025-04-11 DIAGNOSIS — I27.20 PULMONARY HYPERTENSION (HCC): ICD-10-CM

## 2025-04-11 DIAGNOSIS — Z00.00 MEDICARE ANNUAL WELLNESS VISIT, SUBSEQUENT: Primary | ICD-10-CM

## 2025-04-11 DIAGNOSIS — J30.2 SEASONAL ALLERGIES: ICD-10-CM

## 2025-04-11 DIAGNOSIS — I34.0 MODERATE MITRAL REGURGITATION: ICD-10-CM

## 2025-04-11 PROCEDURE — G8420 CALC BMI NORM PARAMETERS: HCPCS | Performed by: PHYSICIAN ASSISTANT

## 2025-04-11 PROCEDURE — 4004F PT TOBACCO SCREEN RCVD TLK: CPT | Performed by: PHYSICIAN ASSISTANT

## 2025-04-11 PROCEDURE — 3078F DIAST BP <80 MM HG: CPT | Performed by: PHYSICIAN ASSISTANT

## 2025-04-11 PROCEDURE — G0439 PPPS, SUBSEQ VISIT: HCPCS | Performed by: PHYSICIAN ASSISTANT

## 2025-04-11 PROCEDURE — G8400 PT W/DXA NO RESULTS DOC: HCPCS | Performed by: PHYSICIAN ASSISTANT

## 2025-04-11 PROCEDURE — 3075F SYST BP GE 130 - 139MM HG: CPT | Performed by: PHYSICIAN ASSISTANT

## 2025-04-11 PROCEDURE — 1123F ACP DISCUSS/DSCN MKR DOCD: CPT | Performed by: PHYSICIAN ASSISTANT

## 2025-04-11 PROCEDURE — 3017F COLORECTAL CA SCREEN DOC REV: CPT | Performed by: PHYSICIAN ASSISTANT

## 2025-04-11 PROCEDURE — 99214 OFFICE O/P EST MOD 30 MIN: CPT | Performed by: PHYSICIAN ASSISTANT

## 2025-04-11 PROCEDURE — 1090F PRES/ABSN URINE INCON ASSESS: CPT | Performed by: PHYSICIAN ASSISTANT

## 2025-04-11 PROCEDURE — G0296 VISIT TO DETERM LDCT ELIG: HCPCS | Performed by: PHYSICIAN ASSISTANT

## 2025-04-11 PROCEDURE — G8427 DOCREV CUR MEDS BY ELIG CLIN: HCPCS | Performed by: PHYSICIAN ASSISTANT

## 2025-04-11 PROCEDURE — 1159F MED LIST DOCD IN RCRD: CPT | Performed by: PHYSICIAN ASSISTANT

## 2025-04-11 PROCEDURE — 1160F RVW MEDS BY RX/DR IN RCRD: CPT | Performed by: PHYSICIAN ASSISTANT

## 2025-04-11 RX ORDER — CETIRIZINE HYDROCHLORIDE 10 MG/1
10 TABLET ORAL DAILY
Qty: 30 TABLET | Refills: 5 | Status: SHIPPED | OUTPATIENT
Start: 2025-04-11

## 2025-04-11 RX ORDER — DOXYCYCLINE HYCLATE 100 MG
100 TABLET ORAL 2 TIMES DAILY
Qty: 14 TABLET | Refills: 0 | Status: SHIPPED | OUTPATIENT
Start: 2025-04-11 | End: 2025-04-18

## 2025-04-11 RX ORDER — FLUTICASONE PROPIONATE 50 MCG
2 SPRAY, SUSPENSION (ML) NASAL DAILY
Qty: 16 G | Refills: 5 | Status: SHIPPED | OUTPATIENT
Start: 2025-04-11

## 2025-04-11 SDOH — ECONOMIC STABILITY: FOOD INSECURITY: WITHIN THE PAST 12 MONTHS, THE FOOD YOU BOUGHT JUST DIDN'T LAST AND YOU DIDN'T HAVE MONEY TO GET MORE.: NEVER TRUE

## 2025-04-11 SDOH — ECONOMIC STABILITY: FOOD INSECURITY: WITHIN THE PAST 12 MONTHS, YOU WORRIED THAT YOUR FOOD WOULD RUN OUT BEFORE YOU GOT MONEY TO BUY MORE.: NEVER TRUE

## 2025-04-11 ASSESSMENT — PATIENT HEALTH QUESTIONNAIRE - PHQ9
2. FEELING DOWN, DEPRESSED OR HOPELESS: NOT AT ALL
1. LITTLE INTEREST OR PLEASURE IN DOING THINGS: NOT AT ALL
SUM OF ALL RESPONSES TO PHQ QUESTIONS 1-9: 0

## 2025-04-11 NOTE — PROGRESS NOTES
1 MG tablet TAKE ONE (1) TABLET DAILY. Yes Raf Hendrickson MD   spironolactone (ALDACTONE) 25 MG tablet Take 0.5 tablets by mouth daily Yes Tiffanie Tineo PA-C   ASPIRIN LOW DOSE 81 MG EC tablet take 1 tablet by mouth every day Yes Tiffanie Tineo PA-C   primidone (MYSOLINE) 50 MG tablet Take 0.5 tablets by mouth in the morning and at bedtime Yes Tiffanie Tineo PA-C   allopurinol (ZYLOPRIM) 100 MG tablet TAKE ONE (1) TABLET BY MOUTH TWO TIMES DAILY. Yes Tiffanie Tineo PA-C   atorvastatin (LIPITOR) 40 MG tablet TAKE ONE (1) TABLET BY MOUTH DAILY. Yes Tiffanie Tineo PA-C   metoprolol succinate (TOPROL XL) 100 MG extended release tablet Take 1 tablet by mouth in the morning and at bedtime Yes Raf Hendrickson MD   empagliflozin (JARDIANCE) 10 MG tablet TAKE 1 TABLET BY MOUTH EVERY DAY Yes Tiffanie Tineo PA-C   albuterol sulfate HFA (PROVENTIL;VENTOLIN;PROAIR) 108 (90 Base) MCG/ACT inhaler Inhale 2 puffs into the lungs every 4 hours as needed for Wheezing Yes Tiffanie Tineo PA-C   ondansetron (ZOFRAN) 4 MG tablet Take 1 tablet by mouth every 12 hours as needed for Nausea or Vomiting Yes Tiffanie Tineo PA-C   fluticasone (FLOVENT HFA) 220 MCG/ACT inhaler 2 inh bid Yes Tiffanie Tineo PA-C       CareTeam (Including outside providers/suppliers regularly involved in providing care):   Patient Care Team:  Tiffanie Tineo PA-C as PCP - General (Physician Assistant)  Tiffanie Tineo PA-C as PCP - Empaneled Provider     Recommendations for Preventive Services Due: see orders and patient instructions/AVS.  Recommended screening schedule for the next 5-10 years is provided to the patient in written form: see Patient Instructions/AVS.     Reviewed and updated this visit:  Tobacco  Allergies  Meds  Problems  Med Hx  Surg Hx  Fam Hx

## 2025-04-11 NOTE — PATIENT INSTRUCTIONS

## 2025-04-14 ENCOUNTER — TELEPHONE (OUTPATIENT)
Dept: CARDIOLOGY CLINIC | Age: 70
End: 2025-04-14

## 2025-04-14 NOTE — TELEPHONE ENCOUNTER
I called patient to schedule Valve Clinic appointment.   Plastic Surgeon Procedure Text (A): After obtaining clear surgical margins the patient was sent to plastics for surgical repair.  The patient understands they will receive post-surgical care and follow-up from the referring physician's office.

## 2025-04-15 ENCOUNTER — TELEPHONE (OUTPATIENT)
Dept: PRIMARY CARE CLINIC | Age: 70
End: 2025-04-15

## 2025-04-15 NOTE — TELEPHONE ENCOUNTER
Pt calling she can not tolerate the antibiotic prescribed at last visit for her sinus infx. The doxycyline caused her diarrhea, and nausea. She stopped taking it. Can you change to different antibiotic. She also wants doxy as not tolerated do not prescribe again.     advise

## 2025-04-16 RX ORDER — AZITHROMYCIN 250 MG/1
TABLET, FILM COATED ORAL
Qty: 6 TABLET | Refills: 0 | Status: SHIPPED | OUTPATIENT
Start: 2025-04-16 | End: 2025-04-26

## 2025-04-17 ENCOUNTER — TELEPHONE (OUTPATIENT)
Dept: PRIMARY CARE CLINIC | Age: 70
End: 2025-04-17

## 2025-04-17 NOTE — TELEPHONE ENCOUNTER
Patient phoned wanting to know if you could write a letter stating she would benefit from a \"motorized stair lift\" in her daughters apartment who she lives with due to her medical conditions.      Please advise.

## 2025-04-22 ENCOUNTER — APPOINTMENT (OUTPATIENT)
Dept: GENERAL RADIOLOGY | Age: 70
End: 2025-04-22
Payer: MEDICARE

## 2025-04-22 ENCOUNTER — HOSPITAL ENCOUNTER (EMERGENCY)
Age: 70
Discharge: HOME | End: 2025-04-22
Payer: MEDICARE

## 2025-04-22 VITALS
DIASTOLIC BLOOD PRESSURE: 65 MMHG | SYSTOLIC BLOOD PRESSURE: 144 MMHG | TEMPERATURE: 97.3 F | RESPIRATION RATE: 16 BRPM | OXYGEN SATURATION: 96 % | HEART RATE: 68 BPM

## 2025-04-22 DIAGNOSIS — R05.1 ACUTE COUGH: ICD-10-CM

## 2025-04-22 DIAGNOSIS — J18.9 PNEUMONIA OF RIGHT LOWER LOBE DUE TO INFECTIOUS ORGANISM: Primary | ICD-10-CM

## 2025-04-22 LAB
ALBUMIN SERPL-MCNC: 3.6 G/DL (ref 3.5–5.2)
ALP SERPL-CCNC: 99 U/L (ref 35–104)
ALT SERPL-CCNC: 118 U/L (ref 0–35)
ANION GAP SERPL CALCULATED.3IONS-SCNC: 10 MMOL/L (ref 7–16)
AST SERPL-CCNC: 54 U/L (ref 0–35)
B PARAP IS1001 DNA NPH QL NAA+NON-PROBE: NOT DETECTED
B PERT DNA SPEC QL NAA+PROBE: NOT DETECTED
BASOPHILS # BLD: 0.02 K/UL (ref 0–0.2)
BASOPHILS NFR BLD: 0 % (ref 0–2)
BILIRUB DIRECT SERPL-MCNC: 0.2 MG/DL (ref 0–0.2)
BILIRUB INDIRECT SERPL-MCNC: 0.2 MG/DL (ref 0–1)
BILIRUB SERPL-MCNC: 0.4 MG/DL (ref 0–1.2)
BILIRUB UR QL STRIP: NEGATIVE
BUN SERPL-MCNC: 29 MG/DL (ref 8–23)
C PNEUM DNA NPH QL NAA+NON-PROBE: NOT DETECTED
CALCIUM SERPL-MCNC: 9.4 MG/DL (ref 8.8–10.2)
CHLORIDE SERPL-SCNC: 106 MMOL/L (ref 98–107)
CLARITY UR: CLEAR
CO2 SERPL-SCNC: 22 MMOL/L (ref 22–29)
COLOR UR: YELLOW
CREAT SERPL-MCNC: 1.5 MG/DL (ref 0.5–1)
EKG ATRIAL RATE: 64 BPM
EKG P AXIS: 71 DEGREES
EKG P-R INTERVAL: 154 MS
EKG Q-T INTERVAL: 446 MS
EKG QRS DURATION: 96 MS
EKG QTC CALCULATION (BAZETT): 460 MS
EKG R AXIS: 52 DEGREES
EKG T AXIS: -135 DEGREES
EKG VENTRICULAR RATE: 64 BPM
EOSINOPHIL # BLD: 0.05 K/UL (ref 0.05–0.5)
EOSINOPHILS RELATIVE PERCENT: 1 % (ref 0–6)
ERYTHROCYTE [DISTWIDTH] IN BLOOD BY AUTOMATED COUNT: 15.7 % (ref 11.5–15)
FLUAV RNA NPH QL NAA+NON-PROBE: NOT DETECTED
FLUBV RNA NPH QL NAA+NON-PROBE: NOT DETECTED
GFR, ESTIMATED: 38 ML/MIN/1.73M2
GLUCOSE SERPL-MCNC: 80 MG/DL (ref 74–99)
GLUCOSE UR STRIP-MCNC: 250 MG/DL
HADV DNA NPH QL NAA+NON-PROBE: NOT DETECTED
HCOV 229E RNA NPH QL NAA+NON-PROBE: NOT DETECTED
HCOV HKU1 RNA NPH QL NAA+NON-PROBE: NOT DETECTED
HCOV NL63 RNA NPH QL NAA+NON-PROBE: NOT DETECTED
HCOV OC43 RNA NPH QL NAA+NON-PROBE: NOT DETECTED
HCT VFR BLD AUTO: 39.6 % (ref 34–48)
HGB BLD-MCNC: 12.8 G/DL (ref 11.5–15.5)
HGB UR QL STRIP.AUTO: NEGATIVE
HMPV RNA NPH QL NAA+NON-PROBE: NOT DETECTED
HPIV1 RNA NPH QL NAA+NON-PROBE: NOT DETECTED
HPIV2 RNA NPH QL NAA+NON-PROBE: NOT DETECTED
HPIV3 RNA NPH QL NAA+NON-PROBE: NOT DETECTED
HPIV4 RNA NPH QL NAA+NON-PROBE: NOT DETECTED
IMM GRANULOCYTES # BLD AUTO: <0.03 K/UL (ref 0–0.58)
IMM GRANULOCYTES NFR BLD: 0 % (ref 0–5)
KETONES UR STRIP-MCNC: NEGATIVE MG/DL
LEUKOCYTE ESTERASE UR QL STRIP: NEGATIVE
LIPASE SERPL-CCNC: 17 U/L (ref 13–60)
LYMPHOCYTES NFR BLD: 1.21 K/UL (ref 1.5–4)
LYMPHOCYTES RELATIVE PERCENT: 22 % (ref 20–42)
M PNEUMO DNA NPH QL NAA+NON-PROBE: NOT DETECTED
MCH RBC QN AUTO: 33.6 PG (ref 26–35)
MCHC RBC AUTO-ENTMCNC: 32.3 G/DL (ref 32–34.5)
MCV RBC AUTO: 103.9 FL (ref 80–99.9)
MONOCYTES NFR BLD: 0.29 K/UL (ref 0.1–0.95)
MONOCYTES NFR BLD: 5 % (ref 2–12)
NEUTROPHILS NFR BLD: 71 % (ref 43–80)
NEUTS SEG NFR BLD: 3.82 K/UL (ref 1.8–7.3)
NITRITE UR QL STRIP: NEGATIVE
PH UR STRIP: 6 [PH] (ref 5–8)
PLATELET # BLD AUTO: 192 K/UL (ref 130–450)
PMV BLD AUTO: 12 FL (ref 7–12)
POTASSIUM SERPL-SCNC: 4.8 MMOL/L (ref 3.5–5.1)
PROT SERPL-MCNC: 6.9 G/DL (ref 6.4–8.3)
PROT UR STRIP-MCNC: 30 MG/DL
RBC # BLD AUTO: 3.81 M/UL (ref 3.5–5.5)
RBC #/AREA URNS HPF: ABNORMAL /HPF
RSV RNA NPH QL NAA+NON-PROBE: NOT DETECTED
RV+EV RNA NPH QL NAA+NON-PROBE: NOT DETECTED
SARS-COV-2 RNA NPH QL NAA+NON-PROBE: NOT DETECTED
SODIUM SERPL-SCNC: 138 MMOL/L (ref 136–145)
SP GR UR STRIP: 1.01 (ref 1–1.03)
SPECIMEN DESCRIPTION: NORMAL
TROPONIN I SERPL HS-MCNC: 83 NG/L (ref 0–14)
TROPONIN I SERPL HS-MCNC: 86 NG/L (ref 0–14)
UROBILINOGEN UR STRIP-ACNC: 0.2 EU/DL (ref 0–1)
WBC #/AREA URNS HPF: ABNORMAL /HPF
WBC OTHER # BLD: 5.4 K/UL (ref 4.5–11.5)

## 2025-04-22 PROCEDURE — 93010 ELECTROCARDIOGRAM REPORT: CPT | Performed by: INTERNAL MEDICINE

## 2025-04-22 PROCEDURE — 99285 EMERGENCY DEPT VISIT HI MDM: CPT

## 2025-04-22 PROCEDURE — 71046 X-RAY EXAM CHEST 2 VIEWS: CPT

## 2025-04-22 PROCEDURE — 0202U NFCT DS 22 TRGT SARS-COV-2: CPT

## 2025-04-22 PROCEDURE — 83690 ASSAY OF LIPASE: CPT

## 2025-04-22 PROCEDURE — 6370000000 HC RX 637 (ALT 250 FOR IP): Performed by: NURSE PRACTITIONER

## 2025-04-22 PROCEDURE — 85025 COMPLETE CBC W/AUTO DIFF WBC: CPT

## 2025-04-22 PROCEDURE — 93005 ELECTROCARDIOGRAM TRACING: CPT | Performed by: NURSE PRACTITIONER

## 2025-04-22 PROCEDURE — 80053 COMPREHEN METABOLIC PANEL: CPT

## 2025-04-22 PROCEDURE — 84484 ASSAY OF TROPONIN QUANT: CPT

## 2025-04-22 PROCEDURE — 87086 URINE CULTURE/COLONY COUNT: CPT

## 2025-04-22 PROCEDURE — 81001 URINALYSIS AUTO W/SCOPE: CPT

## 2025-04-22 PROCEDURE — 82248 BILIRUBIN DIRECT: CPT

## 2025-04-22 RX ORDER — LEVOFLOXACIN 500 MG/1
750 TABLET, FILM COATED ORAL ONCE
Status: COMPLETED | OUTPATIENT
Start: 2025-04-22 | End: 2025-04-22

## 2025-04-22 RX ORDER — LEVOFLOXACIN 750 MG/1
750 TABLET, FILM COATED ORAL DAILY
Qty: 7 TABLET | Refills: 0 | Status: SHIPPED | OUTPATIENT
Start: 2025-04-22 | End: 2025-04-29

## 2025-04-22 RX ORDER — ONDANSETRON 4 MG/1
4 TABLET, ORALLY DISINTEGRATING ORAL 3 TIMES DAILY PRN
Qty: 21 TABLET | Refills: 0 | Status: SHIPPED | OUTPATIENT
Start: 2025-04-22

## 2025-04-22 RX ORDER — 0.9 % SODIUM CHLORIDE 0.9 %
500 INTRAVENOUS SOLUTION INTRAVENOUS ONCE
Status: DISCONTINUED | OUTPATIENT
Start: 2025-04-22 | End: 2025-04-22

## 2025-04-22 RX ORDER — DOXYCYCLINE 100 MG/1
100 CAPSULE ORAL ONCE
Status: DISCONTINUED | OUTPATIENT
Start: 2025-04-22 | End: 2025-04-22

## 2025-04-22 RX ORDER — DOXYCYCLINE HYCLATE 100 MG
100 TABLET ORAL 2 TIMES DAILY
Qty: 14 TABLET | Refills: 0 | Status: SHIPPED | OUTPATIENT
Start: 2025-04-22 | End: 2025-04-22 | Stop reason: SINTOL

## 2025-04-22 RX ADMIN — LEVOFLOXACIN 750 MG: 500 TABLET, FILM COATED ORAL at 19:33

## 2025-04-22 NOTE — ED NOTES
Attempted to assist fellow nurse with lab draw with ultrasound, patient would not let this nurse complete placement of IV with ultrasound and is refusing any further attempts

## 2025-04-22 NOTE — ED PROVIDER NOTES
Independent RAVIN Visit.      [facility]  Department of Emergency Medicine   ED  Encounter Note  Admit Date/RoomTime: 2025  7:14 PM  ED Room: PR1/MO1    NAME: Milka Quinteros  : 1955  MRN: [id number]     Chief Complaint:  Cold Symptoms (Patient states that over the past few days she's been dealing with a cough/chest congestion/ runny nose and diarrhea)    History of Present Illness          Milka Quinteros is a 70 y.o. old female with a history of hypertension, asthma, CHF, hyperlipidemia, chronic kidney disease who presents to the emergency department by private vehicle, with her daughter for evaluation of cough, chest congestion runny nose for the past few days and today she had 4 episodes of diarrhea and one episode of emesis prior to arrival.  She said she has been on  multiple antibiotics prescribed by her PCP for \"a bacterial infection\" but she does not know where or what kind.  She has not been on antibiotics since Monday.   She denies fevers, chills, shortness of breath, abdominal pain, chest pain.  Currently denies urinary symptoms but she said last week she had mild burning with urination.      ROS   Pertinent positives and negatives are stated within HPI, all other systems reviewed and are negative.    Past Medical History:  has a past medical history of Asthma, CHF (congestive heart failure) (HCC), CKD (chronic kidney disease), HLD (hyperlipidemia), Hypertension, and Valvular heart disease.    Surgical History:  has a past surgical history that includes Kidney surgery; Tubal ligation; and Tonsillectomy.    Social History:  reports that she has been smoking cigarettes. She started smoking about 25 years ago. She has a 25.8 pack-year smoking history. She has never used smokeless tobacco. She reports that she does not currently use alcohol. She reports that she does not use drugs.    Family History: family history includes Diabetes in her mother; Heart Surgery (age of onset:

## 2025-04-23 LAB
MICROORGANISM SPEC CULT: ABNORMAL
MICROORGANISM SPEC CULT: ABNORMAL
SERVICE CMNT-IMP: ABNORMAL
SPECIMEN DESCRIPTION: ABNORMAL

## 2025-05-01 DIAGNOSIS — I50.9 CONGESTIVE HEART FAILURE, UNSPECIFIED HF CHRONICITY, UNSPECIFIED HEART FAILURE TYPE (HCC): ICD-10-CM

## 2025-05-01 DIAGNOSIS — Z79.899 NEW MEDICATION ADDED: ICD-10-CM

## 2025-05-01 DIAGNOSIS — J45.21 MILD INTERMITTENT ASTHMA WITH ACUTE EXACERBATION: ICD-10-CM

## 2025-05-01 DIAGNOSIS — E78.00 PURE HYPERCHOLESTEROLEMIA: ICD-10-CM

## 2025-05-01 DIAGNOSIS — I42.9 CARDIOMYOPATHY, UNSPECIFIED TYPE (HCC): ICD-10-CM

## 2025-05-01 DIAGNOSIS — Z79.899 MEDICATION DOSE INCREASED: ICD-10-CM

## 2025-05-01 RX ORDER — METOPROLOL SUCCINATE 100 MG/1
100 TABLET, EXTENDED RELEASE ORAL 2 TIMES DAILY
Qty: 60 TABLET | Refills: 4 | Status: SHIPPED | OUTPATIENT
Start: 2025-05-01

## 2025-05-02 ENCOUNTER — OFFICE VISIT (OUTPATIENT)
Dept: PRIMARY CARE CLINIC | Age: 70
End: 2025-05-02
Payer: MEDICARE

## 2025-05-02 VITALS
HEIGHT: 65 IN | SYSTOLIC BLOOD PRESSURE: 130 MMHG | WEIGHT: 104 LBS | HEART RATE: 68 BPM | RESPIRATION RATE: 16 BRPM | TEMPERATURE: 97 F | BODY MASS INDEX: 17.33 KG/M2 | DIASTOLIC BLOOD PRESSURE: 80 MMHG

## 2025-05-02 DIAGNOSIS — N18.32 STAGE 3B CHRONIC KIDNEY DISEASE (HCC): ICD-10-CM

## 2025-05-02 DIAGNOSIS — Z72.0 TOBACCO ABUSE: ICD-10-CM

## 2025-05-02 DIAGNOSIS — K59.00 CONSTIPATION, UNSPECIFIED CONSTIPATION TYPE: ICD-10-CM

## 2025-05-02 DIAGNOSIS — J18.9 PNEUMONIA OF RIGHT LOWER LOBE DUE TO INFECTIOUS ORGANISM: Primary | ICD-10-CM

## 2025-05-02 DIAGNOSIS — L30.9 ECZEMA, UNSPECIFIED TYPE: ICD-10-CM

## 2025-05-02 DIAGNOSIS — J45.20 MILD INTERMITTENT ASTHMA WITHOUT COMPLICATION: ICD-10-CM

## 2025-05-02 PROCEDURE — 4004F PT TOBACCO SCREEN RCVD TLK: CPT | Performed by: PHYSICIAN ASSISTANT

## 2025-05-02 PROCEDURE — G8427 DOCREV CUR MEDS BY ELIG CLIN: HCPCS | Performed by: PHYSICIAN ASSISTANT

## 2025-05-02 PROCEDURE — G8419 CALC BMI OUT NRM PARAM NOF/U: HCPCS | Performed by: PHYSICIAN ASSISTANT

## 2025-05-02 PROCEDURE — 1159F MED LIST DOCD IN RCRD: CPT | Performed by: PHYSICIAN ASSISTANT

## 2025-05-02 PROCEDURE — 3079F DIAST BP 80-89 MM HG: CPT | Performed by: PHYSICIAN ASSISTANT

## 2025-05-02 PROCEDURE — 1090F PRES/ABSN URINE INCON ASSESS: CPT | Performed by: PHYSICIAN ASSISTANT

## 2025-05-02 PROCEDURE — G8400 PT W/DXA NO RESULTS DOC: HCPCS | Performed by: PHYSICIAN ASSISTANT

## 2025-05-02 PROCEDURE — 3075F SYST BP GE 130 - 139MM HG: CPT | Performed by: PHYSICIAN ASSISTANT

## 2025-05-02 PROCEDURE — 99214 OFFICE O/P EST MOD 30 MIN: CPT | Performed by: PHYSICIAN ASSISTANT

## 2025-05-02 PROCEDURE — 1123F ACP DISCUSS/DSCN MKR DOCD: CPT | Performed by: PHYSICIAN ASSISTANT

## 2025-05-02 PROCEDURE — 3017F COLORECTAL CA SCREEN DOC REV: CPT | Performed by: PHYSICIAN ASSISTANT

## 2025-05-02 PROCEDURE — 1160F RVW MEDS BY RX/DR IN RCRD: CPT | Performed by: PHYSICIAN ASSISTANT

## 2025-05-02 RX ORDER — ALLOPURINOL 100 MG/1
TABLET ORAL
Qty: 60 TABLET | Refills: 0 | Status: SHIPPED | OUTPATIENT
Start: 2025-05-02

## 2025-05-02 RX ORDER — SACUBITRIL AND VALSARTAN 97; 103 MG/1; MG/1
TABLET, FILM COATED ORAL
Qty: 60 TABLET | Refills: 0 | Status: SHIPPED | OUTPATIENT
Start: 2025-05-02

## 2025-05-02 RX ORDER — DOCUSATE SODIUM 100 MG/1
100 CAPSULE, LIQUID FILLED ORAL DAILY PRN
Qty: 30 CAPSULE | Refills: 0 | Status: SHIPPED | OUTPATIENT
Start: 2025-05-02

## 2025-05-02 RX ORDER — ALBUTEROL SULFATE 90 UG/1
2 INHALANT RESPIRATORY (INHALATION) 4 TIMES DAILY PRN
Qty: 8.5 G | Refills: 0 | Status: SHIPPED | OUTPATIENT
Start: 2025-05-02

## 2025-05-02 RX ORDER — PRIMIDONE 50 MG/1
TABLET ORAL
Qty: 30 TABLET | Refills: 0 | Status: SHIPPED | OUTPATIENT
Start: 2025-05-02

## 2025-05-02 RX ORDER — ATORVASTATIN CALCIUM 40 MG/1
40 TABLET, FILM COATED ORAL DAILY
Qty: 30 TABLET | Refills: 0 | Status: SHIPPED | OUTPATIENT
Start: 2025-05-02

## 2025-05-02 RX ORDER — TRIAMCINOLONE ACETONIDE 0.25 MG/G
OINTMENT TOPICAL
Qty: 80 G | Refills: 1 | Status: SHIPPED | OUTPATIENT
Start: 2025-05-02 | End: 2025-05-09

## 2025-05-02 NOTE — PROGRESS NOTES
Chief Complaint   Patient presents with    Cold Symptoms     Er follow up 4/22/25         HPI:  Milka presents to the office for ER follow up.  Patient was seen in the ER for acute cough on 4/22. Dx RLL pneumonia.  Since being discharged from the ER Milka's  symptoms have been \"much better.\"   Any prescribed medications have been finished. She completed the Levaquin.  She is using albuterol.  Discharge instructions and any medication changes were again reviewed.  Sees Cardiology on May 15. She is more willing to have the valve surgery now.     Patient's past medical, surgical, social and/or family history reviewed, updated in chart, and are non-contributory (unless otherwise stated).  Medications and allergies also reviewed and updated in chart.      Review of Systems:  Constitutional:  No fever, no fatigue, no chills, no headaches, no weight change  Dermatology: + rash, no mole, no dry or sensitive skin  ENT:  No cough, no sore throat, no sinus pain, +runny nose, no ear pain  Cardiology:  No chest pain, no palpitations, no leg edema, no shortness of breath, no PND  Gastroenterology:  No dysphagia, no abdominal pain, no nausea, no vomiting, + constipation, no diarrhea, no heartburn  Musculoskeletal:  No joint pain, no leg cramps, no back pain, no muscle aches  Respiratory:  No shortness of breath, no orthopnea, no wheezing, no ALMEIDA, no hemoptysis  Urology:  No blood in the urine, no urinary frequency, no urinary incontinence, no urinary urgency, no nocturia, no dysuria    Vitals:    05/02/25 0903   BP: 130/80   Pulse: 68   Resp: 16   Temp: 97 °F (36.1 °C)   Weight: 47.2 kg (104 lb)   Height: 1.651 m (5' 5\")       Physical Exam  Constitutional:       General: She is not in acute distress.     Appearance: Normal appearance. She is well-developed.   HENT:      Head: Normocephalic and atraumatic.      Right Ear: External ear normal.      Left Ear: External ear normal.      Nose: Nose normal.   Eyes:      General: No

## 2025-05-15 ENCOUNTER — OFFICE VISIT (OUTPATIENT)
Dept: CARDIOTHORACIC SURGERY | Age: 70
End: 2025-05-15
Payer: MEDICARE

## 2025-05-15 ENCOUNTER — OFFICE VISIT (OUTPATIENT)
Dept: CARDIOLOGY CLINIC | Age: 70
End: 2025-05-15
Payer: MEDICARE

## 2025-05-15 VITALS — SYSTOLIC BLOOD PRESSURE: 148 MMHG | DIASTOLIC BLOOD PRESSURE: 56 MMHG

## 2025-05-15 DIAGNOSIS — I35.0 NONRHEUMATIC AORTIC VALVE STENOSIS: Primary | ICD-10-CM

## 2025-05-15 DIAGNOSIS — I35.0 SEVERE AORTIC STENOSIS: Primary | ICD-10-CM

## 2025-05-15 PROCEDURE — G8400 PT W/DXA NO RESULTS DOC: HCPCS | Performed by: STUDENT IN AN ORGANIZED HEALTH CARE EDUCATION/TRAINING PROGRAM

## 2025-05-15 PROCEDURE — 1159F MED LIST DOCD IN RCRD: CPT | Performed by: INTERNAL MEDICINE

## 2025-05-15 PROCEDURE — 1090F PRES/ABSN URINE INCON ASSESS: CPT | Performed by: STUDENT IN AN ORGANIZED HEALTH CARE EDUCATION/TRAINING PROGRAM

## 2025-05-15 PROCEDURE — 1159F MED LIST DOCD IN RCRD: CPT | Performed by: STUDENT IN AN ORGANIZED HEALTH CARE EDUCATION/TRAINING PROGRAM

## 2025-05-15 PROCEDURE — 3078F DIAST BP <80 MM HG: CPT | Performed by: INTERNAL MEDICINE

## 2025-05-15 PROCEDURE — 1123F ACP DISCUSS/DSCN MKR DOCD: CPT | Performed by: INTERNAL MEDICINE

## 2025-05-15 PROCEDURE — G8419 CALC BMI OUT NRM PARAM NOF/U: HCPCS | Performed by: STUDENT IN AN ORGANIZED HEALTH CARE EDUCATION/TRAINING PROGRAM

## 2025-05-15 PROCEDURE — G8428 CUR MEDS NOT DOCUMENT: HCPCS | Performed by: STUDENT IN AN ORGANIZED HEALTH CARE EDUCATION/TRAINING PROGRAM

## 2025-05-15 PROCEDURE — 99214 OFFICE O/P EST MOD 30 MIN: CPT | Performed by: STUDENT IN AN ORGANIZED HEALTH CARE EDUCATION/TRAINING PROGRAM

## 2025-05-15 PROCEDURE — G8419 CALC BMI OUT NRM PARAM NOF/U: HCPCS | Performed by: INTERNAL MEDICINE

## 2025-05-15 PROCEDURE — 99205 OFFICE O/P NEW HI 60 MIN: CPT | Performed by: INTERNAL MEDICINE

## 2025-05-15 PROCEDURE — 1123F ACP DISCUSS/DSCN MKR DOCD: CPT | Performed by: STUDENT IN AN ORGANIZED HEALTH CARE EDUCATION/TRAINING PROGRAM

## 2025-05-15 PROCEDURE — G8400 PT W/DXA NO RESULTS DOC: HCPCS | Performed by: INTERNAL MEDICINE

## 2025-05-15 PROCEDURE — 4004F PT TOBACCO SCREEN RCVD TLK: CPT | Performed by: STUDENT IN AN ORGANIZED HEALTH CARE EDUCATION/TRAINING PROGRAM

## 2025-05-15 PROCEDURE — 3077F SYST BP >= 140 MM HG: CPT | Performed by: INTERNAL MEDICINE

## 2025-05-15 PROCEDURE — 4004F PT TOBACCO SCREEN RCVD TLK: CPT | Performed by: INTERNAL MEDICINE

## 2025-05-15 PROCEDURE — 1090F PRES/ABSN URINE INCON ASSESS: CPT | Performed by: INTERNAL MEDICINE

## 2025-05-15 PROCEDURE — G8427 DOCREV CUR MEDS BY ELIG CLIN: HCPCS | Performed by: INTERNAL MEDICINE

## 2025-05-15 PROCEDURE — G0537 PR RISK ASCVD TST ONCE PR 12 MO: HCPCS | Performed by: INTERNAL MEDICINE

## 2025-05-15 PROCEDURE — 3017F COLORECTAL CA SCREEN DOC REV: CPT | Performed by: INTERNAL MEDICINE

## 2025-05-15 PROCEDURE — 3017F COLORECTAL CA SCREEN DOC REV: CPT | Performed by: STUDENT IN AN ORGANIZED HEALTH CARE EDUCATION/TRAINING PROGRAM

## 2025-05-15 ASSESSMENT — ENCOUNTER SYMPTOMS
BACK PAIN: 0
ABDOMINAL PAIN: 0
BLOOD IN STOOL: 0
COUGH: 0
NAUSEA: 0
VOMITING: 0
CHEST TIGHTNESS: 0
SHORTNESS OF BREATH: 1

## 2025-05-15 NOTE — PROGRESS NOTES
\"HGBA1C\"   Lab Results   Component Value Date/Time    TSH 1.13 10/07/2024 03:05 PM      No components found for: \"LDLCALC\"     The 10-year ASCVD risk score (Daisy CRONIN, et al., 2019) is: 21.2%    Values used to calculate the score:      Age: 70 years      Sex: Female      Is Non- : Yes      Diabetic: No      Tobacco smoker: Yes      Systolic Blood Pressure: 148 mmHg      Is BP treated: Yes      HDL Cholesterol: 61 mg/dL      Total Cholesterol: 151 mg/dL   The 10-year ASCVD risk score (Daisy CRONIN, et al., 2019) is: 21.2%        FRAILTY ASSESSMENT:   New York Heart Association (NYHA) Classification: III   [place] Cardiomyopathy Questionnaire (KCCQ): yes                 ASSESSMENT & PLAN:     70 y.o. female  referred by Dr. Hendrickson for AS/AI. Past medical history of AS, HFrEF, HTN, HLD, CKD, CAD, tobacco abuse, asthma     Severe symptomatic aortic stenosis:  Patient with severe symptomatic aortic stenosis.  Echocardiogram showed mean gradient of 44 mmHg and aortic valve area of 0.7 cm².  She is symptomatic with symptoms of dyspnea on exertion.  I discussed with patient the option of SAVR versus TAVR.  She prefers to do TAVR but if SAVR is her best option then she thinks she is agreeable with that.  Will proceed with repeat left and right heart cath  Proceed with CTA SHASHA  We will discuss patient in valve team meeting  Risks and benefits of procedure explained to patient, including risk of MI, CVA, death, bleeding complications, vascular injury, renal failure requiring dialysis, and requiring emergency surgery.  Patient voiced understanding and agrees to proceed if needed.         Clarence Carias MD  Interventional cardiology / structural heart disease    [facility] Cardiology  [address] [id number]  [phone]

## 2025-05-15 NOTE — PROGRESS NOTES
[facility]  Visit Note      Patient name: Milka Quinteros    Reason for consult: AS    Referring Physician: Dr. Hendrickson    Primary Care Physician: Tiffanie Tineo PA-C    Date of service: 5/15/2025    Chief Complaint: AS    HPI: Milka Quinteros is a 70 y.o. female referred by Dr. Hendrickson for AS/AI. Past medical history of AS, HFrEF, HTN, HLD, CKD, CAD, tobacco abuse, asthma     She was originally seen in valve clinic in September 2021. MICHAEL and R/LHC were recommended. She had them completed in July 2022, and surgical intervention was felt best given her age and low STS risk score, however she was not agreeable despite multiple discussions of R/B/A.    She had follow up with Dr. Hendrickson in March 2025 and is now reportedly open to discussion of possible TAVR. She has reportedly been compliant with meds and had been following with CHF clinic although she did not show for her last scheduled visit in January 2025.     She is now open to have TAVR completed. She does not want SAVR unless there are no other options.    TTE 4/9/25:    Left Ventricle: Severely reduced left ventricular systolic function with a visually estimated EF of 35 - 40%. Left ventricle size is normal. Findings consistent with severe concentric hypertrophy. Severe global hypokinesis present. Grade II diastolic dysfunction with increased LAP.    Right Ventricle: Right ventricle size is normal. Low normal systolic function.    Aortic Valve: Moderate regurgitation. Severe stenosis of the aortic valve. AV mean gradient is 44 mmHg. AV area by continuity VTI is 0.7 cm2. AV Stroke Volume index is 49.4 mL/m2.    Mitral Valve: Moderate regurgitation.    Tricuspid Valve: Moderately elevated RVSP, consistent with moderate pulmonary hypertension. Est RA pressure is 3 mmHg. The estimated PASP is 53 mmHg.    Left Atrium: Left atrium is severely dilated.    Consider MICHAEL for further evaluation of the mitral valve.    Image quality is good.      MICHAEL 7/18/22:

## 2025-05-16 DIAGNOSIS — I10 PRIMARY HYPERTENSION: ICD-10-CM

## 2025-05-16 DIAGNOSIS — I35.0 NONRHEUMATIC AORTIC VALVE STENOSIS: Primary | ICD-10-CM

## 2025-06-05 DIAGNOSIS — I35.0 NONRHEUMATIC AORTIC VALVE STENOSIS: Primary | ICD-10-CM

## 2025-06-05 DIAGNOSIS — Z01.810 PREOP CARDIOVASCULAR EXAM: ICD-10-CM

## 2025-06-06 ENCOUNTER — TELEPHONE (OUTPATIENT)
Dept: CARDIOLOGY CLINIC | Age: 70
End: 2025-06-06

## 2025-06-06 NOTE — TELEPHONE ENCOUNTER
HEART CATH INSTRUCTIONS    I called & spoke to Lilly; Milka's daughter, & went over her mother's heart cath instructions  Place: [facility]  Date & Time: 6/12/25 at 9:00 am  Arrival Time:7:00 am  Preop Labs: Get your preop lab work a few days before the cath at a [facility]   NPO post midnight the night before cath  Take the following meds am of procedure: Inhalers if needed, Primidone, Allopurinol, Entresto, Metoprolol, Hydralazine, Isosorbide, & ASA 81 mg 4 tabs   Hold ALL of your other medications   Must have a ride home after the procedure  More detailed instructions and lab work orders emailed to Lilly at [email or web address], and will be scanned into Epic

## 2025-06-11 ENCOUNTER — TELEPHONE (OUTPATIENT)
Age: 70
End: 2025-06-11

## 2025-06-16 ENCOUNTER — TRANSCRIBE ORDERS (OUTPATIENT)
Dept: ADMINISTRATIVE | Age: 70
End: 2025-06-16

## 2025-06-16 DIAGNOSIS — N18.32 STAGE 3B CHRONIC KIDNEY DISEASE (HCC): Primary | ICD-10-CM

## 2025-06-19 DIAGNOSIS — E78.00 PURE HYPERCHOLESTEROLEMIA: ICD-10-CM

## 2025-06-19 DIAGNOSIS — Z79.899 NEW MEDICATION ADDED: ICD-10-CM

## 2025-06-19 DIAGNOSIS — J45.21 MILD INTERMITTENT ASTHMA WITH ACUTE EXACERBATION: ICD-10-CM

## 2025-06-19 DIAGNOSIS — I50.9 CONGESTIVE HEART FAILURE, UNSPECIFIED HF CHRONICITY, UNSPECIFIED HEART FAILURE TYPE (HCC): ICD-10-CM

## 2025-06-19 DIAGNOSIS — Z79.899 MEDICATION DOSE INCREASED: ICD-10-CM

## 2025-06-20 ENCOUNTER — HOSPITAL ENCOUNTER (OUTPATIENT)
Age: 70
Discharge: HOME | End: 2025-06-20
Payer: MEDICARE

## 2025-06-20 LAB
ANION GAP SERPL CALCULATED.3IONS-SCNC: 10 MMOL/L (ref 7–16)
BASOPHILS # BLD: 0.04 K/UL (ref 0–0.2)
BASOPHILS NFR BLD: 1 % (ref 0–2)
BUN SERPL-MCNC: 22 MG/DL (ref 8–23)
CALCIUM SERPL-MCNC: 9 MG/DL (ref 8.8–10.2)
CHLORIDE SERPL-SCNC: 105 MMOL/L (ref 98–107)
CO2 SERPL-SCNC: 23 MMOL/L (ref 22–29)
CREAT SERPL-MCNC: 1.3 MG/DL (ref 0.5–1)
EOSINOPHIL # BLD: 0.24 K/UL (ref 0.05–0.5)
EOSINOPHILS RELATIVE PERCENT: 4 % (ref 0–6)
ERYTHROCYTE [DISTWIDTH] IN BLOOD BY AUTOMATED COUNT: 13.7 % (ref 11.5–15)
GFR, ESTIMATED: 43 ML/MIN/1.73M2
GLUCOSE SERPL-MCNC: 87 MG/DL (ref 74–99)
HCT VFR BLD AUTO: 35.2 % (ref 34–48)
HGB BLD-MCNC: 11.7 G/DL (ref 11.5–15.5)
IMM GRANULOCYTES # BLD AUTO: <0.03 K/UL (ref 0–0.58)
IMM GRANULOCYTES NFR BLD: 0 % (ref 0–5)
LYMPHOCYTES NFR BLD: 2.06 K/UL (ref 1.5–4)
LYMPHOCYTES RELATIVE PERCENT: 31 % (ref 20–42)
MCH RBC QN AUTO: 34.2 PG (ref 26–35)
MCHC RBC AUTO-ENTMCNC: 33.2 G/DL (ref 32–34.5)
MCV RBC AUTO: 102.9 FL (ref 80–99.9)
MONOCYTES NFR BLD: 0.64 K/UL (ref 0.1–0.95)
MONOCYTES NFR BLD: 10 % (ref 2–12)
NEUTROPHILS NFR BLD: 55 % (ref 43–80)
NEUTS SEG NFR BLD: 3.71 K/UL (ref 1.8–7.3)
PHOSPHATE SERPL-MCNC: 3.2 MG/DL (ref 2.5–4.5)
PLATELET # BLD AUTO: 158 K/UL (ref 130–450)
PMV BLD AUTO: 11.2 FL (ref 7–12)
POTASSIUM SERPL-SCNC: 4.5 MMOL/L (ref 3.5–5.1)
RBC # BLD AUTO: 3.42 M/UL (ref 3.5–5.5)
SODIUM SERPL-SCNC: 138 MMOL/L (ref 136–145)
WBC OTHER # BLD: 6.7 K/UL (ref 4.5–11.5)

## 2025-06-20 PROCEDURE — 36415 COLL VENOUS BLD VENIPUNCTURE: CPT

## 2025-06-20 PROCEDURE — 80048 BASIC METABOLIC PNL TOTAL CA: CPT

## 2025-06-20 PROCEDURE — 85025 COMPLETE CBC W/AUTO DIFF WBC: CPT

## 2025-06-20 PROCEDURE — 84100 ASSAY OF PHOSPHORUS: CPT

## 2025-06-20 RX ORDER — SACUBITRIL AND VALSARTAN 97; 103 MG/1; MG/1
TABLET, FILM COATED ORAL
Qty: 60 TABLET | Refills: 5 | Status: SHIPPED | OUTPATIENT
Start: 2025-06-20

## 2025-06-20 RX ORDER — ATORVASTATIN CALCIUM 40 MG/1
40 TABLET, FILM COATED ORAL DAILY
Qty: 30 TABLET | Refills: 5 | Status: SHIPPED | OUTPATIENT
Start: 2025-06-20

## 2025-06-20 RX ORDER — PRIMIDONE 50 MG/1
TABLET ORAL
Qty: 30 TABLET | Refills: 5 | Status: SHIPPED | OUTPATIENT
Start: 2025-06-20

## 2025-06-20 RX ORDER — ALLOPURINOL 100 MG/1
TABLET ORAL
Qty: 60 TABLET | Refills: 5 | Status: SHIPPED | OUTPATIENT
Start: 2025-06-20

## 2025-06-20 RX ORDER — ALBUTEROL SULFATE 90 UG/1
2 INHALANT RESPIRATORY (INHALATION) 4 TIMES DAILY PRN
Qty: 8.5 G | Refills: 5 | Status: SHIPPED | OUTPATIENT
Start: 2025-06-20

## 2025-07-03 ENCOUNTER — TELEPHONE (OUTPATIENT)
Dept: PRIMARY CARE CLINIC | Age: 70
End: 2025-07-03

## 2025-07-03 NOTE — TELEPHONE ENCOUNTER
Patient calling stating she is having frequent urination and believes it is from her HCTZ that she takes in the morning and wants to know if she can take half of the dose or change something.    Please advise.      She would also like a note stating she would benefit from an air conditioner.

## 2025-07-08 ENCOUNTER — TELEPHONE (OUTPATIENT)
Dept: PRIMARY CARE CLINIC | Age: 70
End: 2025-07-08

## 2025-07-08 DIAGNOSIS — R21 RASH: Primary | ICD-10-CM

## 2025-07-08 DIAGNOSIS — L29.9 PRURITUS: ICD-10-CM

## 2025-07-08 NOTE — TELEPHONE ENCOUNTER
Pt calling she needs a dermatologist, she is itching so bad, she is causing open lesions from itching so much. She states the dermatologist on [facility] is no longer there. She is also having an issue with her insurance because no one seems to take it.    She declines going to [facility] or [facility]    Advise

## 2025-07-14 DIAGNOSIS — I50.9 CONGESTIVE HEART FAILURE, UNSPECIFIED HF CHRONICITY, UNSPECIFIED HEART FAILURE TYPE (HCC): ICD-10-CM

## 2025-07-14 RX ORDER — SPIRONOLACTONE 25 MG/1
12.5 TABLET ORAL DAILY
Qty: 30 TABLET | Refills: 0 | Status: SHIPPED | OUTPATIENT
Start: 2025-07-14

## 2025-07-14 NOTE — TELEPHONE ENCOUNTER
Name of Medication(s) Requested:  Requested Prescriptions     Pending Prescriptions Disp Refills    spironolactone (ALDACTONE) 25 MG tablet [Pharmacy Med Name: SPIRONOLACTONE 25 MG TABLET 25 Tablet] 30 tablet 0     Sig: Take 0.5 tablets by mouth daily       Medication is on current medication list Yes    Dosage and directions were verified? Yes    Quantity verified: 30 day supply     Pharmacy Verified?  Yes    Last Appointment:  5/2/2025    Future appts:  No future appointments.     (If no appt send self scheduling link. .[email or web address])  Scheduling request sent?     [] Yes  [] No    Does patient need updated?  [] Yes  [] No

## 2025-08-23 ENCOUNTER — APPOINTMENT (OUTPATIENT)
Dept: GENERAL RADIOLOGY | Age: 70
End: 2025-08-23
Payer: MEDICARE

## 2025-08-23 ENCOUNTER — HOSPITAL ENCOUNTER (EMERGENCY)
Age: 70
Discharge: HOME | End: 2025-08-23
Attending: EMERGENCY MEDICINE
Payer: MEDICARE

## 2025-08-23 VITALS
HEART RATE: 84 BPM | SYSTOLIC BLOOD PRESSURE: 172 MMHG | OXYGEN SATURATION: 97 % | TEMPERATURE: 97.7 F | DIASTOLIC BLOOD PRESSURE: 64 MMHG | RESPIRATION RATE: 18 BRPM

## 2025-08-23 DIAGNOSIS — J06.9 ACUTE UPPER RESPIRATORY INFECTION: ICD-10-CM

## 2025-08-23 DIAGNOSIS — M25.561 ACUTE PAIN OF RIGHT KNEE: Primary | ICD-10-CM

## 2025-08-23 LAB
ANION GAP SERPL CALCULATED.3IONS-SCNC: 14 MMOL/L (ref 7–16)
BASOPHILS # BLD: 0.03 K/UL (ref 0–0.2)
BASOPHILS NFR BLD: 0 % (ref 0–2)
BUN SERPL-MCNC: 25 MG/DL (ref 8–23)
CALCIUM SERPL-MCNC: 9.6 MG/DL (ref 8.8–10.2)
CHLORIDE SERPL-SCNC: 104 MMOL/L (ref 98–107)
CO2 SERPL-SCNC: 21 MMOL/L (ref 22–29)
CREAT SERPL-MCNC: 1.2 MG/DL (ref 0.5–1)
EOSINOPHIL # BLD: 0.07 K/UL (ref 0.05–0.5)
EOSINOPHILS RELATIVE PERCENT: 1 % (ref 0–6)
ERYTHROCYTE [DISTWIDTH] IN BLOOD BY AUTOMATED COUNT: 14.6 % (ref 11.5–15)
FLUAV RNA RESP QL NAA+PROBE: NOT DETECTED
FLUBV RNA RESP QL NAA+PROBE: NOT DETECTED
GFR, ESTIMATED: 50 ML/MIN/1.73M2
GLUCOSE SERPL-MCNC: 81 MG/DL (ref 74–99)
HCT VFR BLD AUTO: 36.4 % (ref 34–48)
HGB BLD-MCNC: 12 G/DL (ref 11.5–15.5)
IMM GRANULOCYTES # BLD AUTO: 0.03 K/UL (ref 0–0.58)
IMM GRANULOCYTES NFR BLD: 0 % (ref 0–5)
INR PPP: 1.1
LYMPHOCYTES NFR BLD: 1.87 K/UL (ref 1.5–4)
LYMPHOCYTES RELATIVE PERCENT: 20 % (ref 20–42)
MAGNESIUM SERPL-MCNC: 1.8 MG/DL (ref 1.6–2.4)
MCH RBC QN AUTO: 34.1 PG (ref 26–35)
MCHC RBC AUTO-ENTMCNC: 33 G/DL (ref 32–34.5)
MCV RBC AUTO: 103.4 FL (ref 80–99.9)
MONOCYTES NFR BLD: 0.81 K/UL (ref 0.1–0.95)
MONOCYTES NFR BLD: 9 % (ref 2–12)
NEUTROPHILS NFR BLD: 70 % (ref 43–80)
NEUTS SEG NFR BLD: 6.45 K/UL (ref 1.8–7.3)
PLATELET # BLD AUTO: 153 K/UL (ref 130–450)
PMV BLD AUTO: 12.3 FL (ref 7–12)
POTASSIUM SERPL-SCNC: 4 MMOL/L (ref 3.5–5.1)
PROTHROMBIN TIME: 12 SEC (ref 9.3–12.4)
RBC # BLD AUTO: 3.52 M/UL (ref 3.5–5.5)
SARS-COV-2 RNA RESP QL NAA+PROBE: NOT DETECTED
SODIUM SERPL-SCNC: 140 MMOL/L (ref 136–145)
SOURCE: NORMAL
SPECIMEN DESCRIPTION: NORMAL
WBC OTHER # BLD: 9.3 K/UL (ref 4.5–11.5)

## 2025-08-23 PROCEDURE — 80048 BASIC METABOLIC PNL TOTAL CA: CPT

## 2025-08-23 PROCEDURE — 87636 SARSCOV2 & INF A&B AMP PRB: CPT

## 2025-08-23 PROCEDURE — 85025 COMPLETE CBC W/AUTO DIFF WBC: CPT

## 2025-08-23 PROCEDURE — 73562 X-RAY EXAM OF KNEE 3: CPT

## 2025-08-23 PROCEDURE — 6370000000 HC RX 637 (ALT 250 FOR IP): Performed by: EMERGENCY MEDICINE

## 2025-08-23 PROCEDURE — 71046 X-RAY EXAM CHEST 2 VIEWS: CPT

## 2025-08-23 PROCEDURE — 85610 PROTHROMBIN TIME: CPT

## 2025-08-23 PROCEDURE — 99284 EMERGENCY DEPT VISIT MOD MDM: CPT

## 2025-08-23 PROCEDURE — 83735 ASSAY OF MAGNESIUM: CPT

## 2025-08-23 RX ORDER — ONDANSETRON 2 MG/ML
4 INJECTION INTRAMUSCULAR; INTRAVENOUS ONCE
Status: DISCONTINUED | OUTPATIENT
Start: 2025-08-23 | End: 2025-08-23 | Stop reason: HOSPADM

## 2025-08-23 RX ORDER — BENZONATATE 100 MG/1
100 CAPSULE ORAL 2 TIMES DAILY PRN
Qty: 20 CAPSULE | Refills: 0 | Status: SHIPPED | OUTPATIENT
Start: 2025-08-23 | End: 2025-08-30

## 2025-08-23 RX ORDER — HYDROCODONE BITARTRATE AND ACETAMINOPHEN 5; 325 MG/1; MG/1
2 TABLET ORAL ONCE
Status: COMPLETED | OUTPATIENT
Start: 2025-08-23 | End: 2025-08-23

## 2025-08-23 RX ORDER — ONDANSETRON 4 MG/1
4 TABLET, ORALLY DISINTEGRATING ORAL 3 TIMES DAILY PRN
Qty: 21 TABLET | Refills: 0 | Status: SHIPPED | OUTPATIENT
Start: 2025-08-23

## 2025-08-23 RX ORDER — NAPROXEN 500 MG/1
500 TABLET ORAL 2 TIMES DAILY PRN
Qty: 60 TABLET | Refills: 0 | Status: SHIPPED | OUTPATIENT
Start: 2025-08-23 | End: 2025-08-27

## 2025-08-23 RX ADMIN — HYDROCODONE BITARTRATE AND ACETAMINOPHEN 2 TABLET: 5; 325 TABLET ORAL at 20:36

## 2025-08-23 RX ADMIN — HYDROCODONE BITARTRATE AND ACETAMINOPHEN 1 TABLET: 5; 325 TABLET ORAL at 14:39

## 2025-08-23 ASSESSMENT — PAIN SCALES - GENERAL
PAINLEVEL_OUTOF10: 10
PAINLEVEL_OUTOF10: 2

## 2025-08-23 ASSESSMENT — PAIN - FUNCTIONAL ASSESSMENT
PAIN_FUNCTIONAL_ASSESSMENT: 0-10
PAIN_FUNCTIONAL_ASSESSMENT: 0-10

## 2025-08-23 ASSESSMENT — PAIN DESCRIPTION - DESCRIPTORS
DESCRIPTORS: THROBBING
DESCRIPTORS: STABBING;SHARP

## 2025-08-23 ASSESSMENT — PAIN DESCRIPTION - LOCATION: LOCATION: KNEE

## 2025-08-23 ASSESSMENT — PAIN DESCRIPTION - ORIENTATION: ORIENTATION: LEFT

## 2025-08-27 ENCOUNTER — OFFICE VISIT (OUTPATIENT)
Dept: PRIMARY CARE CLINIC | Age: 70
End: 2025-08-27
Payer: MEDICARE

## 2025-08-27 VITALS
SYSTOLIC BLOOD PRESSURE: 138 MMHG | HEIGHT: 65 IN | TEMPERATURE: 97.4 F | RESPIRATION RATE: 16 BRPM | BODY MASS INDEX: 18.83 KG/M2 | WEIGHT: 113 LBS | HEART RATE: 80 BPM | DIASTOLIC BLOOD PRESSURE: 66 MMHG

## 2025-08-27 DIAGNOSIS — M25.561 ACUTE PAIN OF RIGHT KNEE: Primary | ICD-10-CM

## 2025-08-27 DIAGNOSIS — J06.9 ACUTE URI: ICD-10-CM

## 2025-08-27 DIAGNOSIS — N18.32 STAGE 3B CHRONIC KIDNEY DISEASE (HCC): ICD-10-CM

## 2025-08-27 DIAGNOSIS — Z09 HOSPITAL DISCHARGE FOLLOW-UP: ICD-10-CM

## 2025-08-27 PROCEDURE — 3017F COLORECTAL CA SCREEN DOC REV: CPT | Performed by: PHYSICIAN ASSISTANT

## 2025-08-27 PROCEDURE — 3075F SYST BP GE 130 - 139MM HG: CPT | Performed by: PHYSICIAN ASSISTANT

## 2025-08-27 PROCEDURE — 99214 OFFICE O/P EST MOD 30 MIN: CPT | Performed by: PHYSICIAN ASSISTANT

## 2025-08-27 PROCEDURE — 1159F MED LIST DOCD IN RCRD: CPT | Performed by: PHYSICIAN ASSISTANT

## 2025-08-27 PROCEDURE — 1160F RVW MEDS BY RX/DR IN RCRD: CPT | Performed by: PHYSICIAN ASSISTANT

## 2025-08-27 PROCEDURE — G8420 CALC BMI NORM PARAMETERS: HCPCS | Performed by: PHYSICIAN ASSISTANT

## 2025-08-27 PROCEDURE — G8400 PT W/DXA NO RESULTS DOC: HCPCS | Performed by: PHYSICIAN ASSISTANT

## 2025-08-27 PROCEDURE — 3078F DIAST BP <80 MM HG: CPT | Performed by: PHYSICIAN ASSISTANT

## 2025-08-27 PROCEDURE — G8427 DOCREV CUR MEDS BY ELIG CLIN: HCPCS | Performed by: PHYSICIAN ASSISTANT

## 2025-08-27 PROCEDURE — 1090F PRES/ABSN URINE INCON ASSESS: CPT | Performed by: PHYSICIAN ASSISTANT

## 2025-08-27 PROCEDURE — 1123F ACP DISCUSS/DSCN MKR DOCD: CPT | Performed by: PHYSICIAN ASSISTANT

## 2025-08-27 PROCEDURE — 4004F PT TOBACCO SCREEN RCVD TLK: CPT | Performed by: PHYSICIAN ASSISTANT

## 2025-08-27 RX ORDER — METHYLPREDNISOLONE 4 MG/1
TABLET ORAL
Qty: 21 TABLET | Refills: 0 | Status: SHIPPED | OUTPATIENT
Start: 2025-08-27 | End: 2025-09-02

## 2025-09-02 ENCOUNTER — TELEPHONE (OUTPATIENT)
Dept: PRIMARY CARE CLINIC | Age: 70
End: 2025-09-02

## 2025-09-03 ENCOUNTER — HOSPITAL ENCOUNTER (OUTPATIENT)
Dept: ULTRASOUND IMAGING | Age: 70
Discharge: HOME | End: 2025-09-05
Payer: MEDICARE

## 2025-09-03 ENCOUNTER — HOSPITAL ENCOUNTER (OUTPATIENT)
Dept: LAB | Age: 70
Discharge: HOME | End: 2025-09-03
Payer: MEDICARE

## 2025-09-03 ENCOUNTER — OFFICE VISIT (OUTPATIENT)
Dept: PRIMARY CARE CLINIC | Age: 70
End: 2025-09-03

## 2025-09-03 ENCOUNTER — RESULTS FOLLOW-UP (OUTPATIENT)
Dept: PRIMARY CARE CLINIC | Age: 70
End: 2025-09-03

## 2025-09-03 VITALS
SYSTOLIC BLOOD PRESSURE: 158 MMHG | TEMPERATURE: 97 F | HEART RATE: 62 BPM | OXYGEN SATURATION: 97 % | BODY MASS INDEX: 18.83 KG/M2 | DIASTOLIC BLOOD PRESSURE: 60 MMHG | WEIGHT: 113 LBS | HEIGHT: 65 IN | RESPIRATION RATE: 18 BRPM

## 2025-09-03 DIAGNOSIS — R03.0 ELEVATED BLOOD PRESSURE READING: ICD-10-CM

## 2025-09-03 DIAGNOSIS — M79.89 PAIN AND SWELLING OF RIGHT LOWER LEG: ICD-10-CM

## 2025-09-03 DIAGNOSIS — M79.89 PAIN AND SWELLING OF RIGHT LOWER LEG: Primary | ICD-10-CM

## 2025-09-03 DIAGNOSIS — I42.9 CARDIOMYOPATHY, UNSPECIFIED TYPE (HCC): ICD-10-CM

## 2025-09-03 DIAGNOSIS — I35.0 SEVERE AORTIC STENOSIS: ICD-10-CM

## 2025-09-03 DIAGNOSIS — I10 PRIMARY HYPERTENSION: ICD-10-CM

## 2025-09-03 DIAGNOSIS — M79.661 PAIN AND SWELLING OF RIGHT LOWER LEG: ICD-10-CM

## 2025-09-03 DIAGNOSIS — M79.661 PAIN AND SWELLING OF RIGHT LOWER LEG: Primary | ICD-10-CM

## 2025-09-03 DIAGNOSIS — N89.8 VAGINAL DISCHARGE: ICD-10-CM

## 2025-09-03 LAB
BASOPHILS # BLD: 0.04 K/UL (ref 0–0.2)
BASOPHILS NFR BLD: 1 % (ref 0–2)
EOSINOPHIL # BLD: 0.15 K/UL (ref 0.05–0.5)
EOSINOPHILS RELATIVE PERCENT: 2 % (ref 0–6)
ERYTHROCYTE [DISTWIDTH] IN BLOOD BY AUTOMATED COUNT: 14.3 % (ref 11.5–15)
HCT VFR BLD AUTO: 34.3 % (ref 34–48)
HGB BLD-MCNC: 11.3 G/DL (ref 11.5–15.5)
IMM GRANULOCYTES # BLD AUTO: 0.03 K/UL (ref 0–0.58)
IMM GRANULOCYTES NFR BLD: 0 % (ref 0–5)
LYMPHOCYTES NFR BLD: 1.75 K/UL (ref 1.5–4)
LYMPHOCYTES RELATIVE PERCENT: 24 % (ref 20–42)
MCH RBC QN AUTO: 33.8 PG (ref 26–35)
MCHC RBC AUTO-ENTMCNC: 32.9 G/DL (ref 32–34.5)
MCV RBC AUTO: 102.7 FL (ref 80–99.9)
MONOCYTES NFR BLD: 1.09 K/UL (ref 0.1–0.95)
MONOCYTES NFR BLD: 15 % (ref 2–12)
NEUTROPHILS NFR BLD: 59 % (ref 43–80)
NEUTS SEG NFR BLD: 4.3 K/UL (ref 1.8–7.3)
PLATELET # BLD AUTO: 179 K/UL (ref 130–450)
PMV BLD AUTO: 11.9 FL (ref 7–12)
RBC # BLD AUTO: 3.34 M/UL (ref 3.5–5.5)
URATE SERPL-MCNC: 3.8 MG/DL (ref 2.4–5.7)
WBC OTHER # BLD: 7.4 K/UL (ref 4.5–11.5)

## 2025-09-03 PROCEDURE — 84550 ASSAY OF BLOOD/URIC ACID: CPT

## 2025-09-03 PROCEDURE — 93971 EXTREMITY STUDY: CPT

## 2025-09-03 PROCEDURE — 36415 COLL VENOUS BLD VENIPUNCTURE: CPT

## 2025-09-03 PROCEDURE — 85025 COMPLETE CBC W/AUTO DIFF WBC: CPT

## 2025-09-03 RX ORDER — METRONIDAZOLE 500 MG/1
500 TABLET ORAL 2 TIMES DAILY
Qty: 14 TABLET | Refills: 0 | Status: SHIPPED | OUTPATIENT
Start: 2025-09-03 | End: 2025-09-10

## 2025-09-03 RX ORDER — ISOSORBIDE DINITRATE 5 MG/1
5 TABLET ORAL 2 TIMES DAILY
Qty: 60 TABLET | Refills: 3 | Status: SHIPPED | OUTPATIENT
Start: 2025-09-03

## 2025-09-03 RX ORDER — DOXYCYCLINE HYCLATE 100 MG
100 TABLET ORAL 2 TIMES DAILY
Qty: 14 TABLET | Refills: 0 | Status: SHIPPED | OUTPATIENT
Start: 2025-09-03 | End: 2025-09-10

## 2025-09-03 RX ORDER — PREDNISONE 10 MG/1
10 TABLET ORAL 2 TIMES DAILY
Qty: 10 TABLET | Refills: 0 | Status: SHIPPED | OUTPATIENT
Start: 2025-09-03 | End: 2025-09-08

## 2025-09-04 RX ORDER — HYDRALAZINE HYDROCHLORIDE 50 MG/1
50 TABLET, FILM COATED ORAL 2 TIMES DAILY
Qty: 180 TABLET | Refills: 3 | Status: SHIPPED | OUTPATIENT
Start: 2025-09-04

## 2025-09-04 RX ORDER — METOPROLOL SUCCINATE 100 MG/1
100 TABLET, EXTENDED RELEASE ORAL 2 TIMES DAILY
Qty: 60 TABLET | Refills: 5 | Status: SHIPPED | OUTPATIENT
Start: 2025-09-04

## 2025-09-04 RX ORDER — ISOSORBIDE DINITRATE 5 MG/1
5 TABLET ORAL 2 TIMES DAILY
Qty: 180 TABLET | Refills: 4 | Status: SHIPPED | OUTPATIENT
Start: 2025-09-04